# Patient Record
Sex: MALE | Race: WHITE | NOT HISPANIC OR LATINO | Employment: OTHER | URBAN - METROPOLITAN AREA
[De-identification: names, ages, dates, MRNs, and addresses within clinical notes are randomized per-mention and may not be internally consistent; named-entity substitution may affect disease eponyms.]

---

## 2017-02-27 ENCOUNTER — ALLSCRIPTS OFFICE VISIT (OUTPATIENT)
Dept: OTHER | Facility: OTHER | Age: 62
End: 2017-02-27

## 2017-08-08 ENCOUNTER — GENERIC CONVERSION - ENCOUNTER (OUTPATIENT)
Dept: OTHER | Facility: OTHER | Age: 62
End: 2017-08-08

## 2017-10-27 ENCOUNTER — ALLSCRIPTS OFFICE VISIT (OUTPATIENT)
Dept: OTHER | Facility: OTHER | Age: 62
End: 2017-10-27

## 2018-01-08 RX ORDER — SODIUM CHLORIDE, SODIUM LACTATE, POTASSIUM CHLORIDE, CALCIUM CHLORIDE 600; 310; 30; 20 MG/100ML; MG/100ML; MG/100ML; MG/100ML
125 INJECTION, SOLUTION INTRAVENOUS CONTINUOUS
Status: CANCELLED | OUTPATIENT
Start: 2018-01-08

## 2018-01-08 NOTE — H&P
Assessment  1  Basal cell carcinoma of face (173 31) (C44 310)     Discussion/Summary  Discussion Summary:   Abner Lopez is a pleasant 58year old male who presents for consultation regarding a BCC of the right cheek  Please see HPI  I discussed with him excision of the lesion with complex closure versus flap versus full-thickness skin graft  He understood and agreed  We discussed with the patient the options, benefits, and risks of surgery such as anesthesia, bleeding, infection, scarring and the need for additional procedures  Consent was obtained and all questions answered to his satisfaction  We will plan for surgery at his earliest convenience  Chief Complaint  Chief Complaint Free Text Note Form: Right cheek basal cell cancer  History of Present Illness  HPI: Abner Lopez is a pleasant 58year old male who presents for consultation regarding a BCC of the right cheek  He was referred by Dr Willian Wilkerson  He states the lesion had been there for a year  It was scaled and he noticed it with wearing his glasses  He also had an atypical lesion on his abdomen which he states was removed by Dr Willian Wilkerson  Review of Systems  Complete-Male:   Constitutional: no fever,-- not feeling poorly,-- no chills-- and-- not feeling tired  Eyes: eyesight problems-- and-- Wears eye glasses  ENT: no hearing loss  Cardiovascular: no chest pain  Respiratory: no shortness of breath  Gastrointestinal: no abdominal pain,-- no nausea,-- no vomiting,-- no constipation,-- no diarrhea-- and-- no blood in stools  Integumentary: as noted in HPI  Psychiatric: no anxiety-- and-- no depression  Hematologic/Lymphatic: no tendency for easy bleeding-- and-- no tendency for easy bruising  ROS Reviewed:   ROS reviewed  Active Problems  1  HTN (hypertension) (401 9) (I10)   2  Hypercholesterolemia (272 0) (E78 00)   3  Numerous moles (216 9) (D22 9)   4  Rash (782 1) (R21)   5   Seborrheic dermatitis (690 10) (L21 9)     Past Medical History  1  History of Acute bacterial conjunctivitis of both eyes (372 03) (H10 33)   2  History of Contact blepharoconjunctivitis of both eyes (372 22) (H10 533)   3  History of Cough (786 2) (R05)   4  History of Erectile dysfunction of non-organic origin (302 72) (F52 21)   5  History of Hip pain, unspecified laterality   6  History of acute conjunctivitis (V12 49) (Z86 69)   7  History of allergic rhinitis (V12 69) (Z87 09)   8  History of allergy (V15 09) (Z88 9)   9  History of bronchitis (V12 69) (Z87 09)   10  History of conjunctivitis (V12 49) (Z86 69)   11  History of rosacea (V13 3) (Z87 2)   12  History of traveler's diarrhea (V12 79) (Z86 19)   13  History of Left eye pain (379 91) (H57 12)   14  History of Need for prophylactic vaccination and inoculation against influenza (V04 81)    (Z23)   15  History of Pain in joint of left hip (719 45) (M25 552)   16  History of Pain of finger, unspecified laterality (729 5) (M79 646)   17  History of Screening for colon cancer (V76 51) (Z12 11)   18  History of Screening PSA (prostate specific antigen) (V76 44) (Z12 5)   19  History of Trochanteric bursitis, unspecified laterality (726 5) (M70 60)  Active Problems And Past Medical History Reviewed: The active problems and past medical history were reviewed and updated today  Surgical History  1  Denied: History Of Prior Surgery  Surgical History Reviewed: The surgical history was reviewed and updated today  Family History  Mother    1  Family history of skin cancer (V16 8) (Z80 8)  Father    2  No pertinent family history  Family History Reviewed: The family history was reviewed and updated today  Social History   · Being A Social Drinker   · Former smoker (F43 27) (V57 402)   · Never Used Drugs  Social History Reviewed: The social history was reviewed and updated today  Current Meds   1  Atorvastatin Calcium 20 MG Oral Tablet; TAKE 1 TABLET DAILY;    Therapy: 08IJE1802 to (Tamera Hussein)  Requested for: 26Sfj4765; Last   Rx:96Vkh3648 Ordered   2  PriLOSEC 40 MG CPDR; Therapy: (GVDEYDWW:89GVF8041) to Recorded  Medication List Reviewed: The medication list was reviewed and updated today  Allergies  1  Neomycin-Polymyxin-Dexameth SUSP  2  No Known Food Allergies   3  Seasonal     Vitals  Vital Signs     Recorded: 74SZG2156 09:39AM   BP Comments unobtainable   Height 5 ft 11 3 in   Weight 216 lb 6 oz   BMI Calculated 29 93   BSA Calculated 2 19      Physical Exam  General Complete Exam (Brief) Male:   Constitutional   General appearance: No acute distress, well appearing and well nourished  Eyes   Conjunctiva and lids: No swelling, erythema, or discharge  Pupils and irises: Equal, round and reactive to light  Ears, Nose, Mouth, and Throat   External inspection of ears and nose: Normal     Pulmonary   Respiratory effort: No increased work of breathing or signs of respiratory distress  Auscultation of lungs: Clear to auscultation, equal breath sounds bilaterally, no wheezes, no rales, no rhonci  Cardiovascular   Palpation of heart: Normal PMI, no thrills  Auscultation of heart: Normal rate and rhythm, normal S1 and S2, without murmurs  Abdomen   Abdomen: Non-tender, no masses  Lymphatic   Palpation of lymph nodes in neck: No lymphadenopathy  Musculoskeletal   Gait and station: Normal     Skin   Skin and subcutaneous tissue: Abnormal  -- Lesion right cheek between the eye and nasal bridge  It measured approximately 6mm and has irregular border and is scaled in appearance  Photos were taken  Neurologic   Cranial nerves: Cranial nerves 2-12 intact      Psychiatric   Orientation to person, place and time: Normal     Mood and affect: Normal

## 2018-01-09 ENCOUNTER — HOSPITAL ENCOUNTER (OUTPATIENT)
Facility: AMBULARY SURGERY CENTER | Age: 63
Setting detail: OUTPATIENT SURGERY
Discharge: HOME | End: 2018-01-09
Attending: SURGERY | Admitting: SURGERY
Payer: COMMERCIAL

## 2018-01-09 VITALS
WEIGHT: 212 LBS | HEART RATE: 102 BPM | DIASTOLIC BLOOD PRESSURE: 68 MMHG | OXYGEN SATURATION: 96 % | SYSTOLIC BLOOD PRESSURE: 150 MMHG | HEIGHT: 72 IN | BODY MASS INDEX: 28.71 KG/M2 | RESPIRATION RATE: 16 BRPM | TEMPERATURE: 98 F

## 2018-01-09 DIAGNOSIS — C44.310 BASAL CELL CARCINOMA OF SKIN OF FACE: ICD-10-CM

## 2018-01-09 PROCEDURE — 88305 TISSUE EXAM BY PATHOLOGIST: CPT | Performed by: SURGERY

## 2018-01-09 RX ORDER — LIDOCAINE HYDROCHLORIDE AND EPINEPHRINE 10; 10 MG/ML; UG/ML
INJECTION, SOLUTION INFILTRATION; PERINEURAL AS NEEDED
Status: DISCONTINUED | OUTPATIENT
Start: 2018-01-09 | End: 2018-01-09 | Stop reason: HOSPADM

## 2018-01-09 RX ORDER — OMEPRAZOLE 40 MG/1
40 CAPSULE, DELAYED RELEASE ORAL DAILY
COMMUNITY

## 2018-01-09 RX ORDER — ATORVASTATIN CALCIUM 20 MG/1
TABLET, FILM COATED ORAL
COMMUNITY
Start: 2013-11-12 | End: 2018-03-30 | Stop reason: SDUPTHER

## 2018-01-09 NOTE — INTERIM OP NOTE
CHEEK BCC EXCISION, COMPLEX CLOSURE VERSUS FLAP VERSUS FULL THICKNESS SKIN GRAFT  Postoperative Note  PATIENT NAME: Chaitanya Ambrose  : 1955  MRN: [id number]  [facility] OR [facility] 05    Surgery Date: 2018    Preop Diagnosis:  Basal cell carcinoma of skin of face [C44 310]    Post-Op Diagnosis Codes:      * Basal cell carcinoma of skin of face [C44 310]    Procedure(s) (LRB):  CHEEK BCC EXCISION (Right)  COMPLEX CLOSURE VERSUS FLAP VERSUS FULL THICKNESS SKIN GRAFT (Right)    Surgeon(s) and Role:     * Dorian Najera MD - Primary     * Melvin Ferreira PA-C - Assisting    Specimens:  ID Type Source Tests Collected by Time Destination   1 : RIGHT CHEEK/LOWER EYELID BASAL CELL CARCINOMA Tissue Skin, Other TISSUE Kavya Najera MD 2018 1346        Estimated Blood Loss:   Minimal    Anesthesia Type:   Local     Findings:    None  Complications:   None    SIGNATURE: Melvin Ferreira PA-C   DATE: 2018   TIME: 1:55 PM

## 2018-01-09 NOTE — DISCHARGE INSTRUCTIONS
[facility]  Dr Mariza Landin   [address] [facility], [address]  Phone: [phone]     Postoperative Instructions for Outpatient Surgery     These instructions are being provided by your doctor to give you basic guidelines during your post-op recovery  Please let our office know if your contact information has changed       Please call the office today for an appointment in about 5-7 days for suture removal      Dressings: Steri strip, replace if it falls off       Activity Restrictions: Nothing strenuous for 24 hours       Bathing:  May shower tomorrow and pat incision dry       Medications:    Resume pre-op medications     You may take tylenol, aleve, or ibuprofen for pain control

## 2018-01-09 NOTE — OP NOTE
OPERATIVE REPORT  PATIENT NAME: Mely Campbell    :  1955  MRN: [id number]  Pt Location: [facility] OR [facility]    SURGERY DATE: 2018    Surgeon(s) and Role:     * Lorenza Hood MD - Primary     * Heidi Mace PA-C - Assisting    Preop Diagnosis:  Basal cell carcinoma of skin of face [C44 310]    Post-Op Diagnosis Codes: * Basal cell carcinoma of skin of face [C44 310]     Procedure:  1  Excision basal cell carcinoma right cheek/lower eyelid 1 1 cm excised diameter  2  Complex closure right cheek/lower eyelid 1 7 cm  Specimen(s):  ID Type Source Tests Collected by Time Destination   1 : RIGHT CHEEK/LOWER EYELID BASAL CELL CARCINOMA Tissue Skin, Other TISSUE EXAM Lorenza Hood MD 2018 1346        Estimated Blood Loss:   Minimal    Drains:       Anesthesia Type:   Local    Operative Indications:  Basal cell carcinoma of skin of face [C44 310]      Operative Findings:  As above    Complications:   None    Procedure and Technique:  Patient was seen in the holding area and the surgical site was marked with his participation  We reviewed the planned procedure as well as potential risks, complications limitations  He was taken to the operating room in the surgical site was prepped and draped in sterile fashion  A proper time-out was performed  2 5 loupe magnification was used aid visualization  The area was marked for excision as an ellipse, it was marked to include a narrow margin of normal-appearing skin  After infiltrating the area with xylocaine with epinephrine the skin incision was created with a 15 blade, carried down through the dermis, and the lesion was excised at the level of the orbicularis muscle utilizing curved iris scissors  It was marked with suture at in sent to pathology  In order to close the wound without tension, the edges were undermined with a 15 blade  After wide circumferential undermining hemostasis was assured the Bovie cautery    Closure was then accomplished utilizing 5 O Vicryl sutures buried at the level of the deep dermis this was followed by multiple interrupted 6 0 nylon skin sutures  Benzoin and Steri-Strips were applied the patient was discharged in stable condition     I was present for the entire procedure    Patient Disposition:  PACU     SIGNATURE: Nargis Rhodes MD  DATE: January 9, 2018  TIME: 2:05 PM

## 2018-01-12 NOTE — PROGRESS NOTES
Assessment    1  Conjunctivitis (372 30) (H10 9)    Plan  Conjunctivitis    · PredniSONE 20 MG Oral Tablet; TAKE 1 TABLET DAILY WITH FOOD   · Tobramycin-Dexamethasone 0 3-0 1 % Ophthalmic Suspension; INSTILL 1 DROP INTO BOTH  EYES 4 TIMES DAILY   · Follow Up if Not Better Evaluation and Treatment  Follow-up  Status: Complete  Done: 33MEB3593  09:22AM    Discussion/Summary  Discussion Summary:   See med list    Counseling Documentation With Imm: The patient was counseled regarding instructions for management, risk factor reductions  Chief Complaint  Chief Complaint Free Text Note Form: pt is here for a 3 day f/u for conjunctivitis  pt c/o itching and watering in OU  He stated OU are crusted shut in the morning when he wakes up  History of Present Illness  Conjunctivitis (Brief): The patient is being seen for worsening symptoms of conjunctivitis  Symptoms:  eye discharge, lid crusting, eye irritation, eye itching and eye redness, but no blurred vision  No associated symptoms are reported  Review of Systems  Complete-Male:   Constitutional: No fever or chills, feels well, no tiredness, no recent weight gain or weight loss  Eyes: as noted in HPI    ENT: no complaints of earache, no hearing loss, no nosebleeds, no nasal discharge, no sore throat, no hoarseness  Cardiovascular: No complaints of slow heart rate, no fast heart rate, no chest pain, no palpitations, no leg claudication, no lower extremity  Respiratory: No complaints of shortness of breath, no wheezing, no cough, no SOB on exertion, no orthopnea or PND  Gastrointestinal: No complaints of abdominal pain, no constipation, no nausea or vomiting, no diarrhea or bloody stools  Active Problems    1  Conjunctivitis (372 30) (H10 9)   2  Contact blepharoconjunctivitis of both eyes (372 22) (H10 533)   3  Hypercholesterolemia (272 0) (E78 0)   4  Seborrheic dermatitis (690 10) (L21 9)    Past Medical History    1   History of Acute bacterial conjunctivitis of both eyes (372 03) (H10 023)   2  History of Cough (786 2) (R05)   3  History of Erectile dysfunction of non-organic origin (302 72) (F52 21)   4  History of Hip pain, unspecified laterality   5  History of acute conjunctivitis (V12 49) (Z86 69)   6  History of allergic rhinitis (V12 69) (Z87 09)   7  History of allergy (V15 09) (Z88 9)   8  History of rosacea (V13 3) (Z87 2)   9  History of traveler's diarrhea (V12 79) (Z86 19)   10  History of Left eye pain (379 91) (H57 12)   11  History of Pain in joint of left hip (719 45) (M25 552)   12  History of Pain of finger, unspecified laterality (729 5) (M79 646)   13  History of Screening PSA (prostate specific antigen) (V76 44) (Z12 5)   14  History of Trochanteric bursitis, unspecified laterality (726 5) (M70 60)  Active Problems And Past Medical History Reviewed: The active problems and past medical history were reviewed and updated today  Surgical History    1  Denied: History Of Prior Surgery  Surgical History Reviewed: The surgical history was reviewed and updated today  Family History    1  Family history of skin cancer (V16 8) (Z80 8)    2  No pertinent family history  Family History Reviewed: The family history was reviewed and updated today  Social History    · Being A Social Drinker   · Current Some Day Smoker (305 1)   · Never Used Drugs  Social History Reviewed: The social history was reviewed and updated today  Current Meds   1  Atorvastatin Calcium 20 MG Oral Tablet; TAKE 1 TABLET DAILY; Therapy: 76ZSF9245 to ([phone])  Requested for: 78VNF9604; Last Rx:29Sep2015   Ordered   2  Ketoconazole 2 % External Shampoo; APPLY TO SCALP EVERY OTHER DAY FOR 5 MINUTES AND   RINSE; Therapy: 82WUF0581 to (Don Robbins)  Requested for: 24YZT8265; Last Rx:06Jan2016   Ordered   3   Neomycin-Polymyxin-Dexameth 0 1 % Ophthalmic Suspension; INSTILL 2 DROP 4 times daily BOTH   EYES;   Therapy: 88HCX4914 to (Last Rx:15Jan2016)  Requested for: 22JQK1383 Ordered  Medication List Reviewed: The medication list was reviewed and updated today  Allergies    1  No Known Drug Allergies    2  No Known Food Allergies   3  Seasonal    Vitals  Vital Signs [Data Includes: Current Encounter]    Recorded: [id number] 09:15AM Recorded: 61QJC1046 09:00AM   Temperature  98 3 F   Heart Rate  99   Systolic 775 219   Diastolic 90 880   Height  6 ft    Weight  214 lb 4 oz   BMI Calculated  29 06   BSA Calculated  2 19     Physical Exam    Constitutional   General appearance: No acute distress, well appearing and well nourished  Eyes   Conjunctiva and lids: Abnormal   Conjunctiva Findings: bilateral hyperemia, watery discharge bilaterally and purulent discharge bilaterally  Ears, Nose, Mouth, and Throat   External inspection of ears and nose: Normal     Oropharynx: Normal with no erythema, edema, exudate or lesions  Pulmonary   Auscultation of lungs: Clear to auscultation, equal breath sounds bilaterally, no wheezes, no rales, no rhonci  Cardiovascular   Auscultation of heart: Normal rate and rhythm, normal S1 and S2, without murmurs  Carotid pulses: Normal     Abdomen   Abdomen: Non-tender, no masses  Liver and spleen: No hepatomegaly or splenomegaly  Neurologic   Cranial nerves: Cranial nerves 2-12 intact  Future Appointments    Date/Time Provider Specialty Site   01/26/2016 04:45 PM Svetlana Sewell MD Internal Medicine [facility] PRIMARY CARE     Signatures   Electronically signed by :  Tien Decker MD; Jan 18 2016  9:23AM EST                       (Author)

## 2018-01-14 VITALS
OXYGEN SATURATION: 98 % | DIASTOLIC BLOOD PRESSURE: 90 MMHG | RESPIRATION RATE: 18 BRPM | TEMPERATURE: 98.5 F | HEART RATE: 96 BPM | WEIGHT: 211.8 LBS | BODY MASS INDEX: 29.65 KG/M2 | SYSTOLIC BLOOD PRESSURE: 128 MMHG | HEIGHT: 71 IN

## 2018-01-17 ENCOUNTER — ALLSCRIPTS OFFICE VISIT (OUTPATIENT)
Dept: OTHER | Facility: OTHER | Age: 63
End: 2018-01-17

## 2018-01-17 NOTE — CONSULTS
I had the pleasure of evaluating your patient, Andrew Rock  My full evaluation follows:      History of Present Illness  Triston Leblanc is a pleasant 58year old male who presents for consultation regarding a BCC of the right cheek  He was referred by Dr Alex Navarrete  He states the lesion had been there for a year  It was scaled and he noticed it with wearing his glasses  He also had an atypical lesion on his abdomen which he states was removed by Dr Aelx Navarrete  Discussion/Summary    Triston Leblanc is a pleasant 58year old male who presents for consultation regarding a BCC of the right cheek  Please see HPI  I discussed with him excision of the lesion with complex closure versus flap versus full-thickness skin graft  He understood and agreed  We discussed with the patient the options, benefits, and risks of surgery such as anesthesia, bleeding, infection, scarring and the need for additional procedures  Consent was obtained and all questions answered to his satisfaction  We will plan for surgery at his earliest convenience  Thank you very much for allowing me to participate in the care of this patient  If you have any questions, please do not hesitate to contact me        Signatures   Electronically signed by : Irena Ridley, [facility]; Oct 27 2017 10:20AM EST                       (Author)    Electronically signed by : ROB Melendez ; Oct 31 2017  9:21AM EST

## 2018-01-17 NOTE — PROGRESS NOTES
Assessment    1  Contact blepharoconjunctivitis of both eyes (372 22) (H10 663)    Plan  Conjunctivitis    · Tobramycin 0 3 % Ophthalmic Solution  Contact blepharoconjunctivitis of both eyes    · Neomycin-Polymyxin-Dexameth 0 1 % Ophthalmic Suspension; INSTILL 2 DROP 4  times daily BOTH EYES    Discussion/Summary    We will discontinue the tobramycin  Difficult to determine whether this is a reaction to the tobramycin or if it is just a viral blepharoconjunctivitis  The patient states he had a similar episode a year ago and was treated with TobraDex  We will prescribe neomycin polymyxin and dexamethasone ophthalmic suspension and monitor for improvement  The patient was cautioned that should his eyes not improve or if they should he worsen he should discontinue the medication and use only over-the-counter saline drops until he can be reevaluated or possibly seen by ophthalmology  Chief Complaint    1  Eye Discharge   2  Eye Itching   3  Eye Pain  Patient is here c/o burning, itch on both eyes, dry skin around the eyes for two weeks, symptoms are getting worse  History of Present Illness  HPI: Patient presents to the office with hyperemic conjunctiva  He was initially seen and treated for a suspected contact blepharoconjunctivitis with tobramycin ophthalmic suspension  He has been using it for a week and his eyes have actually gotten worse  He denies any morning crusting of his eyes there is no purulent drainage  Excess tearing is noted along with a sensation of burning  Review of Systems    Constitutional: no fever or chills, feels well, no tiredness, no recent weight loss or weight gain  ENT: no complaints of earache, no loss of hearing, no nosebleeds or nasal discharge, no sore throat or hoarseness  Cardiovascular: no complaints of slow or fast heart rate, no chest pain, no palpitations, no leg claudication or lower extremity edema     Respiratory: no complaints of shortness of breath, no wheezing or cough, no dyspnea on exertion, no orthopnea or PND  Active Problems    1  Conjunctivitis (372 30) (H10 9)   2  Contact blepharoconjunctivitis of both eyes (372 22) (H10 533)   3  Hypercholesterolemia (272 0) (E78 0)   4  Seborrheic dermatitis (690 10) (L21 9)    Past Medical History  Active Problems And Past Medical History Reviewed: The active problems and past medical history were reviewed and updated today  Social History    · Being A Social Drinker   · Current Some Day Smoker (305 1)   · Never Used Drugs  The social history was reviewed and is unchanged  Current Meds   1  Atorvastatin Calcium 20 MG Oral Tablet; TAKE 1 TABLET DAILY; Therapy: 71DQF4903 to (797 9386)  Requested for: 81YCM1465; Last   Rx:64Ioy3365 Ordered   2  Ketoconazole 2 % External Shampoo; APPLY TO SCALP EVERY OTHER DAY FOR 5   MINUTES AND RINSE; Therapy: 98LUI2999 to (Edwinna Rash)  Requested for: 11PKJ2216; Last   Rx:06Jan2016 Ordered   3  Tobramycin 0 3 % Ophthalmic Solution; INSTILL 1 DROP INTO AFFECTED EYE(S) 4   TIMES DAILY; Therapy: 28GRO0602 to (Evaluate:26Jan2016)  Requested for: 95JDS7913; Last   Rx:06Jan2016 Ordered    The medication list was reviewed and updated today  Allergies    1  No Known Drug Allergies    2  No Known Food Allergies   3  Seasonal    Vitals   Recorded: 84BDL6018 04:25PM   Temperature 98 5 F, Oral   Heart Rate 93   Systolic 610, LUE, Sitting   Diastolic 88, LUE, Sitting   Height 6 ft    Weight 211 lb 6 oz   BMI Calculated 28 67   BSA Calculated 2 18   O2 Saturation 98     Physical Exam    Constitutional   General appearance: No acute distress, well appearing and well nourished  Eyes   Conjunctiva and lids: Abnormal   Conjunctiva Findings: bilateral hyperemia and watery discharge bilaterally, but no purulent discharge and no subconjunctival hemorrhages   Eye Lids: bilateral upper eyelid blepharitis and bilateral lower eyelid blepharitis, but normal bilaterally, no ptosis on the right, no lid lag on the right, right lacrimal gland not enlarged, no purulent discharge from the right lacrimal gland, no ptosis on the left, no lid lag on the left, left lacrimal gland not enlarged and no purulent discharge from the left lacrimal gland          Future Appointments    Date/Time Provider Specialty Site   01/26/2016 04:45 PM Wes Powell MD Internal Medicine [facility] PRIMARY CARE     Signatures   Electronically signed by : Farzad Maya MD; Lowell 15 2016  4:56PM EST                       (Author)

## 2018-01-17 NOTE — PROGRESS NOTES
Assessment    1  HTN (hypertension) (401 9) (I10)   2  Hypercholesterolemia (272 0) (E78 0)   3  Need for prophylactic vaccination and inoculation against influenza (V04 81) (Z23)   4  Seborrheic dermatitis (690 10) (L21 9)   5  Numerous moles (216 9) (D22 9)    Plan  Hypercholesterolemia    · Atorvastatin Calcium 20 MG Oral Tablet (Lipitor); TAKE 1 TABLET DAILY  Need for prophylactic vaccination and inoculation against influenza    · Stop: Influenza  Numerous moles, Seborrheic dermatitis    · 2 - Maria L GERMAN, Tera Schmitz  (Dermatology) Physician Referral  Consult  Status: Hold For - Scheduling   Requested for: 24ZET4250  Care Summary provided  : Yes    Discussion/Summary  Counseling Documentation With Imm: The patient was counseled regarding prognosis, impressions  Chief Complaint  Chief Complaint Free Text Note Form: pt is here for cholesterol f/u  review labs  pt says the ketoconazole shampoo is not helping      History of Present Illness  Hyperlipidemia (Follow-Up): The patient states his hyperlipidemia has been poorly controlled since the last visit  Comorbid Illnesses: hypertension  He has no significant interval events  Symptoms: denies chest pain, denies intermittent leg claudication, denies muscle pain and denies muscle weakness  Associated symptoms include no focal neurologic deficits and no memory loss  Medications: The patient is not currently on any medications for his hyperlipidemia  The patient is not doing well with his hyperlipidemia goals  Review of Systems  Complete-Male:   Constitutional: No fever or chills, feels well, no tiredness, no recent weight gain or weight loss  Eyes: No complaints of eye pain, no red eyes, no discharge from eyes, no itchy eyes  ENT: nasal discharge and watering of nose, but no complaints of earache, no hearing loss, no nosebleeds, no nasal discharge, no sore throat, no hoarseness     Cardiovascular: No complaints of slow heart rate, no fast heart rate, no chest pain, no palpitations, no leg claudication, no lower extremity  Respiratory: No complaints of shortness of breath, no wheezing, no cough, no SOB on exertion, no orthopnea or PND  Gastrointestinal: No complaints of abdominal pain, no constipation, no nausea or vomiting, no diarrhea or bloody stools  Musculoskeletal: hip and finger pain  Active Problems    1  Conjunctivitis (372 30) (H10 9)   2  Contact blepharoconjunctivitis of both eyes (372 22) (H10 533)   3  Hypercholesterolemia (272 0) (E78 0)   4  Screening for colon cancer (V76 51) (Z12 11)   5  Seborrheic dermatitis (690 10) (L21 9)    Past Medical History    1  History of Acute bacterial conjunctivitis of both eyes (372 03) (H10 023)   2  History of Cough (786 2) (R05)   3  History of Erectile dysfunction of non-organic origin (302 72) (F52 21)   4  History of Hip pain, unspecified laterality   5  History of acute conjunctivitis (V12 49) (Z86 69)   6  History of allergic rhinitis (V12 69) (Z87 09)   7  History of allergy (V15 09) (Z88 9)   8  History of rosacea (V13 3) (Z87 2)   9  History of traveler's diarrhea (V12 79) (Z86 19)   10  History of Left eye pain (379 91) (H57 12)   11  History of Pain in joint of left hip (719 45) (M25 552)   12  History of Pain of finger, unspecified laterality (729 5) (M79 646)   13  History of Screening PSA (prostate specific antigen) (V76 44) (Z12 5)   14  History of Trochanteric bursitis, unspecified laterality (726 5) (M70 60)    Surgical History    1  Denied: History Of Prior Surgery    Family History    1  Family history of skin cancer (V16 8) (Z80 8)    2  No pertinent family history    Social History    · Being A Social Drinker   · Former smoker (C39 45) (M46 508)   · Never Used Drugs    Current Meds   1  Atorvastatin Calcium 20 MG Oral Tablet; TAKE 1 TABLET DAILY; Therapy: 96HBV0899 to ()  Requested for: 82KHO7314; Last Rx:00Gvt8033   Ordered   2   Ketoconazole 2 % External Shampoo; APPLY TO SCALP EVERY OTHER DAY FOR 5 MINUTES AND   RINSE; Therapy: 99ROG7694 to (Shae Ríos)  Requested for: 00VYX5791; Last Rx:06Jan2016   Ordered   3  Tobramycin-Dexamethasone 0 3-0 1 % Ophthalmic Suspension; INSTILL 1 DROP INTO BOTH EYES 4   TIMES DAILY; Therapy: 22JJX7927 to (Last Rx:18Jan2016)  Requested for: 70RVX1439 Ordered    Allergies    1  No Known Drug Allergies    2  No Known Food Allergies   3  Seasonal    Vitals  Vital Signs [Data Includes: Current Encounter]    Recorded: 85TPD2424 08:10AM   Temperature 98 F, Oral   Heart Rate 76   Systolic 640, LUE, Sitting   Diastolic 84, LUE, Sitting   BP Cuff Size Large   Height 6 ft    Weight 206 lb    BMI Calculated 27 94   BSA Calculated 2 16   O2 Saturation 99, RA     Physical Exam    Constitutional   General appearance: No acute distress, well appearing and well nourished  Eyes   Conjunctiva and lids: No swelling, erythema, or discharge  Ears, Nose, Mouth, and Throat   External inspection of ears and nose: Normal     Pulmonary   Auscultation of lungs: Clear to auscultation, equal breath sounds bilaterally, no wheezes, no rales, no rhonci  Cardiovascular   Auscultation of heart: Normal rate and rhythm, normal S1 and S2, without murmurs  Examination of extremities for edema and/or varicosities: Normal     Carotid pulses: Normal     Abdomen   Abdomen: Non-tender, no masses  Liver and spleen: No hepatomegaly or splenomegaly  Neurologic   Cranial nerves: Cranial nerves 2-12 intact  Reflexes: 2+ and symmetric  Sensation: No sensory loss           Signatures   Electronically signed by : Vikas Jackson MD; Feb 5 2016  8:52AM EST                       (Author)

## 2018-01-22 VITALS — HEIGHT: 71 IN | BODY MASS INDEX: 30.29 KG/M2 | WEIGHT: 216.38 LBS

## 2018-01-23 NOTE — PROGRESS NOTES
Active Problems    1  Basal cell carcinoma of face (173 31) (C44 310)   2  HTN (hypertension) (401 9) (I10)   3  Hypercholesterolemia (272 0) (E78 00)   4  Numerous moles (216 9) (D22 9)   5  Rash (782 1) (R21)   6  Seborrheic dermatitis (690 10) (L21 9)    Current Meds   1  Atorvastatin Calcium 20 MG Oral Tablet; TAKE 1 TABLET DAILY; Therapy: 83DOO2427 to (Evaluate:22Feb2018)  Requested for: 27Feb2017; Last   Rx:27Feb2017 Ordered   2  PriLOSEC 40 MG CPDR; Therapy: (Recorded:27Oct2017) to Recorded    Allergies    1  Neomycin-Polymyxin-Dexameth SUSP    2  No Known Food Allergies   3  Seasonal    Procedure  Procedure: suture removal  The wound was located on the right cheek and right lower eyelid  Wound Exam: well healed with no sign of infection  Procedure Note: sutures were removed  Dressing: an antibiotic ointment was applied  Patient Status:  the patient tolerated the procedure well  Complications:  there were no complications  Discussion/Summary      Patient presents for suture removal s/p BCC excision right low eyelid/cheek 1/9/18  Incision well healed  Post op scar care instructions reviewed and given with instructions to begin in 2 weeks  Pathology results reviewed with Dr Mae Carrillo and with patient  Patient verbalized understanding that margins were negative with two areas being close to edges of excision requiring no further tx  Patient will follow up in 4-6 weeks with Dr Mae Carrillo or PA  Final path report and op notes faxed to Dr Indio Singh at [phone]          Signatures   Electronically signed by : Guillermina Frank RN; Jan 17 2018 10:25AM EST                       (Author)    Electronically signed by : ROB Souza ; Jan 17 2018  5:18PM EST

## 2018-02-02 RX ORDER — AZITHROMYCIN 250 MG/1
250 TABLET, FILM COATED ORAL EVERY 24 HOURS
Qty: 6 TABLET | Refills: 0 | Status: CANCELLED | OUTPATIENT
Start: 2018-02-02 | End: 2018-02-07

## 2018-02-12 ENCOUNTER — TELEPHONE (OUTPATIENT)
Dept: FAMILY MEDICINE CLINIC | Facility: CLINIC | Age: 63
End: 2018-02-12

## 2018-02-16 DIAGNOSIS — F52.21 ED (ERECTILE DYSFUNCTION) OF NON-ORGANIC ORIGIN: Primary | ICD-10-CM

## 2018-02-16 RX ORDER — VARDENAFIL HYDROCHLORIDE 20 MG/1
20 TABLET ORAL DAILY PRN
Qty: 10 TABLET | Refills: 5 | Status: SHIPPED | OUTPATIENT
Start: 2018-02-16 | End: 2021-11-11

## 2018-03-09 ENCOUNTER — OFFICE VISIT (OUTPATIENT)
Dept: PLASTIC SURGERY | Facility: CLINIC | Age: 63
End: 2018-03-09
Payer: COMMERCIAL

## 2018-03-09 VITALS — HEIGHT: 72 IN | BODY MASS INDEX: 27.5 KG/M2 | WEIGHT: 203 LBS

## 2018-03-09 DIAGNOSIS — C44.319 BASAL CELL CARCINOMA OF CHEEK: Primary | ICD-10-CM

## 2018-03-09 PROCEDURE — 99212 OFFICE O/P EST SF 10 MIN: CPT | Performed by: PHYSICIAN ASSISTANT

## 2018-03-09 NOTE — LETTER
March 9, 2018     Osmanlisa Tejeda, [address] [facility]    Patient: Nereyda Vela   YOB: 1955   Date of Visit: 3/9/2018       Dear Dr Ezio Borrego:    Thank you for referring Nereyda Vela to me for evaluation  Below are my notes for this consultation  If you have questions, please do not hesitate to call me  I look forward to following your patient along with you           Sincerely,        Joel Arreguin PA-C        CC: Irma Márquez MD

## 2018-03-09 NOTE — PROGRESS NOTES
Assessment/Plan:   Emi Gunn is a pleasant 63-year-old male who is 2 months status post excision of a right cheek basal cell carcinoma with complex closure  Please see HPI  I have instructed him to become more aggressive with the silicone scar gel and massage to the scar  We will see him back in 3 months to re-evaluate the scar  Diagnoses and all orders for this visit:    Basal cell carcinoma of cheek          Subjective:     Patient ID: Gomez Raygoza is a 61 y o  male  HPI   Emi Gunn is a pleasant 63-year-old male who is 2 months status post excision of a right cheek basal cell carcinoma with complex closure  He is very happy with his results  He denies any complaints regarding the scar  Review of Systems   Skin:        As per HPI  Objective:     Physical Exam   Skin:   Right cheek/medial lower eyelid scar feels firm  Otherwise well healed  See photos

## 2018-03-26 RX ORDER — ATORVASTATIN CALCIUM 20 MG/1
TABLET, FILM COATED ORAL
Qty: 90 TABLET | Refills: 3 | OUTPATIENT
Start: 2018-03-26

## 2018-03-30 DIAGNOSIS — E78.2 MIXED HYPERLIPIDEMIA: Primary | ICD-10-CM

## 2018-03-30 RX ORDER — ATORVASTATIN CALCIUM 20 MG/1
20 TABLET, FILM COATED ORAL DAILY
Qty: 90 TABLET | Refills: 1 | Status: SHIPPED | OUTPATIENT
Start: 2018-03-30 | End: 2018-11-14 | Stop reason: SDUPTHER

## 2018-11-14 DIAGNOSIS — E78.2 MIXED HYPERLIPIDEMIA: ICD-10-CM

## 2018-11-14 RX ORDER — ATORVASTATIN CALCIUM 20 MG/1
20 TABLET, FILM COATED ORAL DAILY
Qty: 90 TABLET | Refills: 1 | Status: SHIPPED | OUTPATIENT
Start: 2018-11-14 | End: 2019-06-17 | Stop reason: SDUPTHER

## 2019-01-25 ENCOUNTER — OFFICE VISIT (OUTPATIENT)
Dept: URGENT CARE | Facility: CLINIC | Age: 64
End: 2019-01-25
Payer: COMMERCIAL

## 2019-01-25 VITALS
HEIGHT: 71 IN | DIASTOLIC BLOOD PRESSURE: 100 MMHG | WEIGHT: 209 LBS | BODY MASS INDEX: 29.26 KG/M2 | OXYGEN SATURATION: 100 % | RESPIRATION RATE: 16 BRPM | HEART RATE: 71 BPM | TEMPERATURE: 96.5 F | SYSTOLIC BLOOD PRESSURE: 140 MMHG

## 2019-01-25 DIAGNOSIS — S61.012A LACERATION OF LEFT THUMB WITHOUT FOREIGN BODY WITHOUT DAMAGE TO NAIL, INITIAL ENCOUNTER: Primary | ICD-10-CM

## 2019-01-25 PROBLEM — C44.310 BASAL CELL CARCINOMA OF FACE: Status: ACTIVE | Noted: 2017-10-27

## 2019-01-25 PROBLEM — R21 RASH: Status: ACTIVE | Noted: 2017-02-27

## 2019-01-25 PROCEDURE — 90471 IMMUNIZATION ADMIN: CPT

## 2019-01-25 PROCEDURE — 90715 TDAP VACCINE 7 YRS/> IM: CPT

## 2019-01-25 PROCEDURE — 12001 RPR S/N/AX/GEN/TRNK 2.5CM/<: CPT | Performed by: PHYSICIAN ASSISTANT

## 2019-01-25 PROCEDURE — 99213 OFFICE O/P EST LOW 20 MIN: CPT | Performed by: PHYSICIAN ASSISTANT

## 2019-01-25 NOTE — PATIENT INSTRUCTIONS
Keep your skin clean using soap and water  Pat dry  Avoid completely submerging finger in water for 2-3 days  Monitor for signs of infection including increasing redness, swelling, pus formation or drainage, streaking redness, fevers, chills, and body aches  Seek care immediately if these symptoms occur  Keep the finger bandaged when you will be working with your hands or in a dirty environment  You may remove the bandage and leave open to the air when you will be sitting in a clean environment and not using your hands  Follow up with your family doctor in 3-5 days  Proceed to the ER if symptoms worsen  Acute Wound Care   AMBULATORY CARE:   An acute wound  is an injury that causes a break in the skin  An acute wound can happen suddenly, last a short time, and may heal on its own  Common signs and symptoms of an acute wound:   · A cut, tear, or gash in your skin    · Bleeding, swelling, pain, or trouble moving the affected area    · Dirt or foreign objects inside the wound     · Milky, yellow, green, or brown pus in the wound     · Red, tender, or warm area around the pus    · Fever  Seek care immediately if:   · You have pus or a foul odor coming from the wound  · You have sudden trouble breathing or chest pain  · Blood soaks through your bandage  Contact your healthcare provider if:   · You have muscle, joint, or body aches, sweating, or a fever  · You have more swelling, redness, or bleeding in your wound  · Your skin is itchy, swollen, or you have a rash  · You have questions or concerns about your condition or care  Treatment for an acute wound  may include any of the following:  · Cleansing  is done with soap and water to wash away germs and decrease the risk of infection  Sterile water further cleans the wound  The cleaning is done under high pressure with a catheter tip and large syringe  A solution that kills germs may also be used      · Debridement  is done to clean and remove objects, dirt, or dead tissues from the open wound  Healthcare providers may also drain the wound to clean out pus  · Closure of the wound  is done with stitches, staples, skin adhesive, or other treatments  This may be done if the wound is wide or deep  Stitches may be needed if the wound is in an area that moves a lot, such as the hands, feet, and joints  Stitches may help to keep the wound from getting infected  They may also decrease the amount of scarring you have  Some wounds may heal better without stitches  Wound care:   · If your wound was closed with thin strips of medical tape, keep them clean and dry  The strips of medical tape will fall off on their own  Do not pull them off  · Keep the bandage clean and dry  Do not remove the bandage over your wound unless your healthcare provider says it is okay  · Wash your hands before and after you take care of your wound to prevent infection  · Clean the wound as directed  If you cannot reach the wound, have someone help you  · If you have packing, make sure all the gauze used to pack the wound is taken out and replaced as directed  Keep track of how many gauze dressings are placed inside the wound  Follow up with your healthcare provider as directed:  Write down your questions so you remember to ask them during your visits  © 2016 [employer] is for End User's use only and may not be sold, redistributed or otherwise used for commercial purposes  All illustrations and images included in CareNotes® are the copyrighted property of A D A M , Inc  or Brayden Aviles  The above information is an  only  It is not intended as medical advice for individual conditions or treatments  Talk to your doctor, nurse or pharmacist before following any medical regimen to see if it is safe and effective for you

## 2019-01-25 NOTE — PROGRESS NOTES
[facility]        NAME: Anna Garcia is a 59 y o  male  : 1955    MRN: [id number]  DATE: 2019  TIME: 9:55 AM    Assessment and Plan   Laceration of left thumb without foreign body without damage to nail, initial encounter [S61 012A]  1  Laceration of left thumb without foreign body without damage to nail, initial encounter  TDAP Vaccine greater than or equal to 8yo    Tdap administered in office  Patient Instructions   Keep your skin clean using soap and water  Pat dry  Avoid completely submerging finger in water for 2-3 days  Monitor for signs of infection including increasing redness, swelling, pus formation or drainage, streaking redness, fevers, chills, and body aches  Seek care immediately if these symptoms occur  Keep the finger bandaged when you will be working with your hands or in a dirty environment  You may remove the bandage and leave open to the air when you will be sitting in a clean environment and not using your hands  Follow up with your family doctor in 3-5 days  Proceed to the ER if symptoms worsen  Treatment plan reviewed in length  All questions answered  Precautions given  Patient verbalized understanding and agreement  Chief Complaint     Chief Complaint   Patient presents with    Laceration     recieved injury from metal pretty last evening small laceration on interior portion of      History of Present Illness   28-year-old male presenting with complaint of laceration of the left thumb x 1 day  He notes that at roughly 7:00 p m  yesterday he cut his finger trying to assemble something noting he jammed a metal pretty into his thumb  He notes pain only in the location of the laceration denying any joint or hand pain  He did clean the digit using an antiseptic spray  No other treatments tried  He has kept the digit bandaged until this morning  He notes tingling in the area of the laceration but otherwise denies any numbness or weakness    No changes in skin color  Bleeding was easily stopped  FROM of the digit  Review of Systems   Review of Systems   Constitutional: Negative for chills, diaphoresis, fatigue and fever  Respiratory: Negative for cough, shortness of breath and wheezing  Cardiovascular: Negative for chest pain and palpitations  Gastrointestinal: Negative for abdominal pain, diarrhea, nausea and vomiting  Musculoskeletal: Negative for myalgias  Skin: Negative for rash  Neurological: Negative for weakness and numbness  Current Medications       Current Outpatient Prescriptions:     atorvastatin (LIPITOR) 20 mg tablet, Take 1 tablet (20 mg total) by mouth daily, Disp: 90 tablet, Rfl: 1    omeprazole (PRILOSEC) 40 MG capsule, Take by mouth, Disp: , Rfl:     vardenafil (LEVITRA) 20 MG tablet, Take 1 tablet (20 mg total) by mouth daily as needed for erectile dysfunction for up to 10 days, Disp: 10 tablet, Rfl: 5    Current Allergies     Allergies as of 01/25/2019 - Reviewed 01/25/2019   Allergen Reaction Noted    Neomycin-polymyxin-dexameth  07/08/2016          The following portions of the patient's history were reviewed and updated as appropriate: allergies, current medications, past family history, past medical history, past social history, past surgical history and problem list      Past Medical History:   Diagnosis Date    GERD (gastroesophageal reflux disease)     Hyperlipidemia        Past Surgical History:   Procedure Laterality Date    FACIAL/NECK BIOPSY Right 1/9/2018    Procedure: CHEEK and LOWER EYELID BCC EXCISION AND COMPLEX CLOSURE;  Surgeon: Monica Flores MD;  Location: [facility];  Service: Plastics       No family history on file  Medications have been verified        Objective   /100   Pulse 71   Temp (!) 96 5 °F (35 8 °C)   Resp 16   Ht 5' 11" (1 803 m)   Wt 94 8 kg (209 lb)   SpO2 100%   BMI 29 15 kg/m²      Physical Exam     Physical Exam   Constitutional: He is oriented to person, place, and time  He appears well-developed and well-nourished  He is cooperative  He does not appear ill  No distress  HENT:   Head: Normocephalic and atraumatic  Eyes: Conjunctivae are normal    Neck: Neck supple  Cardiovascular: Normal rate and regular rhythm  Exam reveals no gallop and no friction rub  No murmur heard  Pulmonary/Chest: Effort normal and breath sounds normal  No respiratory distress  He has no decreased breath sounds  He has no wheezes  He has no rhonchi  He has no rales  Neurological: He is alert and oriented to person, place, and time  He has normal reflexes  No cranial nerve deficit  He exhibits normal muscle tone  Coordination normal    Skin: Skin is warm and dry  Laceration (1 cm semicircular laceration of the left distal 1st digit  No visible foreign bodies  Healing has begun  Wound is no longer able to be approximated  Surrounding skin appears clean and dry  No signs of secondary infection ) noted  No rash noted  He is not diaphoretic  No pallor  Psychiatric: He has a normal mood and affect  His behavior is normal    Nursing note and vitals reviewed  Laceration repair  Date/Time: 1/25/2019 12:57 PM  Performed by: Clearance Boy  Authorized by: Clearance Boy   Consent: Verbal consent obtained  Consent given by: patient  Patient identity confirmed: verbally with patient  Body area: upper extremity  Location details: left thumb  Laceration length: 1 cm  Foreign bodies: no foreign bodies  Tendon involvement: none  Nerve involvement: none  Vascular damage: no  Anesthesia method: none  Sedation:  Patient sedated: no      Procedure Details:  Preparation: Wound cleansed using betadine swabstick  Suture technique: Dermabond applied  Approximation: loose  Patient tolerance: Patient tolerated the procedure well with no immediate complications  Comments: Bandage applied prior to d/c

## 2019-06-17 ENCOUNTER — OFFICE VISIT (OUTPATIENT)
Dept: INTERNAL MEDICINE CLINIC | Facility: CLINIC | Age: 64
End: 2019-06-17
Payer: COMMERCIAL

## 2019-06-17 VITALS
OXYGEN SATURATION: 98 % | HEIGHT: 73 IN | DIASTOLIC BLOOD PRESSURE: 100 MMHG | SYSTOLIC BLOOD PRESSURE: 154 MMHG | TEMPERATURE: 98 F | HEART RATE: 96 BPM | WEIGHT: 205.2 LBS | BODY MASS INDEX: 27.2 KG/M2

## 2019-06-17 DIAGNOSIS — E78.2 MIXED HYPERLIPIDEMIA: ICD-10-CM

## 2019-06-17 DIAGNOSIS — Z12.5 SCREENING FOR PROSTATE CANCER: ICD-10-CM

## 2019-06-17 DIAGNOSIS — Z23 NEED FOR PNEUMOCOCCAL VACCINATION: Primary | ICD-10-CM

## 2019-06-17 DIAGNOSIS — M54.2 NECK PAIN: ICD-10-CM

## 2019-06-17 DIAGNOSIS — E78.00 HYPERCHOLESTEROLEMIA: ICD-10-CM

## 2019-06-17 DIAGNOSIS — Z11.59 NEED FOR HEPATITIS C SCREENING TEST: ICD-10-CM

## 2019-06-17 DIAGNOSIS — I10 ESSENTIAL HYPERTENSION: ICD-10-CM

## 2019-06-17 PROBLEM — R21 RASH: Status: RESOLVED | Noted: 2017-02-27 | Resolved: 2019-06-17

## 2019-06-17 PROCEDURE — 1036F TOBACCO NON-USER: CPT | Performed by: INTERNAL MEDICINE

## 2019-06-17 PROCEDURE — 3008F BODY MASS INDEX DOCD: CPT | Performed by: INTERNAL MEDICINE

## 2019-06-17 PROCEDURE — 99214 OFFICE O/P EST MOD 30 MIN: CPT | Performed by: INTERNAL MEDICINE

## 2019-06-17 RX ORDER — ATORVASTATIN CALCIUM 20 MG/1
20 TABLET, FILM COATED ORAL DAILY
Qty: 90 TABLET | Refills: 1 | Status: SHIPPED | OUTPATIENT
Start: 2019-06-17 | End: 2019-12-02 | Stop reason: SDUPTHER

## 2019-06-17 RX ORDER — LOSARTAN POTASSIUM 100 MG/1
100 TABLET ORAL DAILY
Qty: 90 TABLET | Refills: 3 | Status: SHIPPED | OUTPATIENT
Start: 2019-06-17 | End: 2020-06-15

## 2019-09-20 ENCOUNTER — OFFICE VISIT (OUTPATIENT)
Dept: INTERNAL MEDICINE CLINIC | Facility: CLINIC | Age: 64
End: 2019-09-20
Payer: COMMERCIAL

## 2019-09-20 VITALS
HEIGHT: 73 IN | TEMPERATURE: 97.9 F | BODY MASS INDEX: 27.67 KG/M2 | DIASTOLIC BLOOD PRESSURE: 90 MMHG | SYSTOLIC BLOOD PRESSURE: 140 MMHG | WEIGHT: 208.8 LBS | HEART RATE: 100 BPM | OXYGEN SATURATION: 99 %

## 2019-09-20 DIAGNOSIS — I10 ESSENTIAL HYPERTENSION: Primary | ICD-10-CM

## 2019-09-20 DIAGNOSIS — E78.00 HYPERCHOLESTEROLEMIA: ICD-10-CM

## 2019-09-20 PROBLEM — C44.310 BASAL CELL CARCINOMA OF FACE: Status: RESOLVED | Noted: 2017-10-27 | Resolved: 2019-09-20

## 2019-09-20 PROBLEM — M54.2 NECK PAIN: Status: RESOLVED | Noted: 2019-06-17 | Resolved: 2019-09-20

## 2019-09-20 PROCEDURE — 3008F BODY MASS INDEX DOCD: CPT | Performed by: INTERNAL MEDICINE

## 2019-09-20 PROCEDURE — 99214 OFFICE O/P EST MOD 30 MIN: CPT | Performed by: INTERNAL MEDICINE

## 2019-09-20 NOTE — PROGRESS NOTES
Assessment/Plan:  BMI Counseling: Body mass index is 27 93 kg/m²  The BMI is above normal  Nutrition recommendations include reducing portion sizes, decreasing overall calorie intake, 3-5 servings of fruits/vegetables daily, reducing fast food intake, consuming healthier snacks and decreasing soda and/or juice intake  Exercise recommendations include exercising 3-5 times per week  HTN (hypertension)  Patient has a history of hypertension his hypertension is better controlled as compared to before he is on losartan 100 mg once a day I will continue with same dose of the medication because patient is under lot of stress because of the work I will see him back in about 4 months and see how his the numbers are also he did not get his blood workup to this time so he will get his complete blood workup about a week before his the next visit and hopefully we will adjust the dose or add something on his medication he denying any symptoms of hyper or hypotension    Hypercholesterolemia  No problems with the hypercholesteremia medications he is taking his medication regularly no side effects he will be seeing him back in about 4 months with the blood workup this time the blood workup was not done       Diagnoses and all orders for this visit:    Essential hypertension    Hypercholesterolemia        Subjective:   Patient is here for the regular follow-up no new complaints   Patient ID: Louisa Benedict is a 59 y o  male      HPI  This is a very pleasant 59 years young gentleman with no problems came for the regular follow-up history of hypertension and hypercholesterolemia no blood workup this time no complaints he is taking the same medication as before some joint pains but otherwise unremarkable review of system  The following portions of the patient's history were reviewed and updated as appropriate: allergies, current medications, past family history, past medical history, past social history, past surgical history and problem list     Review of Systems   Constitutional: Negative for appetite change, fatigue and fever  HENT: Negative for congestion, ear pain, hearing loss, nosebleeds, sneezing, tinnitus and voice change  Eyes: Negative for pain, discharge and redness  Respiratory: Negative for cough, chest tightness and wheezing  Cardiovascular: Negative for chest pain, palpitations and leg swelling  Gastrointestinal: Negative for abdominal pain, blood in stool, constipation, diarrhea, nausea and vomiting  Genitourinary: Negative for difficulty urinating, dysuria, hematuria and urgency  Musculoskeletal: Positive for back pain  Negative for arthralgias, gait problem and joint swelling  Pain in the hand and sometimes in the back   Skin: Negative for rash and wound  Allergic/Immunologic: Negative for environmental allergies  Neurological: Negative for dizziness, tremors, seizures, weakness, light-headedness and numbness  Hematological: Negative for adenopathy  Does not bruise/bleed easily  Psychiatric/Behavioral: Negative for behavioral problems and confusion  The patient is not nervous/anxious            Past Medical History:   Diagnosis Date    Allergic rhinitis     Resolved 1/14/2016     Allergy     Mild; spring and fall    Erectile dysfunction of nonorganic origin     Resolved 1/14/2016     GERD (gastroesophageal reflux disease)     Hyperlipidemia     Rosacea     Resolved 1/14/2016          Current Outpatient Medications:     atorvastatin (LIPITOR) 20 mg tablet, Take 1 tablet (20 mg total) by mouth daily, Disp: 90 tablet, Rfl: 1    losartan (COZAAR) 100 MG tablet, Take 1 tablet (100 mg total) by mouth daily, Disp: 90 tablet, Rfl: 3    omeprazole (PRILOSEC) 40 MG capsule, Take 40 mg by mouth daily , Disp: , Rfl:     vardenafil (LEVITRA) 20 MG tablet, Take 1 tablet (20 mg total) by mouth daily as needed for erectile dysfunction for up to 10 days (Patient not taking: Reported on 9/20/2019), Disp: 10 tablet, Rfl: 5    Allergies   Allergen Reactions    Neomycin-Polymyxin-Dexameth      [facility] - [id number]: skin peeled around eyes    Pollen Extract        Social History   Past Surgical History:   Procedure Laterality Date    FACIAL/NECK BIOPSY Right 1/9/2018    Procedure: CHEEK and LOWER EYELID BCC EXCISION AND COMPLEX CLOSURE;  Surgeon: Elmer Barillas MD;  [facility];  Service: Plastics     Family History   Problem Relation Age of Onset    Skin cancer Mother     No Known Problems Father        Objective:  /90 (BP Location: Left arm, Patient Position: Sitting, Cuff Size: Standard)   Pulse 100   Temp 97 9 °F (36 6 °C) (Oral)   Ht 6' 0 5" (1 842 m)   Wt 94 7 kg (208 lb 12 8 oz)   SpO2 99%   BMI 27 93 kg/m²        Physical Exam   Constitutional: He is oriented to person, place, and time  He appears well-developed and well-nourished  HENT:   Right Ear: External ear normal    Mouth/Throat: Oropharynx is clear and moist    Eyes: Pupils are equal, round, and reactive to light  Conjunctivae and EOM are normal    Neck: Normal range of motion  No JVD present  No thyromegaly present  Cardiovascular: Normal rate, regular rhythm, normal heart sounds and intact distal pulses  Pulmonary/Chest: Breath sounds normal    Abdominal: Soft  Bowel sounds are normal    Musculoskeletal: Normal range of motion  Lymphadenopathy:     He has no cervical adenopathy  Neurological: He is alert and oriented to person, place, and time  He has normal reflexes  Skin: Skin is dry  Psychiatric: He has a normal mood and affect  His behavior is normal  Judgment and thought content normal          No results found for this or any previous visit (from the past 672 hour(s))

## 2019-09-20 NOTE — ASSESSMENT & PLAN NOTE
Patient has a history of hypertension his hypertension is better controlled as compared to before he is on losartan 100 mg once a day I will continue with same dose of the medication because patient is under lot of stress because of the work I will see him back in about 4 months and see how his the numbers are also he did not get his blood workup to this time so he will get his complete blood workup about a week before his the next visit and hopefully we will adjust the dose or add something on his medication he denying any symptoms of hyper or hypotension

## 2019-09-20 NOTE — ASSESSMENT & PLAN NOTE
No problems with the hypercholesteremia medications he is taking his medication regularly no side effects he will be seeing him back in about 4 months with the blood workup this time the blood workup was not done

## 2019-12-02 DIAGNOSIS — E78.2 MIXED HYPERLIPIDEMIA: ICD-10-CM

## 2019-12-02 RX ORDER — ATORVASTATIN CALCIUM 20 MG/1
20 TABLET, FILM COATED ORAL DAILY
Qty: 90 TABLET | Refills: 1 | Status: SHIPPED | OUTPATIENT
Start: 2019-12-02 | End: 2020-06-10 | Stop reason: SDUPTHER

## 2019-12-02 NOTE — TELEPHONE ENCOUNTER
Needs refill on atorvastatin 20 mg sent to [facility] in [place]  He has about a week left but he is going to run out before his next appointment

## 2020-06-10 DIAGNOSIS — E78.2 MIXED HYPERLIPIDEMIA: ICD-10-CM

## 2020-06-10 RX ORDER — ATORVASTATIN CALCIUM 20 MG/1
20 TABLET, FILM COATED ORAL DAILY
Qty: 30 TABLET | Refills: 0 | Status: SHIPPED | OUTPATIENT
Start: 2020-06-10 | End: 2021-06-21

## 2020-06-11 DIAGNOSIS — I10 ESSENTIAL HYPERTENSION: ICD-10-CM

## 2020-06-15 RX ORDER — LOSARTAN POTASSIUM 100 MG/1
TABLET ORAL
Qty: 90 TABLET | Refills: 3 | Status: SHIPPED | OUTPATIENT
Start: 2020-06-15 | End: 2021-06-08

## 2020-06-19 ENCOUNTER — TELEPHONE (OUTPATIENT)
Dept: INTERNAL MEDICINE CLINIC | Age: 65
End: 2020-06-19

## 2020-08-04 ENCOUNTER — OFFICE VISIT (OUTPATIENT)
Dept: INTERNAL MEDICINE CLINIC | Age: 65
End: 2020-08-04
Payer: MEDICARE

## 2020-08-04 VITALS
OXYGEN SATURATION: 98 % | DIASTOLIC BLOOD PRESSURE: 72 MMHG | WEIGHT: 211.8 LBS | TEMPERATURE: 98.3 F | HEIGHT: 73 IN | HEART RATE: 93 BPM | BODY MASS INDEX: 28.07 KG/M2 | SYSTOLIC BLOOD PRESSURE: 124 MMHG

## 2020-08-04 DIAGNOSIS — K21.9 GASTROESOPHAGEAL REFLUX DISEASE WITHOUT ESOPHAGITIS: ICD-10-CM

## 2020-08-04 DIAGNOSIS — Z11.59 NEED FOR HEPATITIS C SCREENING TEST: Primary | ICD-10-CM

## 2020-08-04 DIAGNOSIS — E78.00 HYPERCHOLESTEROLEMIA: ICD-10-CM

## 2020-08-04 DIAGNOSIS — I10 ESSENTIAL HYPERTENSION: ICD-10-CM

## 2020-08-04 PROCEDURE — 3074F SYST BP LT 130 MM HG: CPT | Performed by: INTERNAL MEDICINE

## 2020-08-04 PROCEDURE — 3078F DIAST BP <80 MM HG: CPT | Performed by: INTERNAL MEDICINE

## 2020-08-04 PROCEDURE — 1036F TOBACCO NON-USER: CPT | Performed by: INTERNAL MEDICINE

## 2020-08-04 PROCEDURE — 3008F BODY MASS INDEX DOCD: CPT | Performed by: INTERNAL MEDICINE

## 2020-08-04 PROCEDURE — 99214 OFFICE O/P EST MOD 30 MIN: CPT | Performed by: INTERNAL MEDICINE

## 2020-08-04 NOTE — PROGRESS NOTES
Assessment/Plan:     Diagnoses and all orders for this visit:    Need for hepatitis C screening test  -     Hepatitis C antibody; Future    Essential hypertension  -     CBC and differential; Future  -     Comprehensive metabolic panel; Future  -     Lipid panel; Future  -     TSH, 3rd generation with Free T4 reflex; Future  -     UA w Reflex to Microscopic w Reflex to Culture  -     Hemoglobin A1C; Future    Hypercholesterolemia  -     CBC and differential; Future  -     Comprehensive metabolic panel; Future  -     Lipid panel; Future  -     TSH, 3rd generation with Free T4 reflex; Future  -     UA w Reflex to Microscopic w Reflex to Culture  -     Hemoglobin A1C; Future    Gastroesophageal reflux disease without esophagitis             Subjective:   Chief Complaint   Patient presents with    Follow-up     HTN   [address], annual physical and HIV screen due        Patient ID: Renata Mccord is a 72 y o  male  HPI  This is 72 years young gentleman with history of hypertension hypercholesterolemia and gastroesophageal reflux disease today's here for the regular follow-up no recent blood workup was done because of the COVID-19 I will order some blood workup    He is doing well no new problems  Hypertension is very well controlled weight is stable will continue with the losartan 100 once a day  Hypercholesterolemia will follow-up with the lipid panel and fasting blood sugar and hemoglobin A1c  Gastroesophageal reflux disease stable on omeprazole when he does not take omeprazole he gets this heartburns so he does not wanted to stop taking the medication I will recommend him to take it every other day if you can  The following portions of the patient's history were reviewed and updated as appropriate: allergies, current medications, past family history, past medical history, past social history, past surgical history and problem list     Review of Systems   Constitutional: Negative for appetite change, fatigue and fever  HENT: Negative for congestion, ear pain, hearing loss, nosebleeds, sneezing, tinnitus and voice change  Eyes: Negative for pain, discharge and redness  Respiratory: Negative for cough, chest tightness and wheezing  Cardiovascular: Negative for chest pain, palpitations and leg swelling  Gastrointestinal: Negative for abdominal pain, blood in stool, constipation, diarrhea, nausea and vomiting  Genitourinary: Negative for difficulty urinating, dysuria, hematuria and urgency  Musculoskeletal: Negative for arthralgias, back pain, gait problem and joint swelling  Skin: Negative for rash and wound  Allergic/Immunologic: Negative for environmental allergies  Neurological: Negative for dizziness, tremors, seizures, weakness, light-headedness and numbness  Hematological: Negative for adenopathy  Does not bruise/bleed easily  Psychiatric/Behavioral: Negative for behavioral problems and confusion  The patient is not nervous/anxious            Past Medical History:   Diagnosis Date    Allergic rhinitis     Resolved 1/14/2016     Allergy     Mild; spring and fall    Erectile dysfunction of nonorganic origin     Resolved 1/14/2016     GERD (gastroesophageal reflux disease)     Hyperlipidemia     Rosacea     Resolved 1/14/2016          Current Outpatient Medications:     atorvastatin (LIPITOR) 20 mg tablet, Take 1 tablet (20 mg total) by mouth daily, Disp: 30 tablet, Rfl: 0    losartan (COZAAR) 100 MG tablet, TAKE ONE TABLET BY MOUTH EVERY DAY, Disp: 90 tablet, Rfl: 3    omeprazole (PRILOSEC) 40 MG capsule, Take 40 mg by mouth daily , Disp: , Rfl:     vardenafil (LEVITRA) 20 MG tablet, Take 1 tablet (20 mg total) by mouth daily as needed for erectile dysfunction for up to 10 days (Patient not taking: Reported on 9/20/2019), Disp: 10 tablet, Rfl: 5    Allergies   Allergen Reactions    Neomycin-Polymyxin-Dexameth      [facility] - [id number]: skin peeled around eyes    Pollen Extract        Social History   Past Surgical History:   Procedure Laterality Date    FACIAL/NECK BIOPSY Right 1/9/2018    Procedure: CHEEK and LOWER EYELID BCC EXCISION AND COMPLEX CLOSURE;  Surgeon: Maximo Galo MD;  Location: [facility];  Service: Plastics     Family History   Problem Relation Age of Onset    Skin cancer Mother     No Known Problems Father        Objective:  /72 (BP Location: Left arm, Patient Position: Standing, Cuff Size: Standard)   Pulse 93   Temp 98 3 °F (36 8 °C) (Temporal)   Ht 6' 0 5"   Wt 96 1 kg (211 lb 12 8 oz)   SpO2 98%   BMI 28 33 kg/m²        Physical Exam   Constitutional: He is oriented to person, place, and time  He appears well-developed  HENT:   Right Ear: External ear normal    Eyes: Pupils are equal, round, and reactive to light  Conjunctivae are normal    Neck: Normal range of motion  No JVD present  No thyromegaly present  Cardiovascular: Normal rate, regular rhythm and normal heart sounds  Pulmonary/Chest: Breath sounds normal    Abdominal: Soft  Bowel sounds are normal    Musculoskeletal: Normal range of motion  Lymphadenopathy:     He has no cervical adenopathy  Neurological: He is alert and oriented to person, place, and time  He has normal reflexes  Skin: Skin is dry  Psychiatric: His behavior is normal  Judgment and thought content normal    Nursing note and vitals reviewed

## 2021-02-08 ENCOUNTER — TELEPHONE (OUTPATIENT)
Dept: OTHER | Facility: OTHER | Age: 66
End: 2021-02-08

## 2021-02-08 NOTE — TELEPHONE ENCOUNTER
Pt needs to reschedule appt for when Dr Melissa Martin is available  Please call him to schedule  Thank you!

## 2021-02-13 DIAGNOSIS — Z23 ENCOUNTER FOR IMMUNIZATION: ICD-10-CM

## 2021-03-05 ENCOUNTER — IMMUNIZATIONS (OUTPATIENT)
Dept: FAMILY MEDICINE CLINIC | Facility: HOSPITAL | Age: 66
End: 2021-03-05

## 2021-03-05 DIAGNOSIS — Z23 ENCOUNTER FOR IMMUNIZATION: Primary | ICD-10-CM

## 2021-03-05 PROCEDURE — 0001A SARS-COV-2 / COVID-19 MRNA VACCINE (PFIZER-BIONTECH) 30 MCG: CPT

## 2021-03-05 PROCEDURE — 91300 SARS-COV-2 / COVID-19 MRNA VACCINE (PFIZER-BIONTECH) 30 MCG: CPT

## 2021-03-26 ENCOUNTER — IMMUNIZATIONS (OUTPATIENT)
Dept: FAMILY MEDICINE CLINIC | Facility: HOSPITAL | Age: 66
End: 2021-03-26

## 2021-03-26 DIAGNOSIS — Z23 ENCOUNTER FOR IMMUNIZATION: Primary | ICD-10-CM

## 2021-03-26 PROCEDURE — 91300 SARS-COV-2 / COVID-19 MRNA VACCINE (PFIZER-BIONTECH) 30 MCG: CPT

## 2021-03-26 PROCEDURE — 0002A SARS-COV-2 / COVID-19 MRNA VACCINE (PFIZER-BIONTECH) 30 MCG: CPT

## 2021-04-29 ENCOUNTER — TELEPHONE (OUTPATIENT)
Dept: INTERNAL MEDICINE CLINIC | Facility: CLINIC | Age: 66
End: 2021-04-29

## 2021-04-29 NOTE — TELEPHONE ENCOUNTER
LMOM for pt to call me at the [facility] office  patient is overdue for a follow up and needs AWV with Dr Mandi Siddiqui  Please schedule

## 2021-04-30 NOTE — TELEPHONE ENCOUNTER
Pt is in tax season and wife, Zia Gonzalez said pt will call to make an appt after tax season is completed      Wife said hi to Dr Kellie Antonio

## 2021-06-08 DIAGNOSIS — I10 ESSENTIAL HYPERTENSION: ICD-10-CM

## 2021-06-08 RX ORDER — LOSARTAN POTASSIUM 100 MG/1
TABLET ORAL
Qty: 90 TABLET | Refills: 3 | Status: SHIPPED | OUTPATIENT
Start: 2021-06-08 | End: 2022-07-18 | Stop reason: SDUPTHER

## 2021-06-18 DIAGNOSIS — E78.2 MIXED HYPERLIPIDEMIA: ICD-10-CM

## 2021-06-21 RX ORDER — ATORVASTATIN CALCIUM 20 MG/1
TABLET, FILM COATED ORAL
Qty: 90 TABLET | Refills: 3 | Status: SHIPPED | OUTPATIENT
Start: 2021-06-21 | End: 2022-08-06 | Stop reason: SDUPTHER

## 2021-07-20 ENCOUNTER — TELEPHONE (OUTPATIENT)
Dept: INTERNAL MEDICINE CLINIC | Facility: CLINIC | Age: 66
End: 2021-07-20

## 2021-07-20 NOTE — TELEPHONE ENCOUNTER
Patient is due for a follow up and AWV with Dr Jaron Wells       [facility] for patient to call back and make follow up appointment

## 2021-10-15 NOTE — TELEPHONE ENCOUNTER
LMOM for pt to call me at [facility] office    Pt due for aWV  Please schedule before the end of the year

## 2021-11-04 ENCOUNTER — OFFICE VISIT (OUTPATIENT)
Dept: INTERNAL MEDICINE CLINIC | Age: 66
End: 2021-11-04
Payer: MEDICARE

## 2021-11-04 ENCOUNTER — APPOINTMENT (OUTPATIENT)
Dept: LAB | Facility: CLINIC | Age: 66
End: 2021-11-04
Payer: MEDICARE

## 2021-11-04 ENCOUNTER — PATIENT MESSAGE (OUTPATIENT)
Dept: INTERNAL MEDICINE CLINIC | Age: 66
End: 2021-11-04

## 2021-11-04 ENCOUNTER — HOSPITAL ENCOUNTER (OUTPATIENT)
Dept: CT IMAGING | Facility: HOSPITAL | Age: 66
Discharge: HOME | End: 2021-11-04
Payer: MEDICARE

## 2021-11-04 VITALS
SYSTOLIC BLOOD PRESSURE: 100 MMHG | DIASTOLIC BLOOD PRESSURE: 68 MMHG | HEIGHT: 72 IN | TEMPERATURE: 97.6 F | BODY MASS INDEX: 28.71 KG/M2 | HEART RATE: 97 BPM | WEIGHT: 212 LBS | OXYGEN SATURATION: 96 %

## 2021-11-04 DIAGNOSIS — R39.11 HESITANCY OF MICTURITION: ICD-10-CM

## 2021-11-04 DIAGNOSIS — R10.32 LEFT LOWER QUADRANT PAIN: ICD-10-CM

## 2021-11-04 DIAGNOSIS — R10.9 ACUTE LEFT FLANK PAIN: Primary | ICD-10-CM

## 2021-11-04 DIAGNOSIS — R10.9 ACUTE LEFT FLANK PAIN: ICD-10-CM

## 2021-11-04 DIAGNOSIS — K57.30 DIVERTICULOSIS OF COLON: ICD-10-CM

## 2021-11-04 DIAGNOSIS — I10 PRIMARY HYPERTENSION: ICD-10-CM

## 2021-11-04 DIAGNOSIS — R10.2 SUPRAPUBIC PAIN, ACUTE: ICD-10-CM

## 2021-11-04 DIAGNOSIS — E86.0 DEHYDRATION: ICD-10-CM

## 2021-11-04 DIAGNOSIS — K57.32 DIVERTICULITIS OF LARGE INTESTINE WITHOUT PERFORATION OR ABSCESS WITHOUT BLEEDING: Primary | ICD-10-CM

## 2021-11-04 DIAGNOSIS — N20.0 NEPHROLITHIASIS: ICD-10-CM

## 2021-11-04 LAB
ALBUMIN SERPL BCP-MCNC: 3.9 G/DL (ref 3.5–5)
ALP SERPL-CCNC: 71 U/L (ref 46–116)
ALT SERPL W P-5'-P-CCNC: 32 U/L (ref 12–78)
ANION GAP SERPL CALCULATED.3IONS-SCNC: 12 MMOL/L (ref 4–13)
AST SERPL W P-5'-P-CCNC: 16 U/L (ref 5–45)
BASOPHILS # BLD AUTO: 0.04 THOUSANDS/ΜL (ref 0–0.1)
BASOPHILS NFR BLD AUTO: 0 % (ref 0–1)
BILIRUB SERPL-MCNC: 1.28 MG/DL (ref 0.2–1)
BILIRUB UR QL STRIP: ABNORMAL
BUN SERPL-MCNC: 16 MG/DL (ref 5–25)
CALCIUM SERPL-MCNC: 9.1 MG/DL (ref 8.3–10.1)
CHLORIDE SERPL-SCNC: 100 MMOL/L (ref 100–108)
CLARITY UR: CLEAR
CO2 SERPL-SCNC: 27 MMOL/L (ref 21–32)
COLOR UR: ABNORMAL
CREAT SERPL-MCNC: 1.54 MG/DL (ref 0.6–1.3)
EOSINOPHIL # BLD AUTO: 0.06 THOUSAND/ΜL (ref 0–0.61)
EOSINOPHIL NFR BLD AUTO: 1 % (ref 0–6)
ERYTHROCYTE [DISTWIDTH] IN BLOOD BY AUTOMATED COUNT: 12.5 % (ref 11.6–15.1)
GFR SERPL CREATININE-BSD FRML MDRD: 46 ML/MIN/1.73SQ M
GLUCOSE SERPL-MCNC: 94 MG/DL (ref 65–140)
GLUCOSE UR STRIP-MCNC: NEGATIVE MG/DL
HCT VFR BLD AUTO: 47.5 % (ref 36.5–49.3)
HGB BLD-MCNC: 15.1 G/DL (ref 12–17)
HGB UR QL STRIP.AUTO: NEGATIVE
IMM GRANULOCYTES # BLD AUTO: 0.03 THOUSAND/UL (ref 0–0.2)
IMM GRANULOCYTES NFR BLD AUTO: 0 % (ref 0–2)
KETONES UR STRIP-MCNC: ABNORMAL MG/DL
LEUKOCYTE ESTERASE UR QL STRIP: NEGATIVE
LYMPHOCYTES # BLD AUTO: 2.03 THOUSANDS/ΜL (ref 0.6–4.47)
LYMPHOCYTES NFR BLD AUTO: 19 % (ref 14–44)
MCH RBC QN AUTO: 29.8 PG (ref 26.8–34.3)
MCHC RBC AUTO-ENTMCNC: 31.8 G/DL (ref 31.4–37.4)
MCV RBC AUTO: 94 FL (ref 82–98)
MONOCYTES # BLD AUTO: 1.18 THOUSAND/ΜL (ref 0.17–1.22)
MONOCYTES NFR BLD AUTO: 11 % (ref 4–12)
NEUTROPHILS # BLD AUTO: 7.27 THOUSANDS/ΜL (ref 1.85–7.62)
NEUTS SEG NFR BLD AUTO: 69 % (ref 43–75)
NITRITE UR QL STRIP: NEGATIVE
NRBC BLD AUTO-RTO: 0 /100 WBCS
PH UR STRIP.AUTO: 5.5 [PH]
PLATELET # BLD AUTO: 230 THOUSANDS/UL (ref 149–390)
PMV BLD AUTO: 10 FL (ref 8.9–12.7)
POTASSIUM SERPL-SCNC: 3.8 MMOL/L (ref 3.5–5.3)
PROT SERPL-MCNC: 7.5 G/DL (ref 6.4–8.2)
PROT UR STRIP-MCNC: NEGATIVE MG/DL
RBC # BLD AUTO: 5.07 MILLION/UL (ref 3.88–5.62)
SL AMB  POCT GLUCOSE, UA: NORMAL
SL AMB LEUKOCYTE ESTERASE,UA: NORMAL
SL AMB POCT BILIRUBIN,UA: NORMAL
SL AMB POCT BLOOD,UA: NORMAL
SL AMB POCT CLARITY,UA: CLEAR
SL AMB POCT COLOR,UA: YELLOW
SL AMB POCT KETONES,UA: 15
SL AMB POCT NITRITE,UA: NORMAL
SL AMB POCT PH,UA: 5
SL AMB POCT SPECIFIC GRAVITY,UA: 1.03
SL AMB POCT URINE PROTEIN: NORMAL
SL AMB POCT UROBILINOGEN: 0.2
SODIUM SERPL-SCNC: 139 MMOL/L (ref 136–145)
SP GR UR STRIP.AUTO: 1.02 (ref 1–1.03)
TSH SERPL DL<=0.05 MIU/L-ACNC: 1.91 UIU/ML (ref 0.36–3.74)
UROBILINOGEN UR QL STRIP.AUTO: 0.2 E.U./DL
WBC # BLD AUTO: 10.61 THOUSAND/UL (ref 4.31–10.16)

## 2021-11-04 PROCEDURE — 84443 ASSAY THYROID STIM HORMONE: CPT

## 2021-11-04 PROCEDURE — 36415 COLL VENOUS BLD VENIPUNCTURE: CPT

## 2021-11-04 PROCEDURE — 81003 URINALYSIS AUTO W/O SCOPE: CPT | Performed by: INTERNAL MEDICINE

## 2021-11-04 PROCEDURE — 74177 CT ABD & PELVIS W/CONTRAST: CPT

## 2021-11-04 PROCEDURE — G1004 CDSM NDSC: HCPCS

## 2021-11-04 PROCEDURE — 80053 COMPREHEN METABOLIC PANEL: CPT

## 2021-11-04 PROCEDURE — 85025 COMPLETE CBC W/AUTO DIFF WBC: CPT

## 2021-11-04 PROCEDURE — 99214 OFFICE O/P EST MOD 30 MIN: CPT | Performed by: INTERNAL MEDICINE

## 2021-11-04 RX ORDER — METRONIDAZOLE 500 MG/1
500 TABLET ORAL EVERY 8 HOURS SCHEDULED
Qty: 21 TABLET | Refills: 0 | Status: SHIPPED | OUTPATIENT
Start: 2021-11-04 | End: 2021-11-11

## 2021-11-04 RX ORDER — CIPROFLOXACIN 500 MG/1
500 TABLET, FILM COATED ORAL EVERY 12 HOURS SCHEDULED
Qty: 14 TABLET | Refills: 0 | Status: SHIPPED | OUTPATIENT
Start: 2021-11-04 | End: 2021-11-11

## 2021-11-04 RX ADMIN — IOHEXOL 100 ML: 350 INJECTION, SOLUTION INTRAVENOUS at 18:14

## 2021-11-04 RX ADMIN — IOHEXOL 50 ML: 240 INJECTION, SOLUTION INTRATHECAL; INTRAVASCULAR; INTRAVENOUS; ORAL at 18:06

## 2021-11-11 ENCOUNTER — OFFICE VISIT (OUTPATIENT)
Dept: INTERNAL MEDICINE CLINIC | Age: 66
End: 2021-11-11
Payer: MEDICARE

## 2021-11-11 VITALS
WEIGHT: 210.1 LBS | HEIGHT: 72 IN | SYSTOLIC BLOOD PRESSURE: 102 MMHG | OXYGEN SATURATION: 99 % | DIASTOLIC BLOOD PRESSURE: 62 MMHG | TEMPERATURE: 97.3 F | BODY MASS INDEX: 28.46 KG/M2 | HEART RATE: 77 BPM

## 2021-11-11 DIAGNOSIS — E86.0 DEHYDRATION: ICD-10-CM

## 2021-11-11 DIAGNOSIS — Z23 NEED FOR INFLUENZA VACCINATION: ICD-10-CM

## 2021-11-11 DIAGNOSIS — Z12.5 SCREENING FOR PROSTATE CANCER: ICD-10-CM

## 2021-11-11 DIAGNOSIS — Z00.00 MEDICARE ANNUAL WELLNESS VISIT, INITIAL: ICD-10-CM

## 2021-11-11 DIAGNOSIS — E78.00 HYPERCHOLESTEROLEMIA: ICD-10-CM

## 2021-11-11 DIAGNOSIS — N20.0 NEPHROLITHIASIS: ICD-10-CM

## 2021-11-11 DIAGNOSIS — K57.32 DIVERTICULITIS OF LARGE INTESTINE WITHOUT PERFORATION OR ABSCESS WITHOUT BLEEDING: Primary | ICD-10-CM

## 2021-11-11 DIAGNOSIS — I10 PRIMARY HYPERTENSION: ICD-10-CM

## 2021-11-11 PROCEDURE — 90662 IIV NO PRSV INCREASED AG IM: CPT

## 2021-11-11 PROCEDURE — G0008 ADMIN INFLUENZA VIRUS VAC: HCPCS

## 2021-11-11 PROCEDURE — 1123F ACP DISCUSS/DSCN MKR DOCD: CPT | Performed by: INTERNAL MEDICINE

## 2021-11-11 PROCEDURE — 93000 ELECTROCARDIOGRAM COMPLETE: CPT | Performed by: INTERNAL MEDICINE

## 2021-11-11 PROCEDURE — 99214 OFFICE O/P EST MOD 30 MIN: CPT | Performed by: INTERNAL MEDICINE

## 2021-11-11 PROCEDURE — G0438 PPPS, INITIAL VISIT: HCPCS | Performed by: INTERNAL MEDICINE

## 2021-12-11 ENCOUNTER — IMMUNIZATIONS (OUTPATIENT)
Dept: FAMILY MEDICINE CLINIC | Facility: HOSPITAL | Age: 66
End: 2021-12-11

## 2021-12-11 DIAGNOSIS — Z23 ENCOUNTER FOR IMMUNIZATION: Primary | ICD-10-CM

## 2021-12-11 PROCEDURE — 91300 COVID-19 PFIZER VACC 0.3 ML: CPT

## 2021-12-11 PROCEDURE — 0001A COVID-19 PFIZER VACC 0.3 ML: CPT

## 2021-12-28 ENCOUNTER — TELEMEDICINE (OUTPATIENT)
Dept: INTERNAL MEDICINE CLINIC | Facility: CLINIC | Age: 66
End: 2021-12-28
Payer: MEDICARE

## 2021-12-28 VITALS
HEART RATE: 94 BPM | OXYGEN SATURATION: 98 % | TEMPERATURE: 97.6 F | SYSTOLIC BLOOD PRESSURE: 136 MMHG | DIASTOLIC BLOOD PRESSURE: 88 MMHG

## 2021-12-28 DIAGNOSIS — M25.552 LEFT HIP PAIN: ICD-10-CM

## 2021-12-28 DIAGNOSIS — U07.1 COVID-19 VIRUS INFECTION: Primary | ICD-10-CM

## 2021-12-28 PROCEDURE — U0005 INFEC AGEN DETEC AMPLI PROBE: HCPCS | Performed by: INTERNAL MEDICINE

## 2021-12-28 PROCEDURE — 99213 OFFICE O/P EST LOW 20 MIN: CPT | Performed by: INTERNAL MEDICINE

## 2021-12-28 PROCEDURE — U0003 INFECTIOUS AGENT DETECTION BY NUCLEIC ACID (DNA OR RNA); SEVERE ACUTE RESPIRATORY SYNDROME CORONAVIRUS 2 (SARS-COV-2) (CORONAVIRUS DISEASE [COVID-19]), AMPLIFIED PROBE TECHNIQUE, MAKING USE OF HIGH THROUGHPUT TECHNOLOGIES AS DESCRIBED BY CMS-2020-01-R: HCPCS | Performed by: INTERNAL MEDICINE

## 2022-01-03 ENCOUNTER — TELEPHONE (OUTPATIENT)
Dept: HEMATOLOGY ONCOLOGY | Facility: CLINIC | Age: 67
End: 2022-01-03

## 2022-01-08 ENCOUNTER — APPOINTMENT (OUTPATIENT)
Dept: RADIOLOGY | Age: 67
End: 2022-01-08
Payer: MEDICARE

## 2022-01-08 DIAGNOSIS — M25.552 LEFT HIP PAIN: ICD-10-CM

## 2022-01-08 PROCEDURE — 73502 X-RAY EXAM HIP UNI 2-3 VIEWS: CPT

## 2022-01-10 ENCOUNTER — DOCUMENTATION (OUTPATIENT)
Dept: HEMATOLOGY ONCOLOGY | Facility: CLINIC | Age: 67
End: 2022-01-10

## 2022-01-14 DIAGNOSIS — M25.552 LEFT HIP PAIN: Primary | ICD-10-CM

## 2022-01-14 NOTE — PROGRESS NOTES
X-ray left hip discussed with patient  Patient would prefer to be seen by orthopedic    Will place consultation request

## 2022-01-17 ENCOUNTER — CONSULT (OUTPATIENT)
Dept: HEMATOLOGY ONCOLOGY | Facility: CLINIC | Age: 67
End: 2022-01-17
Payer: MEDICARE

## 2022-01-17 VITALS
HEIGHT: 72 IN | TEMPERATURE: 98 F | DIASTOLIC BLOOD PRESSURE: 80 MMHG | HEART RATE: 100 BPM | BODY MASS INDEX: 28.44 KG/M2 | SYSTOLIC BLOOD PRESSURE: 142 MMHG | WEIGHT: 210 LBS | RESPIRATION RATE: 18 BRPM | OXYGEN SATURATION: 96 %

## 2022-01-17 DIAGNOSIS — C43.59 MALIGNANT MELANOMA OF TORSO EXCLUDING BREAST (HCC): Primary | ICD-10-CM

## 2022-01-17 PROCEDURE — 99204 OFFICE O/P NEW MOD 45 MIN: CPT | Performed by: INTERNAL MEDICINE

## 2022-01-17 NOTE — LETTER
January 27, 2022     Susie Beavers, [address]    Patient: Dayanara Douglas   YOB: 1955   Date of Visit: 1/17/2022       Dear Dr Kellie Antonio:    Thank you for referring Dayanara Douglas to me for evaluation  Below are my notes for this consultation  If you have questions, please do not hesitate to call me  I look forward to following your patient along with you  Sincerely,        Eze Cuevas MD        CC: RODNEY Guzman MD Luciana Columbus, MD Zoanne Mola, MD  1/27/2022  5:18 PM  Sign when Signing Visit  [address]  [phone]  [phone]     Date of Visit: 1/17/2022  Name: Dayanara Douglas   YOB: 1955       Subjective    VISIT DIAGNOSIS:  Diagnoses and all orders for this visit:    Malignant melanoma of torso excluding breast ([facility] [address] )  -     CBC and differential  -     Comprehensive metabolic panel  -     LD,Blood  -     Ambulatory Referral to Surgical Oncology; Future        Oncology History   Malignant melanoma of upper back ([facility] [address] )   12/20/2021 Biopsy    Left upper back Melamona: shave biopsy  Thickness: 0 8mm  Ulceration: none   Mitoses: 0  Margins: Approaches both lateral margins and extends to the deep margin      1/17/2022 Initial Diagnosis    Malignant melanoma of upper back [facility])        Cancer Staging  Malignant melanoma of [facility] back [facility])  Staging form: Melanoma of the Skin, AJCC 8th Edition  - Clinical stage from 12/20/2021: Stage IB (cT1b, cN0, cM0) - Signed by Eze Cuevas MD on 1/27/2022     [No treatment plan]     HISTORY OF PRESENT ILLNESS: Dayanara Douglas is a 79 y o  male who is seen in consultation at the request of Dr Jovanni Rodgers for a new diagnosis of melanoma of the left upper back  The history is obtained from the patient, review of records, and discussion with our surgeon Dr Radha Calabrese         His wife noticed a new mole on the back of his left shoulder in September  He describes it as being the size of a quarter, slightly raised, scaly and bluish pigmented  Denies itching, bleeding, or pain of the lesion  Nothing made it worse or better  He did have routine dermatology exams prior to now     He underwent a biopsy on 12/20/21 of the upper back lesion which demonstrated malignant melanoma negative for ulceration, 0 8 mm Breslow thickness, no mitoses, and + deep margin  He has not had a consult with surgical oncology yet  He is doing well and keeps the biopsy covered and bandaged  He denies any other new, changing, or concerning lesions at this time  He denies a previous history of melanoma  He had BCC/SCC  He describes having a lot of sun exposure when younger  He describes having an average number of sunburns  He describes having no blistering sunburns  He denies tanning bed use  He had blonde hair when he was younger, has green eyes, and his ancestry is [place], and [place]  He has a family history of [address] in his mother  He denies a family history of melanoma, ovarian, breast, or pancreatic cancer  He is up to date on colonoscopy and PSA checks  Denies smoking, occasional alcohol, and no drug use  Rarely smokes a cigar  REVIEW OF SYSTEMS:  Review of Systems   Constitutional: Negative for appetite change, fatigue, fever and unexpected weight change  HENT:   Negative for lump/mass  Eyes: Negative for icterus  Respiratory: Negative for cough, shortness of breath and wheezing  Cardiovascular: Negative for leg swelling  Gastrointestinal: Negative for abdominal pain, constipation, diarrhea, nausea and vomiting  Genitourinary: Negative for difficulty urinating and hematuria  Musculoskeletal: Negative for arthralgias, gait problem and myalgias  Skin: Negative for itching and rash  No new, changing, or concerning lesions  Neurological: Negative for extremity weakness, gait problem, headaches, light-headedness and numbness  Hematological: Negative for adenopathy          MEDICATIONS:    Current Outpatient Medications:     atorvastatin (LIPITOR) 20 mg tablet, TAKE ONE TABLET BY MOUTH EVERY DAY, Disp: 90 tablet, Rfl: 3    losartan (COZAAR) 100 MG tablet, TAKE ONE TABLET BY MOUTH EVERY DAY, Disp: 90 tablet, Rfl: 3    omeprazole (PRILOSEC) 40 MG capsule, Take 40 mg by mouth daily Every other day, Disp: , Rfl:     traMADol (Ultram) 50 mg tablet, Take 1 tablet (50 mg total) by mouth every 6 (six) hours as needed for moderate pain, Disp: 12 tablet, Rfl: 0     ALLERGIES:  Allergies   Allergen Reactions    Neomycin-Polymyxin-Dexameth      [facility] - [id number]: skin peeled around eyes    Pollen Extract         ACTIVE PROBLEMS:  Patient Active Problem List   Diagnosis    HTN (hypertension)    Hypercholesterolemia    Numerous moles    GERD (gastroesophageal reflux disease)    Acute left flank pain    Left lower quadrant pain    Diverticulosis of colon    Suprapubic pain, acute    Dehydration    BMI 28 0-28 9,adult    Diverticulitis of large intestine without perforation or abscess without bleeding    Nephrolithiasis    COVID-19 virus infection    Left hip pain    Malignant melanoma of upper back ([facility] [address] )          PAST MEDICAL HISTORY:   Past Medical History:   Diagnosis Date    Allergic rhinitis     Resolved 1/14/2016     Allergy     Mild; spring and fall    Erectile dysfunction of nonorganic origin     Resolved 1/14/2016     GERD (gastroesophageal reflux disease)     Hyperlipidemia     Rosacea     Resolved 1/14/2016         PAST SURGICAL HISTORY:  Past Surgical History:   Procedure Laterality Date    FACIAL/NECK BIOPSY Right 1/9/2018    Procedure: CHEEK and LOWER EYELID BCC EXCISION AND COMPLEX CLOSURE;  Surgeon: Tosha Flores MD;  [facility];  Service: Plastics        SOCIAL HISTORY:  Social History     Socioeconomic History    Marital status: /Civil Union     Spouse name: None    Number of children: None    Years of education: None    Highest education level: None   Occupational History    None   Tobacco Use    Smoking status: Light Tobacco Smoker     Types: Cigars    Smokeless tobacco: Never Used    Tobacco comment: cigar 6 times a year   Vaping Use    Vaping Use: Never used   Substance and Sexual Activity    Alcohol use: Yes     Alcohol/week: 1 0 standard drink     Types: 1 Shots of liquor per week     Comment: occasionally    Drug use: No    Sexual activity: None   Other Topics Concern    None   Social History Narrative    Former smoker - As per [employer]    Never used drugs - As per [employer]      Social Determinants of Health     Financial Resource Strain: Not on file   Food Insecurity: Not on file   Transportation Needs: Not on file   Physical Activity: Not on file   Stress: Not on file   Social Connections: Not on file   Intimate Partner Violence: Not on file   Housing Stability: Not on file        FAMILY HISTORY:  Family History   Problem Relation Age of Onset    Skin cancer Mother     No Known Problems Father           Objective    PHYSICAL EXAMINATION:   Blood pressure 142/80, pulse 100, temperature 98 °F (36 7 °C), resp  rate 18, height 6' (1 829 m), weight 95 3 kg (210 lb), SpO2 96 %  ECOG Performance Status      Most Recent Value   ECOG Performance Status 0 - Fully active, able to carry on all pre-disease performance without restriction             Physical Exam  Constitutional:       General: He is not in acute distress  Appearance: Normal appearance  He is not toxic-appearing  HENT:      Mouth/Throat:      Mouth: Mucous membranes are moist       Pharynx: Oropharynx is clear  Eyes:      General: No scleral icterus  Cardiovascular:      Rate and Rhythm: Normal rate and regular rhythm  Pulses: Normal pulses  Heart sounds: No murmur heard    No friction rub  No gallop  Pulmonary:      Effort: Pulmonary effort is normal  No respiratory distress  Breath sounds: Normal breath sounds  No wheezing or rales  Chest:   Breasts:      Right: No axillary adenopathy or supraclavicular adenopathy  Left: No axillary adenopathy or supraclavicular adenopathy  Abdominal:      General: There is no distension  Palpations: There is no mass  Tenderness: There is no abdominal tenderness  There is no rebound  Musculoskeletal:         General: No swelling or tenderness  Right lower leg: No edema  Left lower leg: No edema  Lymphadenopathy:      Head:      Right side of head: No submandibular, preauricular or posterior auricular adenopathy  Left side of head: No submandibular, preauricular or posterior auricular adenopathy  Cervical: No cervical adenopathy  Right cervical: No superficial or posterior cervical adenopathy  Left cervical: No superficial or posterior cervical adenopathy  Upper Body:      Right upper body: No supraclavicular or axillary adenopathy  Left upper body: No supraclavicular or axillary adenopathy  Lower Body: No right inguinal adenopathy  No left inguinal adenopathy  Skin:     Findings: No rash  Comments: Healing biopsy site  No evidence of recurrence at primary site  Neurological:      General: No focal deficit present  Mental Status: He is alert and oriented to person, place, and time  Psychiatric:         Mood and Affect: Mood normal          Behavior: Behavior normal          Thought Content: Thought content normal          Judgment: Judgment normal          I reviewed lab data in the chart    Results for Iam Gonzalez (MRN [id number]) as of 1/27/2022 17:12   Ref   Range 11/4/2021 16:47   Sodium Latest Ref Range: 136 - 145 mmol/L 139   Potassium Latest Ref Range: 3 5 - 5 3 mmol/L 3 8   Chloride Latest Ref Range: 100 - 108 mmol/L 100   CO2 Latest Ref Range: 21 - 32 mmol/L 27   Anion Gap Latest Ref Range: 4 - 13 mmol/L 12   BUN Latest Ref Range: 5 - 25 mg/dL 16   Creatinine Latest Ref Range: 0 60 - 1 30 mg/dL 1 54 (H)   Glucose, Random Latest Ref Range: 65 - 140 mg/dL 94   Calcium Latest Ref Range: 8 3 - 10 1 mg/dL 9 1   AST Latest Ref Range: 5 - 45 U/L 16   ALT Latest Ref Range: 12 - 78 U/L 32   Alkaline Phosphatase Latest Ref Range: 46 - 116 U/L 71   Total Protein Latest Ref Range: 6 4 - 8 2 g/dL 7 5   Albumin Latest Ref Range: 3 5 - 5 0 g/dL 3 9   TOTAL BILIRUBIN Latest Ref Range: 0 20 - 1 00 mg/dL 1 28 (H)   eGFR Latest Units: ml/min/1 73sq m 46   WBC Latest Ref Range: 4 31 - 10 16 Thousand/uL 10 61 (H)   Red Blood Cell Count Latest Ref Range: 3 88 - 5 62 Million/uL 5 07   Hemoglobin Latest Ref Range: 12 0 - 17 0 g/dL 15 1   HCT Latest Ref Range: 36 5 - 49 3 % 47 5   MCV Latest Ref Range: 82 - 98 fL 94   MCH Latest Ref Range: 26 8 - 34 3 pg 29 8   MCHC Latest Ref Range: 31 4 - 37 4 g/dL 31 8   RDW Latest Ref Range: 11 6 - 15 1 % 12 5   Platelet Count Latest Ref Range: 149 - 390 Thousands/uL 230   MPV Latest Ref Range: 8 9 - 12 7 fL 10 0   nRBC Latest Units: /100 WBCs 0   Neutrophils % Latest Ref Range: 43 - 75 % 69   Immat GRANS % Latest Ref Range: 0 - 2 % 0   Lymphocytes Relative Latest Ref Range: 14 - 44 % 19   Monocytes Relative Latest Ref Range: 4 - 12 % 11   Eosinophils Latest Ref Range: 0 - 6 % 1   Basophils Relative Latest Ref Range: 0 - 1 % 0   Immature Grans Absolute Latest Ref Range: 0 00 - 0 20 Thousand/uL 0 03   Absolute Neutrophils Latest Ref Range: 1 85 - 7 62 Thousands/µL 7 27   Lymphocytes Absolute Latest Ref Range: 0 60 - 4 47 Thousands/µL 2 03   Absolute Monocytes Latest Ref Range: 0 17 - 1 22 Thousand/µL 1 18   Absolute Eosinophils Latest Ref Range: 0 00 - 0 61 Thousand/µL 0 06   Basophils Absolute Latest Ref Range: 0 00 - 0 10 Thousands/µL 0 04   TSH 3RD GENERATON Latest Ref Range: 0 358 - 3 740 uIU/mL 1 908       No results found for: TSH  No results found for: F2MOCQZ   No results found for: FREET4      RECENT IMAGING:  Procedure: XR hip/pelv 2-3 vws left if performed    Result Date: 1/12/2022  Narrative: LEFT HIP INDICATION:   M25 [id number]: Pain in left hip  COMPARISON:  None VIEWS:  XR HIP/PELV 2-3 VWS LEFT  W PELVIS IF PERFORMED FINDINGS: There is no acute fracture or dislocation  There is mild bilateral hip osteoarthritis  No lytic or blastic osseous lesion  Soft tissues are unremarkable  Degenerative changes visualized lower lumbar spine  Impression: Mild bilateral hip osteoarthritis and degenerative changes at the lumbosacral junction No acute osseous abnormality Workstation performed: [id number]            Assessment/Plan  Mr Noble Cherry is a 79 yr male with newly diagnosed melanoma, as least Stage IB ([id number]), here for disease management discussion    1  Malignant melanoma of torso excluding breast ([facility] [address] )  I had an extensive discussion with the patient and his spouse regarding his  diagnosis, prognosis, and recommendations for further management  We reviewed his melanoma history, current findings, pathology and imaging tests  We discussed that he is at least a Stage IB based on the pathological features of his melanoma  We discussed that he will need a WLE and SLNbx for definitve treatment for his melanoma as well as staging  We discussed that all recommendations will be based on final pathology and staging  We ordered baseline labs, including LDH, to be performed  Scripts provided  We also placed referral to surgical oncology for the WLE and SLNbx  In general, we discussed that he will require ongoing monitoring and surveillance, both for disease recurrence as well as a new primary melanoma as well as other nonmelanoma skin cancers  This includes physical exam and labs, including a comprehensive metabolic panel, CBC with differential and LDH  We discussed that he should also have close dermatology follow up      We discussed the importance of regular cutaneous self examinations and reviewed the features of lesions that could be concerning for a primary melanoma  I did explain that he  is at risk for developing another primary melanoma as well  We also discussed avoidance of unnecessary sun exposure and use of sun protective clothing and sunscreen  I also recommended routine follow up and skin checks with dermatology  Family Screening: his  first-degree relatives, namely his siblings, parents, and children, have an increased risk of developing a melanoma over the general population given his  diagnosis of melanoma, and should have annual dermatologic screening  He will return two weeks after surgery for follow up  - CBC and differential  - Comprehensive metabolic panel  - LD,Blood  - Ambulatory Referral to Surgical Oncology; Future    Mr Nereyda Vela had all his questions and concerns addressed and he knows to call with any additional issues  Thank you for allowing me to participate in Mr Vallejarrett Wiley ana Gorman MD, PhD

## 2022-01-18 ENCOUNTER — TELEPHONE (OUTPATIENT)
Dept: HEMATOLOGY ONCOLOGY | Facility: CLINIC | Age: 67
End: 2022-01-18

## 2022-01-18 NOTE — TELEPHONE ENCOUNTER
Intra-Department Referral    Patient Details:  Robb Adams  1955  [id number]   Background Information:  [address] starts by opening a telephone encounter and gathering the following information   What department is patient being referred to? Surg    Who is calling to schedule and relationship?    Patient    Diagnosis Malignant melanoma of torso excluding breast    What department was patient seen in last?       Med onc    Miscellaneous:

## 2022-01-21 ENCOUNTER — DOCUMENTATION (OUTPATIENT)
Dept: HEMATOLOGY ONCOLOGY | Facility: CLINIC | Age: 67
End: 2022-01-21

## 2022-01-21 ENCOUNTER — TELEPHONE (OUTPATIENT)
Dept: SURGICAL ONCOLOGY | Facility: CLINIC | Age: 67
End: 2022-01-21

## 2022-01-21 NOTE — PROGRESS NOTES
Cutaneous Oncology Working Group Case Review    DATE: 1/21/22     PRESENTING DOCTOR: Dr Vern Pham    DIAGNOSIS: Stage 1B Melanoma of the back    Tawnya Liu is a 79 y o  male who was presented at the Cutaneous Oncology Working Group today for case review and treatment planning  PHYSICIAN RECOMMENDED PLAN:   Surg Onc consultation for WLE and SLNBx with Dr Elmer Wolf on 1/25    Pathology reviewed:  chad    Referrals:  Surg Onc 1/25    Procedures:  n/a    Team agreed to plan  The final treatment plan will be left to the discretion of the patient and the treating physician  DISCLAIMERS:    TO THE TREATING PHYSICIAN:  This conference is a meeting of clinicians from various specialty areas who evaluate and discuss patients for whom a multidisciplinary treatment approach is being considered  Please note that the above opinion was a consensus of the conference attendees and is intended only to assist in quality care of the discussed patient  The responsibility for follow up on the input given during the conference, along with any final decisions regarding plan of care, is that of the patient and the patient's provider  Accordingly, appointments have only been recommended based on this information and have NOT been scheduled unless otherwise noted  TO THE PATIENT:  This summary is a brief record of major aspects of your cancer treatment  You may choose to share a copy with any of your doctors or nurses  However, this is not a detailed or comprehensive record of your care  NCCN guidelines were available for review during this discussion

## 2022-01-21 NOTE — PROGRESS NOTES
Date pathology slides received and sent to pathology department: 1/21/2022    Slides received from: [address] [place] [address] were sent via: interoffice mail    Slides sent attention to: [address]    Message sent to team: yes

## 2022-01-21 NOTE — TELEPHONE ENCOUNTER
 Hello, can I please speak to (patient name) this is (enter your name here) calling from [facility]) to remind you of your appointment on (date and time) at (location)  I am calling to review our no-show/cancelation policy and complete your COVID screening questions  Do you have a few minutes? We ask that you come at least 15 minutes early for your appointment to complete all paperwork, if you are 20 minutes late for your appointment, we may need to reschedule you  We require at least 24-hour notice for cancelations and if you miss your appointment 3 times, we may unfortunately not be able to reschedule your future visit  Considering the current events related to the COVID-19 virus and in being proactive and making sure we are keeping our patients, their families and staff safe, we are screening prior to all patient appointments      1  Are you currently experiencing any symptoms of fever, cough, shortness of breath, chills, repeated shaking with chills, muscle pain, headache, sore throat, or new loss of taste/smell?       ? No - continue to the next question     2  Have you been tested for COVID-19 within the past 5 days? ?  No - continue with visit

## 2022-01-25 ENCOUNTER — CONSULT (OUTPATIENT)
Dept: SURGICAL ONCOLOGY | Facility: CLINIC | Age: 67
End: 2022-01-25
Payer: MEDICARE

## 2022-01-25 VITALS
RESPIRATION RATE: 16 BRPM | HEART RATE: 68 BPM | SYSTOLIC BLOOD PRESSURE: 118 MMHG | TEMPERATURE: 98 F | HEIGHT: 72 IN | BODY MASS INDEX: 28.44 KG/M2 | WEIGHT: 210 LBS | DIASTOLIC BLOOD PRESSURE: 76 MMHG | OXYGEN SATURATION: 99 %

## 2022-01-25 DIAGNOSIS — C43.59 MALIGNANT MELANOMA OF UPPER BACK (HCC): Primary | ICD-10-CM

## 2022-01-25 DIAGNOSIS — C43.59 MALIGNANT MELANOMA OF TORSO EXCLUDING BREAST (HCC): ICD-10-CM

## 2022-01-25 PROCEDURE — 99204 OFFICE O/P NEW MOD 45 MIN: CPT | Performed by: STUDENT IN AN ORGANIZED HEALTH CARE EDUCATION/TRAINING PROGRAM

## 2022-01-25 RX ORDER — TRAMADOL HYDROCHLORIDE 50 MG/1
50 TABLET ORAL EVERY 6 HOURS PRN
Qty: 12 TABLET | Refills: 0 | Status: SHIPPED | OUTPATIENT
Start: 2022-01-25 | End: 2022-08-10 | Stop reason: ALTCHOICE

## 2022-01-25 NOTE — PROGRESS NOTES
Surgical Oncology Consultation    [address]  [facility] [address] [place] [address]    Patient:  Dayanara Douglas  1955  [id number]    [facility]  [facility]    Referring provider:  Chey Verde, [address],  [address] [facility]    Diagnoses and all orders for this visit:    Malignant melanoma of upper back [facility])    Malignant melanoma of torso excluding breast [facility])  -     Ambulatory Referral to Surgical Oncology        Chief Complaint   Patient presents with    Consult       No follow-ups on file  Oncology History   Malignant melanoma of upper back ([facility] [address] )   12/20/2021 Biopsy    Left upper back Melamona: shave biopsy  Thickness: 0 8mm  Ulceration: none   Mitoses: 0  Margins: Approaches both lateral margins and extends to the deep margin      1/17/2022 Initial Diagnosis    Malignant melanoma of upper back ([facility] [address] )         History of Present Illness  :  Patient w/ hc BCC excised presents for consultation regarding a new diagnosis of at least T1b melanoma of the left upper back  Deep margin positive  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  No hx blistering sunburns that he can recall  Sees derm regularly at least qyr for history of BCC  No other lumps or bumps to report  No recent hx fatigue, weight loss, bone pain, confusion  No cervical or axillary abnormalities  Has seen Dr Meaghan Suazo in consult  Review of Systems  Complete ROS Surg Onc:   Constitutional: The patient denies new or recent history of general fatigue, no recent weight loss, no change in appetite  Eyes: No complaints of visual problems, no scleral icterus  ENT: No complaints of ear pain, no hoarseness, no difficulty swallowing,  no tinnitus and no new masses in head, oral cavity, or neck     Cardiovascular: No complaints of chest pain, no palpitations, no ankle edema  Respiratory: No complaints of shortness of breath, no cough  Gastrointestinal: No complaints of jaundice, no bloody stools, no pale stools  Genitourinary: No complaints of dysuria, no hematuria, no nocturia, no frequent urination, no urethral discharge  Musculoskeletal: No complaints of weakness, paralysis, joint stiffness or arthralgias  Integumentary: No complaints of rash, no new lesions  Neurological: No complaints of convulsions, no seizures, no dizziness  Hematologic/Lymphatic: No complaints of easy bruising  Endocrine:  No hot or cold intolerance  No polydipsia, polyphagia, or polyuria  Allergy/immunology:  No environmental allergies  No food allergies  Not immunocompromised        Patient Active Problem List   Diagnosis    HTN (hypertension)    Hypercholesterolemia    Numerous moles    GERD (gastroesophageal reflux disease)    Acute left flank pain    Left lower quadrant pain    Diverticulosis of colon    Suprapubic pain, acute    Dehydration    BMI 28 0-28 9,adult    Diverticulitis of large intestine without perforation or abscess without bleeding    Nephrolithiasis    COVID-19 virus infection    Left hip pain    Malignant melanoma of upper back ([facility] [address] )     Past Medical History:   Diagnosis Date    Allergic rhinitis     Resolved 1/14/2016     Allergy     Mild; spring and fall    Erectile dysfunction of nonorganic origin     Resolved 1/14/2016     GERD (gastroesophageal reflux disease)     Hyperlipidemia     Rosacea     Resolved 1/14/2016      Past Surgical History:   Procedure Laterality Date    FACIAL/NECK BIOPSY Right 1/9/2018    Procedure: CHEEK and LOWER EYELID BCC EXCISION AND COMPLEX CLOSURE;  Surgeon: Mati Cotter MD;  Location: [facility];  Service: Plastics     Family History   Problem Relation Age of Onset    Skin cancer Mother     No Known Problems Father      Social History     Socioeconomic History    Marital status: /Civil [employer]     Spouse name: Not on file    Number of children: Not on file    Years of education: Not on file    Highest education level: Not on file   Occupational History    Not on file   Tobacco Use    Smoking status: Light Tobacco Smoker     Types: Cigars    Smokeless tobacco: Never Used    Tobacco comment: cigar 6 times a year   Vaping Use    Vaping Use: Never used   Substance and Sexual Activity    Alcohol use: Yes     Alcohol/week: 1 0 standard drink     Types: 1 Shots of liquor per week     Comment: occasionally    Drug use: No    Sexual activity: Not on file   Other Topics Concern    Not on file   Social History Narrative    Former smoker - As per [employer]    Never used drugs - As per [employer]      Social Determinants of Health     Financial Resource Strain: Not on file   Food Insecurity: Not on file   Transportation Needs: Not on file   Physical Activity: Not on file   Stress: Not on file   Social Connections: Not on file   Intimate Partner Violence: Not on file   Housing Stability: Not on file       Current Outpatient Medications:     atorvastatin (LIPITOR) 20 mg tablet, TAKE ONE TABLET BY MOUTH EVERY DAY, Disp: 90 tablet, Rfl: 3    losartan (COZAAR) 100 MG tablet, TAKE ONE TABLET BY MOUTH EVERY DAY, Disp: 90 tablet, Rfl: 3    omeprazole (PRILOSEC) 40 MG capsule, Take 40 mg by mouth daily Every other day, Disp: , Rfl:   Allergies   Allergen Reactions    Neomycin-Polymyxin-Dexameth      Annotation - [id number]: skin peeled around eyes    Pollen Extract        Vitals:    01/25/22 1430   BP: 118/76   Pulse: 68   Resp: 16   Temp: 98 °F (36 7 °C)   SpO2: 99%       Physical Exam   General: Appears well, appears stated age  Skin: Warm, anicteric   Left 1 cm lesion shave biopsy site healing well  HEENT: Normocephalic, atraumatic; sclera aniceteric, mucous membranes moist; cervical nodes without adenopathy  Cardiopulmonary: RRR, Easy WOB, no BLE edema  Abd: Flat and soft, nontender, no masses appreciated, no hepatosplenomegaly  MSK: Symmetric, no cyanosis, no overt weakness  Lymphatic: No cervical, axillary or inguinal lymphadenopathy  Neuro: Affect appropriate, no gross motor abnormalities            Pathology:  Revieed in media    Labs: Reviewed in EPIC    Imaging  XR hip/pelv 2-3 vws left if performed    Result Date: 1/12/2022  Narrative: LEFT HIP INDICATION:   M25 552: Pain in left hip  COMPARISON:  None VIEWS:  XR HIP/PELV 2-3 VWS LEFT  W PELVIS IF PERFORMED FINDINGS: There is no acute fracture or dislocation  There is mild bilateral hip osteoarthritis  No lytic or blastic osseous lesion  Soft tissues are unremarkable  Degenerative changes visualized lower lumbar spine  Impression: Mild bilateral hip osteoarthritis and degenerative changes at the lumbosacral junction No acute osseous abnormality Workstation performed: [id number]       I independently reviewed and interpreted the above laboratory and imaging data, including medical oncology consultation, pathology report  I have discussed this pt with Dr Randi Johnson  Discussion/Summary:   78 yo male with new diagnosis of thin T1b melanoma of back  Discussed diagnosis of melanoma and explained that the patient has an low thickness melanoma for which a wide local excision and sentinel lymph node biopsy is recommended  Discussed that any additional therapy will be guided by the final pathology and results of the LN bx  Discussed that sun protection is best prevention for melanoma and discussed ongoing sun protection  Reinforced need for continued skin surveillance with dermatology, which the patient has been very diligent about  Risks, benefits, alternatives and prognosis discussed in full to include bleeding, infection, wound healing complications, need for re-excision, seroma, and pt expresses understanding and endorsement  Will proceed with WLE + SLN bx

## 2022-01-25 NOTE — H&P (VIEW-ONLY)
Surgical Oncology Consultation    [address]  [facility] [address] [facility] [address]    Patient:  Greer Gonzalez  1955  [id number]    Primary Care provider:  MD [address]  [address]    Referring provider:  Love Hood, [address],  [address]    Diagnoses and all orders for this visit:    Malignant melanoma of upper back [facility])    Malignant melanoma of torso excluding breast [facility])  -     Ambulatory Referral to Surgical Oncology        Chief Complaint   Patient presents with    Consult       No follow-ups on file  Oncology History   Malignant melanoma of upper back ([facility] [address] )   12/20/2021 Biopsy    Left upper back Melamona: shave biopsy  Thickness: 0 8mm  Ulceration: none   Mitoses: 0  Margins: Approaches both lateral margins and extends to the deep margin      1/17/2022 Initial Diagnosis    Malignant melanoma of upper back ([facility] [address] )         History of Present Illness  :  Patient w/ hc BCC excised presents for consultation regarding a new diagnosis of at least T1b melanoma of the left upper back  Deep margin positive  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  No hx blistering sunburns that he can recall  Sees derm regularly at least qyr for history of BCC  No other lumps or bumps to report  No recent hx fatigue, weight loss, bone pain, confusion  No cervical or axillary abnormalities  Has seen Dr Randi Johnson in consult  Review of Systems  Complete ROS Surg Onc:   Constitutional: The patient denies new or recent history of general fatigue, no recent weight loss, no change in appetite  Eyes: No complaints of visual problems, no scleral icterus  ENT: No complaints of ear pain, no hoarseness, no difficulty swallowing,  no tinnitus and no new masses in head, oral cavity, or neck     Cardiovascular: No complaints of chest pain, no palpitations, no ankle edema  Respiratory: No complaints of shortness of breath, no cough  Gastrointestinal: No complaints of jaundice, no bloody stools, no pale stools  Genitourinary: No complaints of dysuria, no hematuria, no nocturia, no frequent urination, no urethral discharge  Musculoskeletal: No complaints of weakness, paralysis, joint stiffness or arthralgias  Integumentary: No complaints of rash, no new lesions  Neurological: No complaints of convulsions, no seizures, no dizziness  Hematologic/Lymphatic: No complaints of easy bruising  Endocrine:  No hot or cold intolerance  No polydipsia, polyphagia, or polyuria  Allergy/immunology:  No environmental allergies  No food allergies  Not immunocompromised        Patient Active Problem List   Diagnosis    HTN (hypertension)    Hypercholesterolemia    Numerous moles    GERD (gastroesophageal reflux disease)    Acute left flank pain    Left lower quadrant pain    Diverticulosis of colon    Suprapubic pain, acute    Dehydration    BMI 28 0-28 9,adult    Diverticulitis of large intestine without perforation or abscess without bleeding    Nephrolithiasis    COVID-19 virus infection    Left hip pain    Malignant melanoma of upper back ([facility] [address] )     Past Medical History:   Diagnosis Date    Allergic rhinitis     Resolved 1/14/2016     Allergy     Mild; spring and fall    Erectile dysfunction of nonorganic origin     Resolved 1/14/2016     GERD (gastroesophageal reflux disease)     Hyperlipidemia     Rosacea     Resolved 1/14/2016      Past Surgical History:   Procedure Laterality Date    FACIAL/NECK BIOPSY Right 1/9/2018    Procedure: CHEEK and LOWER EYELID BCC EXCISION AND COMPLEX CLOSURE;  Surgeon: Lorenza Hood MD;  Location: [facility];  Service: Plastics     Family History   Problem Relation Age of Onset    Skin cancer Mother     No Known Problems Father      Social History     Socioeconomic History    Marital status: /Civil [employer]     Spouse name: Not on file    Number of children: Not on file    Years of education: Not on file    Highest education level: Not on file   Occupational History    Not on file   Tobacco Use    Smoking status: Light Tobacco Smoker     Types: Cigars    Smokeless tobacco: Never Used    Tobacco comment: cigar 6 times a year   Vaping Use    Vaping Use: Never used   Substance and Sexual Activity    Alcohol use: Yes     Alcohol/week: 1 0 standard drink     Types: 1 Shots of liquor per week     Comment: occasionally    Drug use: No    Sexual activity: Not on file   Other Topics Concern    Not on file   Social History Narrative    Former smoker - As per [employer]    Never used drugs - As per [employer]      Social Determinants of Health     Financial Resource Strain: Not on file   Food Insecurity: Not on file   Transportation Needs: Not on file   Physical Activity: Not on file   Stress: Not on file   Social Connections: Not on file   Intimate Partner Violence: Not on file   Housing Stability: Not on file       Current Outpatient Medications:     atorvastatin (LIPITOR) 20 mg tablet, TAKE ONE TABLET BY MOUTH EVERY DAY, Disp: 90 tablet, Rfl: 3    losartan (COZAAR) 100 MG tablet, TAKE ONE TABLET BY MOUTH EVERY DAY, Disp: 90 tablet, Rfl: 3    omeprazole (PRILOSEC) 40 MG capsule, Take 40 mg by mouth daily Every other day, Disp: , Rfl:   Allergies   Allergen Reactions    Neomycin-Polymyxin-Dexameth      Annotation - [id number]: skin peeled around eyes    Pollen Extract        Vitals:    01/25/22 1430   BP: 118/76   Pulse: 68   Resp: 16   Temp: 98 °F (36 7 °C)   SpO2: 99%       Physical Exam   General: Appears well, appears stated age  Skin: Warm, anicteric   Left 1 cm lesion shave biopsy site healing well  HEENT: Normocephalic, atraumatic; sclera aniceteric, mucous membranes moist; cervical nodes without adenopathy  Cardiopulmonary: RRR, Easy WOB, no BLE edema  Abd: Flat and soft, nontender, no masses appreciated, no hepatosplenomegaly  MSK: Symmetric, no cyanosis, no overt weakness  Lymphatic: No cervical, axillary or inguinal lymphadenopathy  Neuro: Affect appropriate, no gross motor abnormalities            Pathology:  Revieed in media    Labs: Reviewed in EPIC    Imaging  XR hip/pelv 2-3 vws left if performed    Result Date: 1/12/2022  Narrative: LEFT HIP INDICATION:   M25 552: Pain in left hip  COMPARISON:  None VIEWS:  XR HIP/PELV 2-3 VWS LEFT  W PELVIS IF PERFORMED FINDINGS: There is no acute fracture or dislocation  There is mild bilateral hip osteoarthritis  No lytic or blastic osseous lesion  Soft tissues are unremarkable  Degenerative changes visualized lower lumbar spine  Impression: Mild bilateral hip osteoarthritis and degenerative changes at the lumbosacral junction No acute osseous abnormality Workstation performed: [id number]       I independently reviewed and interpreted the above laboratory and imaging data, including medical oncology consultation, pathology report  I have discussed this pt with Dr Tom Santa  Discussion/Summary:   80 yo male with new diagnosis of thin T1b melanoma of back  Discussed diagnosis of melanoma and explained that the patient has an low thickness melanoma for which a wide local excision and sentinel lymph node biopsy is recommended  Discussed that any additional therapy will be guided by the final pathology and results of the LN bx  Discussed that sun protection is best prevention for melanoma and discussed ongoing sun protection  Reinforced need for continued skin surveillance with dermatology, which the patient has been very diligent about  Risks, benefits, alternatives and prognosis discussed in full to include bleeding, infection, wound healing complications, need for re-excision, seroma, and pt expresses understanding and endorsement  Will proceed with WLE + SLN bx

## 2022-01-25 NOTE — LETTER
January 25, 2022     Honey Tristan, [address]  [address] [place] [address]    Patient: Meka Bacon   YOB: 1955   Date of Visit: 1/25/2022       Dear Dr Jossue Melendez: Thank you for referring Meka Bacon to me for evaluation  Below are my notes for this consultation  If you have questions, please do not hesitate to call me  I look forward to following your patient along with you  Sincerely,        Latesha García MD        CC: MD Latesha Taylor MD  1/25/2022  3:18 PM  Sign when Signing Visit  Surgical Oncology Consultation    [address] [address]    Patient:  Meka Bacon  1955  [id number]    Primary Care provider:  Lexi Joe MD  [facility]  [address] [facility] [address]    Referring provider:  Honey Tristan, [address] [facility] and [address] [facility] [address],  [address] [facility]    Diagnoses and all orders for this visit:    Malignant melanoma of upper back ([facility] [address] )    Malignant melanoma of torso excluding breast [facility])  -     Ambulatory Referral to Surgical Oncology        Chief Complaint   Patient presents with    Consult       No follow-ups on file  Oncology History   Malignant melanoma of upper back ([address] )   12/20/2021 Biopsy    Left upper back Melamona: shave biopsy  Thickness: 0 8mm  Ulceration: none   Mitoses: 0  Margins: Approaches both lateral margins and extends to the deep margin      1/17/2022 Initial Diagnosis    Malignant melanoma of upper back ([address] )         History of Present Illness  :  Patient w/ hc BCC excised presents for consultation regarding a new diagnosis of at least T1b melanoma of the left upper back  Deep margin positive  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  No hx blistering sunburns that he can recall  Sees derm regularly at least qyr for history of BCC   No other lumps or bumps to report  No recent hx fatigue, weight loss, bone pain, confusion  No cervical or axillary abnormalities  Has seen Dr Teresita Faulkner in consult  Review of Systems  Complete ROS Surg Onc:   Constitutional: The patient denies new or recent history of general fatigue, no recent weight loss, no change in appetite  Eyes: No complaints of visual problems, no scleral icterus  ENT: No complaints of ear pain, no hoarseness, no difficulty swallowing,  no tinnitus and no new masses in head, oral cavity, or neck  Cardiovascular: No complaints of chest pain, no palpitations, no ankle edema  Respiratory: No complaints of shortness of breath, no cough  Gastrointestinal: No complaints of jaundice, no bloody stools, no pale stools  Genitourinary: No complaints of dysuria, no hematuria, no nocturia, no frequent urination, no urethral discharge  Musculoskeletal: No complaints of weakness, paralysis, joint stiffness or arthralgias  Integumentary: No complaints of rash, no new lesions  Neurological: No complaints of convulsions, no seizures, no dizziness  Hematologic/Lymphatic: No complaints of easy bruising  Endocrine:  No hot or cold intolerance  No polydipsia, polyphagia, or polyuria  Allergy/immunology:  No environmental allergies  No food allergies  Not immunocompromised        Patient Active Problem List   Diagnosis    HTN (hypertension)    Hypercholesterolemia    Numerous moles    GERD (gastroesophageal reflux disease)    Acute left flank pain    Left lower quadrant pain    Diverticulosis of colon    Suprapubic pain, acute    Dehydration    BMI 28 0-28 9,adult    Diverticulitis of large intestine without perforation or abscess without bleeding    Nephrolithiasis    COVID-19 virus infection    Left hip pain    Malignant melanoma of upper back [facility])     Past Medical History:   Diagnosis Date    Allergic rhinitis     Resolved 1/14/2016     Allergy     Mild; spring and fall    Erectile dysfunction of nonorganic origin     Resolved 1/14/2016     GERD (gastroesophageal reflux disease)     Hyperlipidemia     Rosacea     Resolved 1/14/2016      Past Surgical History:   Procedure Laterality Date    FACIAL/NECK BIOPSY Right 1/9/2018    Procedure: CHEEK and LOWER EYELID BCC EXCISION AND COMPLEX CLOSURE;  Surgeon: Giana Ramirez MD;  [facility]: [facility];  Service: Plastics     Family History   Problem Relation Age of Onset    Skin cancer Mother     No Known Problems Father      Social History     Socioeconomic History    Marital status: /Civil Union     Spouse name: Not on file    Number of children: Not on file    Years of education: Not on file    Highest education level: Not on file   Occupational History    Not on file   Tobacco Use    Smoking status: Light Tobacco Smoker     Types: Cigars    Smokeless tobacco: Never Used    Tobacco comment: cigar 6 times a year   Vaping Use    Vaping Use: Never used   Substance and Sexual Activity    Alcohol use:  Yes     Alcohol/week: 1 0 standard drink     Types: 1 Shots of liquor per week     Comment: occasionally    Drug use: No    Sexual activity: Not on file   Other Topics Concern    Not on file   Social History Narrative    Former smoker - As per [employer]    Never used drugs - As per [employer]      Social Determinants of Health     Financial Resource Strain: Not on file   Food Insecurity: Not on file   Transportation Needs: Not on file   Physical Activity: Not on file   Stress: Not on file   Social Connections: Not on file   Intimate Partner Violence: Not on file   Housing Stability: Not on file       Current Outpatient Medications:     atorvastatin (LIPITOR) 20 mg tablet, TAKE ONE TABLET BY MOUTH EVERY DAY, Disp: 90 tablet, Rfl: 3    losartan (COZAAR) 100 MG tablet, TAKE ONE TABLET BY MOUTH EVERY DAY, Disp: 90 tablet, Rfl: 3    omeprazole (PRILOSEC) 40 MG capsule, Take 40 mg by mouth daily Every other day, Disp: , Rfl: Allergies   Allergen Reactions    Neomycin-Polymyxin-Dexameth      Annotation - [id number]: skin peeled around eyes    Pollen Extract        Vitals:    01/25/22 1430   BP: 118/76   Pulse: 68   Resp: 16   Temp: 98 °F (36 7 °C)   SpO2: 99%       Physical Exam   General: Appears well, appears stated age  Skin: Warm, anicteric  Left 1 cm lesion shave biopsy site healing well  HEENT: Normocephalic, atraumatic; sclera aniceteric, mucous membranes moist; cervical nodes without adenopathy  Cardiopulmonary: RRR, Easy WOB, no BLE edema  Abd: Flat and soft, nontender, no masses appreciated, no hepatosplenomegaly  MSK: Symmetric, no cyanosis, no overt weakness  Lymphatic: No cervical, axillary or inguinal lymphadenopathy  Neuro: Affect appropriate, no gross motor abnormalities            Pathology:  Revieed in media    Labs: Reviewed in EPIC    Imaging  XR hip/pelv 2-3 vws left if performed    Result Date: 1/12/2022  Narrative: LEFT HIP INDICATION:   M25 552: Pain in left hip  COMPARISON:  None VIEWS:  XR HIP/PELV 2-3 VWS LEFT  W PELVIS IF PERFORMED FINDINGS: There is no acute fracture or dislocation  There is mild bilateral hip osteoarthritis  No lytic or blastic osseous lesion  Soft tissues are unremarkable  Degenerative changes visualized lower lumbar spine  Impression: Mild bilateral hip osteoarthritis and degenerative changes at the lumbosacral junction No acute osseous abnormality Workstation performed: [id number]       I independently reviewed and interpreted the above laboratory and imaging data, including medical oncology consultation, pathology report  I have discussed this pt with Dr Karyle Reasoner  Discussion/Summary:   80 yo male with new diagnosis of thin T1b melanoma of back  Discussed diagnosis of melanoma and explained that the patient has an low thickness melanoma for which a wide local excision and sentinel lymph node biopsy is recommended   Discussed that any additional therapy will be guided by the final pathology and results of the LN bx  Discussed that sun protection is best prevention for melanoma and discussed ongoing sun protection  Reinforced need for continued skin surveillance with dermatology, which the patient has been very diligent about  Risks, benefits, alternatives and prognosis discussed in full to include bleeding, infection, wound healing complications, need for re-excision, seroma, and pt expresses understanding and endorsement  Will proceed with WLE + SLN bx

## 2022-01-27 ENCOUNTER — APPOINTMENT (OUTPATIENT)
Dept: RADIOLOGY | Age: 67
End: 2022-01-27
Payer: MEDICARE

## 2022-01-27 ENCOUNTER — APPOINTMENT (OUTPATIENT)
Dept: LAB | Age: 67
End: 2022-01-27
Payer: MEDICARE

## 2022-01-27 DIAGNOSIS — E86.0 DEHYDRATION: ICD-10-CM

## 2022-01-27 DIAGNOSIS — C43.59 MALIGNANT MELANOMA OF UPPER BACK (HCC): ICD-10-CM

## 2022-01-27 DIAGNOSIS — K57.32 DIVERTICULITIS OF LARGE INTESTINE WITHOUT PERFORATION OR ABSCESS WITHOUT BLEEDING: ICD-10-CM

## 2022-01-27 DIAGNOSIS — E78.00 HYPERCHOLESTEROLEMIA: ICD-10-CM

## 2022-01-27 DIAGNOSIS — Z12.5 SCREENING FOR PROSTATE CANCER: ICD-10-CM

## 2022-01-27 LAB
ALBUMIN SERPL BCP-MCNC: 3.8 G/DL (ref 3.5–5)
ALP SERPL-CCNC: 65 U/L (ref 46–116)
ALT SERPL W P-5'-P-CCNC: 31 U/L (ref 12–78)
ANION GAP SERPL CALCULATED.3IONS-SCNC: 3 MMOL/L (ref 4–13)
AST SERPL W P-5'-P-CCNC: 17 U/L (ref 5–45)
BASOPHILS # BLD AUTO: 0.03 THOUSANDS/ΜL (ref 0–0.1)
BASOPHILS NFR BLD AUTO: 1 % (ref 0–1)
BILIRUB SERPL-MCNC: 0.73 MG/DL (ref 0.2–1)
BUN SERPL-MCNC: 22 MG/DL (ref 5–25)
CALCIUM SERPL-MCNC: 9.9 MG/DL (ref 8.3–10.1)
CHLORIDE SERPL-SCNC: 106 MMOL/L (ref 100–108)
CHOLEST SERPL-MCNC: 184 MG/DL
CO2 SERPL-SCNC: 27 MMOL/L (ref 21–32)
CREAT SERPL-MCNC: 1.28 MG/DL (ref 0.6–1.3)
EOSINOPHIL # BLD AUTO: 0.09 THOUSAND/ΜL (ref 0–0.61)
EOSINOPHIL NFR BLD AUTO: 2 % (ref 0–6)
ERYTHROCYTE [DISTWIDTH] IN BLOOD BY AUTOMATED COUNT: 13.1 % (ref 11.6–15.1)
GFR SERPL CREATININE-BSD FRML MDRD: 57 ML/MIN/1.73SQ M
GLUCOSE P FAST SERPL-MCNC: 111 MG/DL (ref 65–99)
HCT VFR BLD AUTO: 44.7 % (ref 36.5–49.3)
HDLC SERPL-MCNC: 51 MG/DL
HGB BLD-MCNC: 15 G/DL (ref 12–17)
IMM GRANULOCYTES # BLD AUTO: 0.02 THOUSAND/UL (ref 0–0.2)
IMM GRANULOCYTES NFR BLD AUTO: 0 % (ref 0–2)
LDH SERPL-CCNC: 186 U/L (ref 81–234)
LDLC SERPL CALC-MCNC: 102 MG/DL (ref 0–100)
LYMPHOCYTES # BLD AUTO: 1.45 THOUSANDS/ΜL (ref 0.6–4.47)
LYMPHOCYTES NFR BLD AUTO: 27 % (ref 14–44)
MCH RBC QN AUTO: 30.4 PG (ref 26.8–34.3)
MCHC RBC AUTO-ENTMCNC: 33.6 G/DL (ref 31.4–37.4)
MCV RBC AUTO: 91 FL (ref 82–98)
MONOCYTES # BLD AUTO: 0.57 THOUSAND/ΜL (ref 0.17–1.22)
MONOCYTES NFR BLD AUTO: 11 % (ref 4–12)
NEUTROPHILS # BLD AUTO: 3.16 THOUSANDS/ΜL (ref 1.85–7.62)
NEUTS SEG NFR BLD AUTO: 59 % (ref 43–75)
NONHDLC SERPL-MCNC: 133 MG/DL
NRBC BLD AUTO-RTO: 0 /100 WBCS
PLATELET # BLD AUTO: 196 THOUSANDS/UL (ref 149–390)
PMV BLD AUTO: 10.7 FL (ref 8.9–12.7)
POTASSIUM SERPL-SCNC: 4.2 MMOL/L (ref 3.5–5.3)
PROT SERPL-MCNC: 7.1 G/DL (ref 6.4–8.2)
PSA SERPL-MCNC: 0.8 NG/ML (ref 0–4)
RBC # BLD AUTO: 4.93 MILLION/UL (ref 3.88–5.62)
SODIUM SERPL-SCNC: 136 MMOL/L (ref 136–145)
TRIGL SERPL-MCNC: 156 MG/DL
WBC # BLD AUTO: 5.32 THOUSAND/UL (ref 4.31–10.16)

## 2022-01-27 PROCEDURE — 83615 LACTATE (LD) (LDH) ENZYME: CPT | Performed by: INTERNAL MEDICINE

## 2022-01-27 PROCEDURE — 36415 COLL VENOUS BLD VENIPUNCTURE: CPT | Performed by: INTERNAL MEDICINE

## 2022-01-27 PROCEDURE — 80053 COMPREHEN METABOLIC PANEL: CPT | Performed by: INTERNAL MEDICINE

## 2022-01-27 PROCEDURE — 80061 LIPID PANEL: CPT

## 2022-01-27 PROCEDURE — G0103 PSA SCREENING: HCPCS

## 2022-01-27 PROCEDURE — 85025 COMPLETE CBC W/AUTO DIFF WBC: CPT | Performed by: INTERNAL MEDICINE

## 2022-01-27 PROCEDURE — 71046 X-RAY EXAM CHEST 2 VIEWS: CPT

## 2022-01-27 NOTE — PROGRESS NOTES
[facility]  [address] [facility] [id number] [phone]     Date of Visit: 1/17/2022  Name: Pawan Mendenhall   YOB: 1955       Subjective    VISIT DIAGNOSIS:  Diagnoses and all orders for this visit:    Malignant melanoma of torso excluding breast ([facility] [address] )  -     CBC and differential  -     Comprehensive metabolic panel  -     LD,Blood  -     Ambulatory Referral to Surgical Oncology; Future        Oncology History   Malignant melanoma of upper back ([facility] [address] )   12/20/2021 Biopsy    Left upper back Melamona: shave biopsy  Thickness: 0 8mm  Ulceration: none   Mitoses: 0  Margins: Approaches both lateral margins and extends to the deep margin      1/17/2022 Initial Diagnosis    Malignant melanoma of upper back [facility])        Cancer Staging  Malignant melanoma of [facility] back [facility])  Staging form: Melanoma of the Skin, AJCC 8th Edition  - Clinical stage from 12/20/2021: Stage IB (cT1b, cN0, cM0) - Signed by Bee Fish MD on 1/27/2022     [No treatment plan]     HISTORY OF PRESENT ILLNESS: Pawan Mendenhall is a 79 y o  male who is seen in consultation at the request of Dr Malachi Grimaldo for a new diagnosis of melanoma of the left upper back  The history is obtained from the patient, review of records, and discussion with our surgeon Dr Keshia Tran   His wife noticed a new mole on the back of his left shoulder in September  He describes it as being the size of a quarter, slightly raised, scaly and bluish pigmented  Denies itching, bleeding, or pain of the lesion  Nothing made it worse or better  He did have routine dermatology exams prior to now     He underwent a biopsy on 12/20/21 of the upper back lesion which demonstrated malignant melanoma negative for ulceration, 0 8 mm Breslow thickness, no mitoses, and + deep margin  He has not had a consult with surgical oncology yet  He is doing well and keeps the biopsy covered and bandaged   He denies any other new, changing, or concerning lesions at this time  He denies a previous history of melanoma  He had BCC/SCC  He describes having a lot of sun exposure when younger  He describes having an average number of sunburns  He describes having no blistering sunburns  He denies tanning bed use  He had blonde hair when he was younger, has green eyes, and his ancestry is [place], and [place]  He has a family history of [address] in his mother  He denies a family history of melanoma, ovarian, breast, or pancreatic cancer  He is up to date on colonoscopy and PSA checks  Denies smoking, occasional alcohol, and no drug use  Rarely smokes a cigar  REVIEW OF SYSTEMS:  Review of Systems   Constitutional: Negative for appetite change, fatigue, fever and unexpected weight change  HENT:   Negative for lump/mass  Eyes: Negative for icterus  Respiratory: Negative for cough, shortness of breath and wheezing  Cardiovascular: Negative for leg swelling  Gastrointestinal: Negative for abdominal pain, constipation, diarrhea, nausea and vomiting  Genitourinary: Negative for difficulty urinating and hematuria  Musculoskeletal: Negative for arthralgias, gait problem and myalgias  Skin: Negative for itching and rash  No new, changing, or concerning lesions  Neurological: Negative for extremity weakness, gait problem, headaches, light-headedness and numbness  Hematological: Negative for adenopathy          MEDICATIONS:    Current Outpatient Medications:     atorvastatin (LIPITOR) 20 mg tablet, TAKE ONE TABLET BY MOUTH EVERY DAY, Disp: 90 tablet, Rfl: 3    losartan (COZAAR) 100 MG tablet, TAKE ONE TABLET BY MOUTH EVERY DAY, Disp: 90 tablet, Rfl: 3    omeprazole (PRILOSEC) 40 MG capsule, Take 40 mg by mouth daily Every other day, Disp: , Rfl:     traMADol (Ultram) 50 mg tablet, Take 1 tablet (50 mg total) by mouth every 6 (six) hours as needed for moderate pain, Disp: 12 tablet, Rfl: 0     ALLERGIES:  Allergies   Allergen Reactions    Neomycin-Polymyxin-Dexameth      Annotation - [id number]: skin peeled around eyes    Pollen Extract         ACTIVE PROBLEMS:  Patient Active Problem List   Diagnosis    HTN (hypertension)    Hypercholesterolemia    Numerous moles    GERD (gastroesophageal reflux disease)    Acute left flank pain    Left lower quadrant pain    Diverticulosis of colon    Suprapubic pain, acute    Dehydration    BMI 28 0-28 9,adult    Diverticulitis of large intestine without perforation or abscess without bleeding    Nephrolithiasis    COVID-19 virus infection    Left hip pain    Malignant melanoma of upper back ([facility] [address] )          PAST MEDICAL HISTORY:   Past Medical History:   Diagnosis Date    Allergic rhinitis     Resolved 1/14/2016     Allergy     Mild; spring and fall    Erectile dysfunction of nonorganic origin     Resolved 1/14/2016     GERD (gastroesophageal reflux disease)     Hyperlipidemia     Rosacea     Resolved 1/14/2016         PAST SURGICAL HISTORY:  Past Surgical History:   Procedure Laterality Date    FACIAL/NECK BIOPSY Right 1/9/2018    Procedure: CHEEK and LOWER EYELID BCC EXCISION AND COMPLEX CLOSURE;  Surgeon: Dorian Najera MD;  Location[facility];  Service: Plastics        SOCIAL HISTORY:  Social History     Socioeconomic History    Marital status: /Civil Union     Spouse name: None    Number of children: None    Years of education: None    Highest education level: None   Occupational History    None   Tobacco Use    Smoking status: Light Tobacco Smoker     Types: Cigars    Smokeless tobacco: Never Used    Tobacco comment: cigar 6 times a year   Vaping Use    Vaping Use: Never used   Substance and Sexual Activity    Alcohol use:  Yes     Alcohol/week: 1 0 standard drink     Types: 1 Shots of liquor per week     Comment: occasionally    Drug use: No    Sexual activity: None   Other Topics Concern    None   Social History Narrative    Former smoker - As per [employer]    Never used drugs - As per [employer]      Social Determinants of Health     Financial Resource Strain: Not on file   Food Insecurity: Not on file   Transportation Needs: Not on file   Physical Activity: Not on file   Stress: Not on file   Social Connections: Not on file   Intimate Partner Violence: Not on file   Housing Stability: Not on file        FAMILY HISTORY:  Family History   Problem Relation Age of Onset    Skin cancer Mother     No Known Problems Father           Objective    PHYSICAL EXAMINATION:   Blood pressure 142/80, pulse 100, temperature 98 °F (36 7 °C), resp  rate 18, height 6' (1 829 m), weight 95 3 kg (210 lb), SpO2 96 %  ECOG Performance Status      Most Recent Value   ECOG Performance Status 0 - Fully active, able to carry on all pre-disease performance without restriction             Physical Exam  Constitutional:       General: He is not in acute distress  Appearance: Normal appearance  He is not toxic-appearing  HENT:      Mouth/Throat:      Mouth: Mucous membranes are moist       Pharynx: Oropharynx is clear  Eyes:      General: No scleral icterus  Cardiovascular:      Rate and Rhythm: Normal rate and regular rhythm  Pulses: Normal pulses  Heart sounds: No murmur heard  No friction rub  No gallop  Pulmonary:      Effort: Pulmonary effort is normal  No respiratory distress  Breath sounds: Normal breath sounds  No wheezing or rales  Chest:   Breasts:      Right: No axillary adenopathy or supraclavicular adenopathy  Left: No axillary adenopathy or supraclavicular adenopathy  Abdominal:      General: There is no distension  Palpations: There is no mass  Tenderness: There is no abdominal tenderness  There is no rebound  Musculoskeletal:         General: No swelling or tenderness  Right lower leg: No edema  Left lower leg: No edema  Lymphadenopathy:      Head:      Right side of head: No submandibular, preauricular or posterior auricular adenopathy  Left side of head: No submandibular, preauricular or posterior auricular adenopathy  Cervical: No cervical adenopathy  Right cervical: No superficial or posterior cervical adenopathy  Left cervical: No superficial or posterior cervical adenopathy  Upper Body:      Right upper body: No supraclavicular or axillary adenopathy  Left upper body: No supraclavicular or axillary adenopathy  Lower Body: No right inguinal adenopathy  No left inguinal adenopathy  Skin:     Findings: No rash  Comments: Healing biopsy site  No evidence of recurrence at primary site  Neurological:      General: No focal deficit present  Mental Status: He is alert and oriented to person, place, and time  Psychiatric:         Mood and Affect: Mood normal          Behavior: Behavior normal          Thought Content: Thought content normal          Judgment: Judgment normal          I reviewed lab data in the chart    Results for Joellen Lopez (MRN [id number]) as of 1/27/2022 17:12   Ref   Range 11/4/2021 16:47   Sodium Latest Ref Range: 136 - 145 mmol/L 139   Potassium Latest Ref Range: 3 5 - 5 3 mmol/L 3 8   Chloride Latest Ref Range: 100 - 108 mmol/L 100   CO2 Latest Ref Range: 21 - 32 mmol/L 27   Anion Gap Latest Ref Range: 4 - 13 mmol/L 12   BUN Latest Ref Range: 5 - 25 mg/dL 16   Creatinine Latest Ref Range: 0 60 - 1 30 mg/dL 1 54 (H)   Glucose, Random Latest Ref Range: 65 - 140 mg/dL 94   Calcium Latest Ref Range: 8 3 - 10 1 mg/dL 9 1   AST Latest Ref Range: 5 - 45 U/L 16   ALT Latest Ref Range: 12 - 78 U/L 32   Alkaline Phosphatase Latest Ref Range: 46 - 116 U/L 71   Total Protein Latest Ref Range: 6 4 - 8 2 g/dL 7 5   Albumin Latest Ref Range: 3 5 - 5 0 g/dL 3 9   TOTAL BILIRUBIN Latest Ref Range: 0 20 - 1 00 mg/dL 1 28 (H)   eGFR Latest Units: ml/min/1 73sq m 46   WBC Latest Ref Range: 4 31 - 10 16 Thousand/uL 10 61 (H)   Red Blood Cell Count Latest Ref Range: 3 88 - 5 62 Million/uL 5 07   Hemoglobin Latest Ref Range: 12 0 - 17 0 g/dL 15 1   HCT Latest Ref Range: 36 5 - 49 3 % 47 5   MCV Latest Ref Range: 82 - 98 fL 94   MCH Latest Ref Range: 26 8 - 34 3 pg 29 8   MCHC Latest Ref Range: 31 4 - 37 4 g/dL 31 8   RDW Latest Ref Range: 11 6 - 15 1 % 12 5   Platelet Count Latest Ref Range: 149 - 390 Thousands/uL 230   MPV Latest Ref Range: 8 9 - 12 7 fL 10 0   nRBC Latest Units: /100 WBCs 0   Neutrophils % Latest Ref Range: 43 - 75 % 69   Immat GRANS % Latest Ref Range: 0 - 2 % 0   Lymphocytes Relative Latest Ref Range: 14 - 44 % 19   Monocytes Relative Latest Ref Range: 4 - 12 % 11   Eosinophils Latest Ref Range: 0 - 6 % 1   Basophils Relative Latest Ref Range: 0 - 1 % 0   Immature Grans Absolute Latest Ref Range: 0 00 - 0 20 Thousand/uL 0 03   Absolute Neutrophils Latest Ref Range: 1 85 - 7 62 Thousands/µL 7 27   Lymphocytes Absolute Latest Ref Range: 0 60 - 4 47 Thousands/µL 2 03   Absolute Monocytes Latest Ref Range: 0 17 - 1 22 Thousand/µL 1 18   Absolute Eosinophils Latest Ref Range: 0 00 - 0 61 Thousand/µL 0 06   Basophils Absolute Latest Ref Range: 0 00 - 0 10 Thousands/µL 0 04   TSH 3RD GENERATON Latest Ref Range: 0 358 - 3 740 uIU/mL 1 908       No results found for: TSH  No results found for: Y6BLYIR   No results found for: FREET4      RECENT IMAGING:  Procedure: XR hip/pelv 2-3 vws left if performed    Result Date: 1/12/2022  Narrative: LEFT HIP INDICATION:   M25 552: Pain in left hip  COMPARISON:  None VIEWS:  XR HIP/PELV 2-3 VWS LEFT  W PELVIS IF PERFORMED FINDINGS: There is no acute fracture or dislocation  There is mild bilateral hip osteoarthritis  No lytic or blastic osseous lesion  Soft tissues are unremarkable  Degenerative changes visualized lower lumbar spine       Impression: Mild bilateral hip osteoarthritis and degenerative changes at the lumbosacral junction No acute osseous abnormality Workstation performed: [id number]            Assessment/Plan  Mr Niecy Reyes is a 79 yr male with newly diagnosed melanoma, as least Stage IB ([id number]), here for disease management discussion    1  Malignant melanoma of torso excluding breast ([address] )  I had an extensive discussion with the patient and his spouse regarding his  diagnosis, prognosis, and recommendations for further management  We reviewed his melanoma history, current findings, pathology and imaging tests  We discussed that he is at least a Stage IB based on the pathological features of his melanoma  We discussed that he will need a WLE and SLNbx for definitve treatment for his melanoma as well as staging  We discussed that all recommendations will be based on final pathology and staging  We ordered baseline labs, including LDH, to be performed  Scripts provided  We also placed referral to surgical oncology for the WLE and SLNbx  In general, we discussed that he will require ongoing monitoring and surveillance, both for disease recurrence as well as a new primary melanoma as well as other nonmelanoma skin cancers  This includes physical exam and labs, including a comprehensive metabolic panel, CBC with differential and LDH  We discussed that he should also have close dermatology follow up  We discussed the importance of regular cutaneous self examinations and reviewed the features of lesions that could be concerning for a primary melanoma  I did explain that he  is at risk for developing another primary melanoma as well  We also discussed avoidance of unnecessary sun exposure and use of sun protective clothing and sunscreen  I also recommended routine follow up and skin checks with dermatology      Family Screening: his  first-degree relatives, namely his siblings, parents, and children, have an increased risk of developing a melanoma over the general population given his  diagnosis of melanoma, and should have annual dermatologic screening  He will return two weeks after surgery for follow up  - CBC and differential  - Comprehensive metabolic panel  - LD,Blood  - Ambulatory Referral to Surgical Oncology; Future    Mr  Viri Martin had all his questions and concerns addressed and he knows to call with any additional issues  Thank you for allowing me to participate in Mr Quentin Rowe ana Noel MD, PhD

## 2022-01-28 ENCOUNTER — OFFICE VISIT (OUTPATIENT)
Dept: OBGYN CLINIC | Facility: MEDICAL CENTER | Age: 67
End: 2022-01-28
Payer: MEDICARE

## 2022-01-28 VITALS
DIASTOLIC BLOOD PRESSURE: 81 MMHG | BODY MASS INDEX: 28.85 KG/M2 | WEIGHT: 213 LBS | HEIGHT: 72 IN | HEART RATE: 90 BPM | RESPIRATION RATE: 18 BRPM | SYSTOLIC BLOOD PRESSURE: 126 MMHG

## 2022-01-28 DIAGNOSIS — M25.552 LEFT HIP PAIN: ICD-10-CM

## 2022-01-28 DIAGNOSIS — M16.12 PRIMARY OSTEOARTHRITIS OF ONE HIP, LEFT: Primary | ICD-10-CM

## 2022-01-28 PROCEDURE — 99203 OFFICE O/P NEW LOW 30 MIN: CPT | Performed by: ORTHOPAEDIC SURGERY

## 2022-01-28 NOTE — PROGRESS NOTES
Orthopaedics Office Visit - New Patient Visit    ASSESSMENT/PLAN:    Assessment:   78 yo male chronic left hip pain mild for years started to worsen acutely 3 months ago, plain films consistent with mild to moderate left hip OA  He has not tried any treatment for this problem yet  Plan:   Left hip steroid injection for acute left hip OA exacerbation  Nsaids  Physical therapy  Follow up in 3 months, sooner if left hip pain doesn't improve or worsens    To Do Next Visit:  Recheck    _____________________________________________________  CHIEF COMPLAINT:  Chief Complaint   Patient presents with    Left Hip - Pain         SUBJECTIVE:  Mel Burrows is a 79 y o  male who presents with chronic years long left hip pain  He said he notes that he has had a mild left hip pain for years but since November 2021, his hip pain has acutely worsened, He had no acute injury to the left hip no inciting event  The pain is located in left groin worse with prolonged seating, going up stairs, weight bearing  Better with rest, nsaids, and aspercream  He reports that intensity varies between 5 and 8 / 10, feels like an aching pain and is intermittent not constant  There is no radiation  Not associate with numbness tingling weakness fevers chills nausea vomiting  He has a history of melanoma and is having an excision of a lesion on his back in near future  No prior surgical history takes no blood thinners  SOCIAL HISTORY:  Social History     Tobacco Use    Smoking status: Light Tobacco Smoker     Types: Cigars    Smokeless tobacco: Never Used    Tobacco comment: cigar 6 times a year   Vaping Use    Vaping Use: Never used   Substance Use Topics    Alcohol use:  Yes     Alcohol/week: 1 0 standard drink     Types: 1 Shots of liquor per week     Comment: occasionally    Drug use: No       MEDICATIONS:    Current Outpatient Medications:     atorvastatin (LIPITOR) 20 mg tablet, TAKE ONE TABLET BY MOUTH EVERY DAY, Disp: 90 tablet, Rfl: 3    losartan (COZAAR) 100 MG tablet, TAKE ONE TABLET BY MOUTH EVERY DAY, Disp: 90 tablet, Rfl: 3    omeprazole (PRILOSEC) 40 MG capsule, Take 40 mg by mouth daily Every other day, Disp: , Rfl:     traMADol (Ultram) 50 mg tablet, Take 1 tablet (50 mg total) by mouth every 6 (six) hours as needed for moderate pain, Disp: 12 tablet, Rfl: 0    REVIEW OF SYSTEMS:  MSK: n/a  Neuro: n/a  Pertinent items are otherwise noted in HPI  A comprehensive review of systems was otherwise negative     _____________________________________________________  PHYSICAL EXAMINATION:  Vital signs: Resp 18   Ht 6' (1 829 m)   Wt 96 6 kg (213 lb)   BMI 28 89 kg/m²   General: No acute distress, awake and alert  Psychiatric: Mood and affect appear appropriate  HEENT: Trachea Midline, No torticollis, no apparent facial trauma  Cardiovascular: No audible murmurs; Extremities appear perfused  Pulmonary: No audible wheezing or stridor  Skin: No open lesions; see further details (if any) below    MUSCULOSKELETAL EXAMINATION:  Extremities:  Left lower  No ttp left hip region  Mild pain with passive flexion of the left hip to 90 degrees and internal external rotation  There is mild restriction to left hip joint ROM both active and passive especially with internal rotation  Sensation intact dp/sp/tib/larry/saph  Motor intact hip flex ex, knee flex ex, ankle pf/df, ehl/fhl  Foot warm, 2+ DP pulse      _____________________________________________________  STUDIES REVIEWED:  I personally reviewed the images and interpretation is as follows:    Xray AP and lateral left hip shows mild bilateral hip arthritis as evidenced by joint space narrowing and mild subchondral sclerosis  PROCEDURES PERFORMED:  None    Loree Dickson MD  I erroneously took off the ship this note  Until this point was prepared by the orthopedic resident  My attestation is as follows  Patient was seen and examined today  Case was reviewed with the resident physician today    I agree with the history, exam, assessment, plan as documented by the resident physician  51-year-old male describes atraumatic onset of pain in left groin that is worse ascending and descending stairs, and getting in and out of his car  He has no problems putting on shoe wear, he describes no back pain, he describes no paresthesia down the left lower extremity  Examination confirms limitation of spinal flexibility  The left hip has intact soft tissue envelope  There is no tenderness the patient the trochanteric flare  There is some external rotation associated with forward flexion of the hip to 90°  There is reduction of forced internal rotation as this recreates pain in the left groin  Left thighs devoid of atrophy  Left knee is not effused  The foot and toes strongly dorsiflex  I have personally reviewed x-rays of both hips my interpretation is as follows:  Milder modest arthritic changes seen within the left hip for greater than right side  Assessment:  Adult male with osteoarthritis left hip with some pain and dysfunction as symptoms  All diagnoses were discussed with the patient  Treatment options including risks, benefits, alternatives discussed in detail  Injection of corticosteroid is ordered with image assistance, and a program of physical therapy but return of strength the left hip was indicated    This being completed, office follow-up on an as-needed basis is my recommendation

## 2022-01-31 NOTE — NURSING NOTE
Attempted to contact patient to discuss upcoming fluoro guided left hip corticosteroid injection at [address] [place] [address] Radiology  Message left

## 2022-01-31 NOTE — NURSING NOTE
Received a call back from patient to discuss upcoming fluoro guided left hip corticosteroid injection at [facility]  Allergies reviewed and verified patient does not currently take any anticoagulant medications  Pre procedure instructions including diet and taking own medications discussed with patient  Patient instructed that he may eat normally and take medications as usual before the procedure  Procedure and post procedure expectations and instructions reviewed with the patient  Patient verbalizes understanding and denies any questions at this time  Reminded of the location, date and time of the expected procedure  Encouraged to call back with any further questions

## 2022-02-07 ENCOUNTER — HOSPITAL ENCOUNTER (OUTPATIENT)
Dept: RADIOLOGY | Facility: HOSPITAL | Age: 67
Discharge: HOME | End: 2022-02-07
Admitting: RADIOLOGY
Payer: MEDICARE

## 2022-02-07 DIAGNOSIS — M25.552 LEFT HIP PAIN: ICD-10-CM

## 2022-02-07 PROCEDURE — 77002 NEEDLE LOCALIZATION BY XRAY: CPT

## 2022-02-07 PROCEDURE — 20610 DRAIN/INJ JOINT/BURSA W/O US: CPT

## 2022-02-07 RX ORDER — LIDOCAINE HYDROCHLORIDE 10 MG/ML
7 INJECTION, SOLUTION EPIDURAL; INFILTRATION; INTRACAUDAL; PERINEURAL ONCE
Status: COMPLETED | OUTPATIENT
Start: 2022-02-07 | End: 2022-02-07

## 2022-02-07 RX ORDER — METHYLPREDNISOLONE ACETATE 80 MG/ML
80 INJECTION, SUSPENSION INTRA-ARTICULAR; INTRALESIONAL; INTRAMUSCULAR; SOFT TISSUE ONCE
Status: COMPLETED | OUTPATIENT
Start: 2022-02-07 | End: 2022-02-07

## 2022-02-07 RX ORDER — BUPIVACAINE HYDROCHLORIDE 2.5 MG/ML
10 INJECTION, SOLUTION EPIDURAL; INFILTRATION; INTRACAUDAL ONCE
Status: COMPLETED | OUTPATIENT
Start: 2022-02-07 | End: 2022-02-07

## 2022-02-07 RX ADMIN — METHYLPREDNISOLONE ACETATE 80 MG: 80 INJECTION, SUSPENSION INTRA-ARTICULAR; INTRALESIONAL; INTRAMUSCULAR; SOFT TISSUE at 11:15

## 2022-02-07 RX ADMIN — BUPIVACAINE HYDROCHLORIDE 2 ML: 2.5 INJECTION, SOLUTION EPIDURAL; INFILTRATION; INTRACAUDAL; PERINEURAL at 11:15

## 2022-02-07 RX ADMIN — IOHEXOL 1 ML: 300 INJECTION, SOLUTION INTRAVENOUS at 11:10

## 2022-02-07 RX ADMIN — LIDOCAINE HYDROCHLORIDE 7 ML: 10 INJECTION, SOLUTION EPIDURAL; INFILTRATION; INTRACAUDAL at 11:15

## 2022-02-07 NOTE — PRE-PROCEDURE INSTRUCTIONS
Pre-Surgery Instructions:   Medication Instructions    atorvastatin (LIPITOR) 20 mg tablet Instructed to take per normal schedule including DOS with sips water    losartan (COZAAR) 100 MG tablet Instructed to take per normal schedule except DOS    omeprazole (PRILOSEC) 40 MG capsule Instructed to take per normal schedule including DOS with sips water    Pre op,medications and showering instructions reviewed-Patient has hibiclens  Instructed to avoid all Vit/Supp 1 week prior to surgery and to avoid NSAIDs 3 days prior to surgery  Pt  Verbalized understanding of current visitor restrictions  Pt  Verbalized an understanding of all instructions reviewed and offers no concerns at this time

## 2022-02-15 ENCOUNTER — HOSPITAL ENCOUNTER (OUTPATIENT)
Dept: NUCLEAR MEDICINE | Facility: HOSPITAL | Age: 67
Discharge: HOME | End: 2022-02-15
Attending: STUDENT IN AN ORGANIZED HEALTH CARE EDUCATION/TRAINING PROGRAM
Payer: MEDICARE

## 2022-02-15 ENCOUNTER — HOSPITAL ENCOUNTER (OUTPATIENT)
Facility: HOSPITAL | Age: 67
Setting detail: OUTPATIENT SURGERY
Discharge: HOME | End: 2022-02-15
Attending: STUDENT IN AN ORGANIZED HEALTH CARE EDUCATION/TRAINING PROGRAM | Admitting: STUDENT IN AN ORGANIZED HEALTH CARE EDUCATION/TRAINING PROGRAM
Payer: MEDICARE

## 2022-02-15 ENCOUNTER — ANESTHESIA EVENT (OUTPATIENT)
Dept: PERIOP | Facility: HOSPITAL | Age: 67
End: 2022-02-15
Payer: MEDICARE

## 2022-02-15 ENCOUNTER — ANESTHESIA (OUTPATIENT)
Dept: PERIOP | Facility: HOSPITAL | Age: 67
End: 2022-02-15
Payer: MEDICARE

## 2022-02-15 VITALS
SYSTOLIC BLOOD PRESSURE: 130 MMHG | OXYGEN SATURATION: 95 % | HEIGHT: 72 IN | TEMPERATURE: 97.1 F | RESPIRATION RATE: 18 BRPM | WEIGHT: 210.54 LBS | BODY MASS INDEX: 28.52 KG/M2 | DIASTOLIC BLOOD PRESSURE: 70 MMHG | HEART RATE: 80 BPM

## 2022-02-15 DIAGNOSIS — C43.59 MALIGNANT MELANOMA OF UPPER BACK (HCC): ICD-10-CM

## 2022-02-15 PROCEDURE — 11606 EXC TR-EXT MAL+MARG >4 CM: CPT | Performed by: STUDENT IN AN ORGANIZED HEALTH CARE EDUCATION/TRAINING PROGRAM

## 2022-02-15 PROCEDURE — 88344 IMHCHEM/IMCYTCHM EA MLT ANTB: CPT | Performed by: PATHOLOGY

## 2022-02-15 PROCEDURE — 88342 IMHCHEM/IMCYTCHM 1ST ANTB: CPT | Performed by: PATHOLOGY

## 2022-02-15 PROCEDURE — 88307 TISSUE EXAM BY PATHOLOGIST: CPT | Performed by: PATHOLOGY

## 2022-02-15 PROCEDURE — G1004 CDSM NDSC: HCPCS

## 2022-02-15 PROCEDURE — 88305 TISSUE EXAM BY PATHOLOGIST: CPT | Performed by: PATHOLOGY

## 2022-02-15 PROCEDURE — 88341 IMHCHEM/IMCYTCHM EA ADD ANTB: CPT | Performed by: PATHOLOGY

## 2022-02-15 PROCEDURE — A9541 TC99M SULFUR COLLOID: HCPCS

## 2022-02-15 PROCEDURE — 38510 BIOPSY/REMOVAL LYMPH NODES: CPT | Performed by: STUDENT IN AN ORGANIZED HEALTH CARE EDUCATION/TRAINING PROGRAM

## 2022-02-15 PROCEDURE — 78195 LYMPH SYSTEM IMAGING: CPT

## 2022-02-15 PROCEDURE — 12032 INTMD RPR S/A/T/EXT 2.6-7.5: CPT | Performed by: STUDENT IN AN ORGANIZED HEALTH CARE EDUCATION/TRAINING PROGRAM

## 2022-02-15 RX ORDER — MAGNESIUM HYDROXIDE 1200 MG/15ML
LIQUID ORAL AS NEEDED
Status: DISCONTINUED | OUTPATIENT
Start: 2022-02-15 | End: 2022-02-15 | Stop reason: HOSPADM

## 2022-02-15 RX ORDER — CEFAZOLIN SODIUM 2 G/50ML
SOLUTION INTRAVENOUS AS NEEDED
Status: DISCONTINUED | OUTPATIENT
Start: 2022-02-15 | End: 2022-02-15

## 2022-02-15 RX ORDER — SODIUM CHLORIDE, SODIUM LACTATE, POTASSIUM CHLORIDE, CALCIUM CHLORIDE 600; 310; 30; 20 MG/100ML; MG/100ML; MG/100ML; MG/100ML
INJECTION, SOLUTION INTRAVENOUS CONTINUOUS PRN
Status: DISCONTINUED | OUTPATIENT
Start: 2022-02-15 | End: 2022-02-15

## 2022-02-15 RX ORDER — ONDANSETRON 2 MG/ML
4 INJECTION INTRAMUSCULAR; INTRAVENOUS ONCE AS NEEDED
Status: DISCONTINUED | OUTPATIENT
Start: 2022-02-15 | End: 2022-02-15 | Stop reason: HOSPADM

## 2022-02-15 RX ORDER — FENTANYL CITRATE/PF 50 MCG/ML
50 SYRINGE (ML) INJECTION
Status: DISCONTINUED | OUTPATIENT
Start: 2022-02-15 | End: 2022-02-15 | Stop reason: HOSPADM

## 2022-02-15 RX ORDER — CEFAZOLIN SODIUM 2 G/50ML
2000 SOLUTION INTRAVENOUS ONCE
Status: DISCONTINUED | OUTPATIENT
Start: 2022-02-15 | End: 2022-02-15 | Stop reason: HOSPADM

## 2022-02-15 RX ORDER — SUCCINYLCHOLINE/SOD CL,ISO/PF 100 MG/5ML
SYRINGE (ML) INTRAVENOUS AS NEEDED
Status: DISCONTINUED | OUTPATIENT
Start: 2022-02-15 | End: 2022-02-15

## 2022-02-15 RX ORDER — FENTANYL CITRATE 50 UG/ML
INJECTION, SOLUTION INTRAMUSCULAR; INTRAVENOUS AS NEEDED
Status: DISCONTINUED | OUTPATIENT
Start: 2022-02-15 | End: 2022-02-15

## 2022-02-15 RX ORDER — HYDROMORPHONE HCL/PF 1 MG/ML
0.5 SYRINGE (ML) INJECTION
Status: DISCONTINUED | OUTPATIENT
Start: 2022-02-15 | End: 2022-02-15 | Stop reason: HOSPADM

## 2022-02-15 RX ORDER — PROPOFOL 10 MG/ML
INJECTION, EMULSION INTRAVENOUS AS NEEDED
Status: DISCONTINUED | OUTPATIENT
Start: 2022-02-15 | End: 2022-02-15

## 2022-02-15 RX ORDER — MIDAZOLAM HYDROCHLORIDE 2 MG/2ML
INJECTION, SOLUTION INTRAMUSCULAR; INTRAVENOUS AS NEEDED
Status: DISCONTINUED | OUTPATIENT
Start: 2022-02-15 | End: 2022-02-15

## 2022-02-15 RX ORDER — ONDANSETRON 2 MG/ML
INJECTION INTRAMUSCULAR; INTRAVENOUS AS NEEDED
Status: DISCONTINUED | OUTPATIENT
Start: 2022-02-15 | End: 2022-02-15

## 2022-02-15 RX ORDER — DEXAMETHASONE SODIUM PHOSPHATE 10 MG/ML
INJECTION, SOLUTION INTRAMUSCULAR; INTRAVENOUS AS NEEDED
Status: DISCONTINUED | OUTPATIENT
Start: 2022-02-15 | End: 2022-02-15

## 2022-02-15 RX ORDER — HYDROMORPHONE HCL/PF 1 MG/ML
SYRINGE (ML) INJECTION AS NEEDED
Status: DISCONTINUED | OUTPATIENT
Start: 2022-02-15 | End: 2022-02-15

## 2022-02-15 RX ORDER — SODIUM CHLORIDE, SODIUM LACTATE, POTASSIUM CHLORIDE, CALCIUM CHLORIDE 600; 310; 30; 20 MG/100ML; MG/100ML; MG/100ML; MG/100ML
75 INJECTION, SOLUTION INTRAVENOUS CONTINUOUS
Status: DISCONTINUED | OUTPATIENT
Start: 2022-02-15 | End: 2022-02-15 | Stop reason: HOSPADM

## 2022-02-15 RX ADMIN — DEXAMETHASONE SODIUM PHOSPHATE 10 MG: 10 INJECTION, SOLUTION INTRAMUSCULAR; INTRAVENOUS at 15:05

## 2022-02-15 RX ADMIN — ONDANSETRON 4 MG: 2 INJECTION INTRAMUSCULAR; INTRAVENOUS at 16:41

## 2022-02-15 RX ADMIN — Medication 100 MG: at 14:59

## 2022-02-15 RX ADMIN — PROPOFOL 200 MG: 10 INJECTION, EMULSION INTRAVENOUS at 14:59

## 2022-02-15 RX ADMIN — HYDROMORPHONE HYDROCHLORIDE 0.5 MG: 1 INJECTION, SOLUTION INTRAMUSCULAR; INTRAVENOUS; SUBCUTANEOUS at 16:45

## 2022-02-15 RX ADMIN — HYDROMORPHONE HYDROCHLORIDE 0.5 MG: 1 INJECTION, SOLUTION INTRAMUSCULAR; INTRAVENOUS; SUBCUTANEOUS at 15:12

## 2022-02-15 RX ADMIN — MIDAZOLAM HYDROCHLORIDE 2 MG: 1 INJECTION, SOLUTION INTRAMUSCULAR; INTRAVENOUS at 14:54

## 2022-02-15 RX ADMIN — CEFAZOLIN SODIUM 2000 MG: 2 SOLUTION INTRAVENOUS at 15:01

## 2022-02-15 RX ADMIN — PROPOFOL 200 MG: 10 INJECTION, EMULSION INTRAVENOUS at 17:03

## 2022-02-15 RX ADMIN — FENTANYL CITRATE 50 MCG: 50 INJECTION, SOLUTION INTRAMUSCULAR; INTRAVENOUS at 15:04

## 2022-02-15 RX ADMIN — FENTANYL CITRATE 50 MCG: 50 INJECTION, SOLUTION INTRAMUSCULAR; INTRAVENOUS at 14:59

## 2022-02-15 RX ADMIN — SODIUM CHLORIDE, SODIUM LACTATE, POTASSIUM CHLORIDE, AND CALCIUM CHLORIDE: .6; .31; .03; .02 INJECTION, SOLUTION INTRAVENOUS at 14:54

## 2022-02-15 RX ADMIN — HYDROMORPHONE HYDROCHLORIDE 1 MG: 1 INJECTION, SOLUTION INTRAMUSCULAR; INTRAVENOUS; SUBCUTANEOUS at 17:04

## 2022-02-15 NOTE — DISCHARGE INSTRUCTIONS
Please follow-up as scheduled      Activity:  - No lifting greater than 20 pounds or strenuous physical activity or exercise for 2 weeks  - Walking and normal light activities are encouraged  - Normal daily activities including climbing steps are okay  - No driving until no longer using pain medications      Diet:    - You may resume your normal diet      Wound Care:  - May shower daily  No tub baths or swimming until cleared by your surgeon   - Wash incision gently with soap and water and pat dry  - Do not apply any creams or ointments unless instructed to do so by your surgeon   - Kianna Barrientos may apply ice as needed (no longer than 20 minutes at a time) for the first 48 hours  - Bruising is not unusual and will go away with a little time  You may apply a warm, moist compress that may help the bruising resolve quicker  - You may remove the dressings the day after surgery (unless otherwise instructed)  Leave any skin tapes (steri-strips) on the incision(s) in place until they fall off on their own  Any new dressings are optional      Medications:    - You may resume all of your regular medications, including blood thinners and aspirin, after going home unless otherwise instructed  Please refer to your discharge medication list for further details  - Please take the pain medications as directed  - You are encouraged to use non-narcotic pain medications first and whenever possible  Reserve the use of narcotic pain medication for moderate to severe pain not controlled by non-narcotic medications   - No driving while taking narcotic pain medications  - You may become constipated, especially if taking pain medications  You may take any over the counter stool softeners or laxatives as needed  Examples: Milk of Magnesia, Colace, Senna      Additional Instructions:  - If you have any questions or concerns after discharge please call the office   - Call office or return to ER if fever greater than 101, chills, persistent nausea/vomiting, worsening/uncontrollable pain, and/or increasing redness or purulent/foul smelling drainage from incision(s)

## 2022-02-15 NOTE — INTERVAL H&P NOTE
H&P reviewed  After examining the patient I find no changes in the patients condition since the H&P had been written      Vitals:    02/15/22 1213   BP: (!) 142/101   Pulse: 103   Resp: 18   Temp: (!) 97 °F (36 1 °C)   SpO2: 98%

## 2022-02-15 NOTE — ANESTHESIA PREPROCEDURE EVALUATION
Procedure:  UPPER BACK LESION WIDE EXCISION (Left Back)  BIOPSY LYMPH NODE SENTINEL (NUC MED INJECTION AT 1300) (Left Axilla)    Relevant Problems   CARDIO   (+) HTN (hypertension)   (+) Hypercholesterolemia      GI/HEPATIC   (+) GERD (gastroesophageal reflux disease)      /RENAL   (+) Nephrolithiasis   melanoma     Physical Exam    Airway    Mallampati score: II  TM Distance: >3 FB  Neck ROM: full     Dental   No notable dental hx     Cardiovascular      Pulmonary      Other Findings  Due to overall universal COVID-19 precautions and pandemic, I did not auscultate heart, lungs and abdomen due to the low yield in an asymptomatic patient  I preferred not to introduce a stethoscope during my examination due to the potential of spread of COVID-19 from patient to patient  Results for Eloina Mae (MRN [id number]) as of 2/15/2022 13:18   Ref  Range 1/27/2022 07:32   Sodium Latest Ref Range: 136 - 145 mmol/L 136   Potassium Latest Ref Range: 3 5 - 5 3 mmol/L 4 2   Chloride Latest Ref Range: 100 - 108 mmol/L 106   CO2 Latest Ref Range: 21 - 32 mmol/L 27   Anion Gap Latest Ref Range: 4 - 13 mmol/L 3 (L)   BUN Latest Ref Range: 5 - 25 mg/dL 22   Creatinine Latest Ref Range: 0 60 - 1 30 mg/dL 1 28   GLUCOSE FASTING Latest Ref Range: 65 - 99 mg/dL 111 (H)   Calcium Latest Ref Range: 8 3 - 10 1 mg/dL 9 9   AST Latest Ref Range: 5 - 45 U/L 17   ALT Latest Ref Range: 12 - 78 U/L 31   Alkaline Phosphatase Latest Ref Range: 46 - 116 U/L 65   Total Protein Latest Ref Range: 6 4 - 8 2 g/dL 7 1   Albumin Latest Ref Range: 3 5 - 5 0 g/dL 3 8   TOTAL BILIRUBIN Latest Ref Range: 0 20 - 1 00 mg/dL 0 73   eGFR Latest Units: ml/min/1 73sq m 57       Results for Eloina Mae (MRN [id number]) as of 2/15/2022 13:18   Ref   Range 1/27/2022 07:32   WBC Latest Ref Range: 4 31 - 10 16 Thousand/uL 5 32   Red Blood Cell Count Latest Ref Range: 3 88 - 5 62 Million/uL 4 93   Hemoglobin Latest Ref Range: 12 0 - 17 0 g/dL 15 0   HCT Latest Ref Range: 36 5 - 49 3 % 44 7   MCV Latest Ref Range: 82 - 98 fL 91   MCH Latest Ref Range: 26 8 - 34 3 pg 30 4   MCHC Latest Ref Range: 31 4 - 37 4 g/dL 33 6   RDW Latest Ref Range: 11 6 - 15 1 % 13 1   Platelet Count Latest Ref Range: 149 - 390 Thousands/uL 196   MPV Latest Ref Range: 8 9 - 12 7 fL 10 7       COVID-19 (12/28/2021): POSITIVE    Anesthesia Plan  ASA Score- 3     Anesthesia Type- general with ASA Monitors  Additional Monitors:   Airway Plan: ETT  Comment: Npo after MN  Plan Factors-Exercise tolerance (METS): >4 METS  Chart reviewed  Existing labs reviewed  Patient summary reviewed  Patient is not a current smoker  Induction- intravenous  Postoperative Plan- Plan for postoperative opioid use  Planned trial extubation    Informed Consent- Anesthetic plan and risks discussed with patient and spouse  I personally reviewed this patient with the CRNA  Discussed and agreed on the Anesthesia Plan with the CRNA  Cate Hernandes

## 2022-02-16 ENCOUNTER — TELEPHONE (OUTPATIENT)
Dept: SURGICAL ONCOLOGY | Facility: CLINIC | Age: 67
End: 2022-02-16

## 2022-02-16 NOTE — TELEPHONE ENCOUNTER
Received tc from pt with concerns for numbness in the left side of his neck and face  Pt had surgery yesterday for his upper back melanoma and had lymphodes removed from the left side of his neck  Pt admits that the numbness is improved from yesterday  Reassured pt that it is normal to have numbness, tingling and nerve pain following surgery  Pt denies any pain, difficulty with speech, facial droop, or weakness  Just numbness that is improving at the surgical site  Pt appreciative of call  Instructed to call back with any additional questions or concerns

## 2022-02-16 NOTE — OP NOTE
PERATIVE REPORT  PATIENT NAME: Antonieta Jackson    :  1955  MRN: [id number]  Pt Location: AN OR ROOM 03    SURGERY DATE: 2/15/2022    Surgeon(s) and Role:     * Issa Su MD - Primary     * Sammy Leon MD - Assisting    Preop Diagnosis:  Malignant melanoma of upper back ([address] ) [C43 59]    Post-Op Diagnosis Codes:     * Malignant melanoma of upper back ([address] ) [C43 59]    Procedure(s) (LRB):  UPPER BACK LESION WIDE EXCISION (Left)  BIOPSY LYMPH NODE SENTINEL (NUC MED INJECTION AT 1300) (Left)    Specimen(s):  ID Type Source Tests Collected by Time Destination   1 : LEFT NECK SENTINEL LYMPH NODE #1 Tissue Lymph Node, [place] TISSUE EXAM Issa Su MD 2/15/2022 1526    2 : LEFT NECK SENTINEL LYMPH NODE #2 Tissue Lymph Node, [place] TISSUE EXAM Issa Su MD 2/15/2022 1600    3 : UPPER LEFT BACK LESION WIDE EXCISION, STITCH RACHEL AT 12 O'CLOCK Tissue Soft Tissue, Other TISSUE EXAM Issa Su MD 2/15/2022 1633        Estimated Blood Loss:   Minimal    Drains:  * No LDAs found *    Anesthesia Type:   General    Operative Indications:  Malignant melanoma of upper back ([address] ) [C43 59]  T1b melanoma with positive deep margin    Operative Findings:  Poor sentinel lymph node mapping with both lymphoscintigraphy and methylene blue; uneventful WLE of back melanoma    Complications:   None    Procedure and Technique:  The patient was met in preop and the site of mapping was noted all along the left neck at the posterior border of the SCM to the supraclavicular guerda basin  The patient was then transported to preop and finally to the operating room  The patient was identified in and general endotracheal intubation was achieved  The patient's back melanoma was injected with 1 mL methylene blue  A shoulder roll was placed  The patient's left neck was then prepped and draped in the usual sterile fashion    A 5 cm incision was made at the posterior border of the SCM between 2 of the 3 lymph node site markings  The deeper tissues were divided with electrocautery through the platysmas which was divided with electrocautery  Subplatysmal flaps were raised both medially and laterally  The posterior triangle was then explored after the fascia at the posterior border of the SCM was divided  The guerda tissue in at this level was isolated and explored for any signs of elevated Johnny counts  There was 1 region with mildly elevated Ferry count that was excised as sentinel lymph node 1  The count here was only 40  We then continued to explore the posterior neck, and rolled the SCM anteriorly to expose levels 2 3 and 4 as well  These regions were thoroughly explored, with 1 more mildly elevated Ferry count node, which was excised and sent as sentinel lymph node 2  Again, the counts at this node were only about 120  The background counts were also in the 30-40 range  Again, levels 234 and 6 as well as the supraclavicular nodes were thoroughly investigated with no evidence of elevated Ferry counts, blue dye, or abnormal appearance  We thus elected to terminate the sentinel lymph node procedure at that time  The platysma as well as the deep dermal tissue were closed with 3-0 Vicryl  The skin was run with 4-0 Monocryl followed by skin glue  Steri-Strips were placed over the incision after the glue was dry  We then turned our attention to the primary melanoma of the back after repositioning the patient  The margins of the melanoma were circumferentially marked at 1 x 1 2 cm  A circumferential margin of 1 2 cm was marked  The skin was incised sharply to the muscular fascia  The melanoma was then dissected off of the fascia with electrocautery  The specimen was marked with a stitch at 12 o clock  The skin was loosely reapproximated and dog ears were removed sharply  The final dimensions were 3 2 x 6 6 cm  The defect was copiously irrigated and found to be hemostatic    Skin flaps were then raised both superiorly and inferiorly to facilitate a tension-free closure  The deep dermal tissues were closed with interrupted 0 Vicryl suture  Several 2-0 vicryl  sutures were placed in order to relieve any remaining tension  Finally, the skin was run with 3-0 Monocryl  Skin glue and Steri-Strips were placed       I was present for the entire procedure    Patient Disposition:  PACU       SIGNATURE: Tk Mukherjee MD  DATE: February 16, 2022  TIME: 9:44 AM

## 2022-03-01 ENCOUNTER — OFFICE VISIT (OUTPATIENT)
Dept: SURGICAL ONCOLOGY | Facility: CLINIC | Age: 67
End: 2022-03-01

## 2022-03-01 VITALS
RESPIRATION RATE: 14 BRPM | TEMPERATURE: 98.1 F | HEIGHT: 72 IN | OXYGEN SATURATION: 96 % | HEART RATE: 75 BPM | SYSTOLIC BLOOD PRESSURE: 122 MMHG | BODY MASS INDEX: 28.99 KG/M2 | WEIGHT: 214 LBS | DIASTOLIC BLOOD PRESSURE: 78 MMHG

## 2022-03-01 DIAGNOSIS — C43.59 MALIGNANT MELANOMA OF UPPER BACK (HCC): Primary | ICD-10-CM

## 2022-03-01 PROCEDURE — 99024 POSTOP FOLLOW-UP VISIT: CPT | Performed by: STUDENT IN AN ORGANIZED HEALTH CARE EDUCATION/TRAINING PROGRAM

## 2022-03-01 NOTE — LETTER
March 1, 2022     Heather Nava MD  [address] [facility] [address]    Patient: Viri Martin   YOB: 1955   Date of Visit: 3/1/2022       Dear Dr Ernie Robins: Thank you for referring Viri Martin to me for evaluation  Below are my notes for this consultation  If you have questions, please do not hesitate to call me  I look forward to following your patient along with you  Sincerely,        Adiel Acosta MD        CC: No Recipients  Adiel Acosta MD  3/1/2022 10:10 AM  Incomplete  Surgical Oncology Follow Up    [address] [facility] [address] [address] [phone]    Viri Martin  1955  [id number]    Diagnoses and all orders for this visit:    Malignant melanoma of upper back ([address] )  -     US head neck soft tissue; Future        Chief Complaint   Patient presents with    Post-op       No follow-ups on file  Oncology History   Malignant melanoma of upper back ([address] )   12/20/2021 Biopsy    Left upper back Melamona: shave biopsy  Thickness: 0 8mm  Ulceration: none   Mitoses: 0  Margins: Approaches both lateral margins and extends to the deep margin      12/20/2021 -  Cancer Staged    Staging form: Melanoma of the Skin, AJCC 8th Edition  - Clinical stage from 12/20/2021: Stage IB (cT1b, cN0, cM0) - Signed by Heather Nava MD on 1/27/2022 1/17/2022 Initial Diagnosis    Malignant melanoma of upper back ([address] )     2/15/2022 Surgery    A  Lymph node, sentinel, left neck #1:  Two lymph nodes, negative for metastatic melanoma (0/2)  B  Lymph node, sentinel, left neck #2:  Two lymph nodes, negative for metastatic melanoma (0/2)  C  Skin and soft tissue, upper left back, wide excision: Scar  Staging: T1bNx    Treatment history: s/p WLE and insufficient SLN bx 2/15/22  Current treatment: obs  Disease status:     History of Present Illness:   This is a 71-year-old gentleman who presents after wide local excision and sentinel lymph node biopsy for a final path T1b melanoma of the back  The sentinel lymph node mapping in the operating room was insufficient, with no dominant site of elevated Octavia counts or blue dye  Four nodes were ultimately removed, all of which were negative for metastatic melanoma  Patient is doing very well today  No concerns about the incisions  He does have some residual superficial numbness in the area of the neck incision  Review of Systems  Complete ROS Surg Onc:   Constitutional: The patient denies new or recent history of general fatigue, no recent weight loss, no change in appetite  Eyes: No complaints of visual problems, no scleral icterus  ENT: no complaints of ear pain, no hoarseness, no difficulty swallowing,  no tinnitus and no new masses in head, oral cavity, or neck  Cardiovascular: No complaints of chest pain, no palpitations, no ankle edema  Respiratory: No complaints of shortness of breath, no cough  Gastrointestinal: No complaints of jaundice, no bloody stools, no pale stools  Genitourinary: No complaints of dysuria, no hematuria, no nocturia, no frequent urination, no urethral discharge  Musculoskeletal: No complaints of weakness, paralysis, joint stiffness or arthralgias  Integumentary: No complaints of rash, no new lesions  Neurological: No complaints of convulsions, no seizures, no dizziness  Hematologic/Lymphatic: No complaints of easy bruising  Endocrine:  No hot or cold intolerance  No polydipsia, polyphagia, or polyuria  Allergy/immunology:  No environmental allergies  No food allergies  Not immunocompromised  Skin:  No pallor or rash  No wound          Patient Active Problem List   Diagnosis    HTN (hypertension)    Hypercholesterolemia    Numerous moles    GERD (gastroesophageal reflux disease)    Acute left flank pain    Left lower quadrant pain    Diverticulosis of colon    Suprapubic pain, acute    Dehydration    BMI 28 0-28 9,adult    Diverticulitis of large intestine without perforation or abscess without bleeding    Nephrolithiasis    COVID-19 virus infection    Left hip pain    Malignant melanoma of upper back (HCC)     Past Medical History:   Diagnosis Date    Allergic rhinitis     Resolved 1/14/2016     Allergy     Mild; spring and fall    Erectile dysfunction of nonorganic origin     Resolved 1/14/2016     GERD (gastroesophageal reflux disease)     Hyperlipidemia     Rosacea     Resolved 1/14/2016      Past Surgical History:   Procedure Laterality Date    COLONOSCOPY      FACIAL/NECK BIOPSY Right 1/9/2018    Procedure: CHEEK and LOWER EYELID BCC EXCISION AND COMPLEX CLOSURE;  Surgeon: Latasha Krishna MD;  [facility];  Service: Plastics    FL INJECTION LEFT HIP (NON ARTHROGRAM)  2/7/2022    LYMPH NODE BIOPSY Left 2/15/2022    Procedure: BIOPSY LYMPH NODE SENTINEL (NUC MED INJECTION AT 1300); Surgeon: Jose Maria Duran MD;  [facility];  Service: Surgical Oncology    SKIN LESION EXCISION Left 2/15/2022    Procedure: UPPER BACK LESION WIDE EXCISION;  Surgeon: Jose Maria Duran MD;  [facility];  Service: Surgical Oncology    WISDOM TOOTH EXTRACTION       Family History   Problem Relation Age of Onset    Skin cancer Mother     No Known Problems Father      Social History     Socioeconomic History    Marital status: /Civil Union     Spouse name: Not on file    Number of children: Not on file    Years of education: Not on file    Highest education level: Not on file   Occupational History    Not on file   Tobacco Use    Smoking status: Light Tobacco Smoker     Types: Cigars    Smokeless tobacco: Never Used    Tobacco comment: cigar 6 times a year   Vaping Use    Vaping Use: Never used   Substance and Sexual Activity    Alcohol use:  Yes     Alcohol/week: 1 0 standard drink     Types: 1 Shots of liquor per week     Comment: occasionally    Drug use: No    Sexual activity: Not on file   Other Topics Concern    Not on file   Social History Narrative    Former smoker - As per [employer]    Never used drugs - As per [employer]      Social Determinants of Health     Financial Resource Strain: Not on file   Food Insecurity: Not on file   Transportation Needs: Not on file   Physical Activity: Not on file   Stress: Not on file   Social Connections: Not on file   Intimate Partner Violence: Not on file   Housing Stability: Not on file       Current Outpatient Medications:     atorvastatin (LIPITOR) 20 mg tablet, TAKE ONE TABLET BY MOUTH EVERY DAY, Disp: 90 tablet, Rfl: 3    losartan (COZAAR) 100 MG tablet, TAKE ONE TABLET BY MOUTH EVERY DAY, Disp: 90 tablet, Rfl: 3    omeprazole (PRILOSEC) 40 MG capsule, Take 40 mg by mouth daily Every other day, Disp: , Rfl:     traMADol (Ultram) 50 mg tablet, Take 1 tablet (50 mg total) by mouth every 6 (six) hours as needed for moderate pain, Disp: 12 tablet, Rfl: 0  Allergies   Allergen Reactions    Neomycin-Polymyxin-Dexameth      Annotation - [id number]: skin peeled around eyes    Pollen Extract      Vitals:    03/01/22 0850   BP: 122/78   Pulse: 75   Resp: 14   Temp: 98 1 °F (36 7 °C)   SpO2: 96%       Physical Exam  Constitutional: Patient is well-developed and well-nourished who appears the stated age in no acute distress  Patient is pleasant and talkative  HEENT:  Normocephalic  Sclerae are anicteric  Mucous membranes are moist  Neck is supple without adenopathy  No JVD  Chest: Equal chest rise, easy WOB  Cardiac: Heart is regular rate  Abdomen: Abdomen is non-tender, non-distended and without masses  Extremities: There is no clubbing or cyanosis  There is no edema  Symmetric  Neuro: Grossly nonfocal  Gait is normal      Lymphatic: No evidence of cervical adenopathy bilaterally  No evidence of axillary adenopathy bilaterally  No evidence of inguinal adenopathy bilaterally  Skin: Warm, anicteric  Incisions healing well  Psych:  Patient is pleasant and talkative  Pathology:  As above    Labs:  Reviewed in [employer] personally    Imaging  NM lymphatic melanoma    Addendum Date: 2/15/2022 Addendum:   ADDENDUM: Please note the dictation error documenting the site at left upper back  0 48 mCi Tc-99m sulfur colloid (0 6 cc volume) was administered intradermally in divided doses in the region of the patients left upper back melanoma  Result Date: 2/15/2022  Narrative: SENTINEL NODE LYMPHOSCINTIGRAPHY INDICATION:   Left upper back melanoma  FINDINGS: 0 48 mCi Tc-99m sulfur colloid (0 6 cc volume) was administered intradermally in divided doses in the region of the patients left upper pelvic melanoma  Scintigraphic images were obtained over the chest and neck  Initially 2 foci of intense tracer uptake identified in the left neck base followed by a  slightly inferior tracer uptake  Using scintigraphic guidance, the corresponding skin sites were marked with an indelible marker  The patient was transferred to the operating room in satisfactory condition  Impression: 1  [place] lymph node localized to left neck base to at least 3 foci of tracer uptake    Workstation performed: [id number]     FL injection left hip (non arthrogram)    Result Date: 2/7/2022  Narrative: LEFT HIP INJECTION INDICATION:  Patient presents with prescription for left hip steroid injection  COMPARISON:  Plain films hips and pelvis left 1/8/2022  IMAGES:  3 FLUOROSCOPY TIME:  0 1 MFT  FINDINGS: After the risks and benefits of the procedure were thoroughly explained, informed consent was obtained  The patient was prepped and draped in the usual sterile fashion  1% lidocaine solution was utilized for local anesthesia  Intermittent fluoroscopy was utilized for placement a 20 gauge  3 5 inch spinal needle within the left hip joint    After positioning was confirmed with 3 mL of Omnipaque 300, a solution comprised of 1 mL 80 mg/mL Depomedrol, 2 mL of 0 25% Sensorcaine and 2 mL 1% Xylocaine  was injected into the left hip joint  The patient tolerated the procedure well  There were no complications  Impression: Post injection patient does admit to mid improvement of his typical left hip pain  Technically successful left hip steroid injection  Procedure was performed by ELZA Mantilla PA-C under the direct supervision of Dr Jana Bowie  Workstation performed: [id number]       I reviewed the above laboratory and imaging data  Discussion/Summary:   I discussed with the patient today that he has a T1 B melanoma of the back, with an insufficient sentinel lymph node performed  Again, his lymph node mapping was insufficient in the operating room, with no dominant lesions detected with elevated Johnny counts or blue dye  We discussed that with his depth of melanoma, the chance of lymph node metastases remains very low, however, I think it is prudent to pursue an [facility]-II  protocol surveillance of the lymph node basin  I will therefore obtain a neck ultrasound in 3 months time when he comes back to see me for routine surveillance  Dicussed surveillance with q3 mo visits with derm for skin checks as well as q6 mo visits with me for repeat PE  Discussed vigilance about new skin lesions in this region or new lumps/bumps in LN basins  Discussed need for continued sun protection with sunscreen, hats, minimizing sun exposure  Patient and family expressed understanding and endorsement

## 2022-03-01 NOTE — PROGRESS NOTES
Surgical Oncology Follow Up    [address] [facility] [address]  [place] [address]  [phone]    Roni Chaudhry  1955  [id number]    Diagnoses and all orders for this visit:    Malignant melanoma of upper back ([address] )  -     US head neck soft tissue; Future        Chief Complaint   Patient presents with    Post-op       No follow-ups on file  Oncology History   Malignant melanoma of upper back ([address] )   12/20/2021 Biopsy    Left upper back Melamona: shave biopsy  Thickness: 0 8mm  Ulceration: none   Mitoses: 0  Margins: Approaches both lateral margins and extends to the deep margin      12/20/2021 -  Cancer Staged    Staging form: Melanoma of the Skin, AJCC 8th Edition  - Clinical stage from 12/20/2021: Stage IB (cT1b, cN0, cM0) - Signed by Stephani Beck MD on 1/27/2022 1/17/2022 Initial Diagnosis    Malignant melanoma of upper back ([address] )     2/15/2022 Surgery    A  Lymph node, sentinel, left neck #1:  Two lymph nodes, negative for metastatic melanoma (0/2)  B  Lymph node, sentinel, left neck #2:  Two lymph nodes, negative for metastatic melanoma (0/2)  C  Skin and soft tissue, upper left back, wide excision: Scar  Staging: T1bNx    Treatment history: s/p WLE and insufficient SLN bx 2/15/22  Current treatment: obs  Disease status:     History of Present Illness: This is a 54-year-old gentleman who presents after wide local excision and sentinel lymph node biopsy for a final path T1b melanoma of the back  The sentinel lymph node mapping in the operating room was insufficient, with no dominant site of elevated Johnny counts or blue dye  Four nodes were ultimately removed, all of which were negative for metastatic melanoma  Patient is doing very well today  No concerns about the incisions  He does have some residual superficial numbness in the area of the neck incision      Review of Systems  Complete ROS Surg Onc:   Constitutional: The patient denies new or recent history of general fatigue, no recent weight loss, no change in appetite  Eyes: No complaints of visual problems, no scleral icterus  ENT: no complaints of ear pain, no hoarseness, no difficulty swallowing,  no tinnitus and no new masses in head, oral cavity, or neck  Cardiovascular: No complaints of chest pain, no palpitations, no ankle edema  Respiratory: No complaints of shortness of breath, no cough  Gastrointestinal: No complaints of jaundice, no bloody stools, no pale stools  Genitourinary: No complaints of dysuria, no hematuria, no nocturia, no frequent urination, no urethral discharge  Musculoskeletal: No complaints of weakness, paralysis, joint stiffness or arthralgias  Integumentary: No complaints of rash, no new lesions  Neurological: No complaints of convulsions, no seizures, no dizziness  Hematologic/Lymphatic: No complaints of easy bruising  Endocrine:  No hot or cold intolerance  No polydipsia, polyphagia, or polyuria  Allergy/immunology:  No environmental allergies  No food allergies  Not immunocompromised  Skin:  No pallor or rash  No wound          Patient Active Problem List   Diagnosis    HTN (hypertension)    Hypercholesterolemia    Numerous moles    GERD (gastroesophageal reflux disease)    Acute left flank pain    Left lower quadrant pain    Diverticulosis of colon    Suprapubic pain, acute    Dehydration    BMI 28 0-28 9,adult    Diverticulitis of large intestine without perforation or abscess without bleeding    Nephrolithiasis    COVID-19 virus infection    Left hip pain    Malignant melanoma of upper back [facility])     Past Medical History:   Diagnosis Date    Allergic rhinitis     Resolved 1/14/2016     Allergy     Mild; spring and fall    Erectile dysfunction of nonorganic origin     Resolved 1/14/2016     GERD (gastroesophageal reflux disease)     Hyperlipidemia     Rosacea     Resolved 1/14/2016      Past Surgical History:   Procedure Laterality Date    COLONOSCOPY      FACIAL/NECK BIOPSY Right 1/9/2018    Procedure: CHEEK and LOWER EYELID BCC EXCISION AND COMPLEX CLOSURE;  Surgeon: Juvencio Foss MD;  [facility];  Service: Plastics    FL INJECTION LEFT HIP (NON ARTHROGRAM)  2/7/2022    LYMPH NODE BIOPSY Left 2/15/2022    Procedure: BIOPSY LYMPH NODE SENTINEL (NUC MED INJECTION AT 1300); Surgeon: Sandie Zepeda MD;  [facility];  Service: Surgical Oncology    SKIN LESION EXCISION Left 2/15/2022    Procedure: UPPER BACK LESION WIDE EXCISION;  Surgeon: Sandie Zepeda MD;  [facility];  Service: Surgical Oncology    WISDOM TOOTH EXTRACTION       Family History   Problem Relation Age of Onset    Skin cancer Mother     No Known Problems Father      Social History     Socioeconomic History    Marital status: /Civil Union     Spouse name: Not on file    Number of children: Not on file    Years of education: Not on file    Highest education level: Not on file   Occupational History    Not on file   Tobacco Use    Smoking status: Light Tobacco Smoker     Types: Cigars    Smokeless tobacco: Never Used    Tobacco comment: cigar 6 times a year   Vaping Use    Vaping Use: Never used   Substance and Sexual Activity    Alcohol use:  Yes     Alcohol/week: 1 0 standard drink     Types: 1 Shots of liquor per week     Comment: occasionally    Drug use: No    Sexual activity: Not on file   Other Topics Concern    Not on file   Social History Narrative    Former smoker - As per [employer]    Never used drugs - As per [employer]      Social Determinants of Health     Financial Resource Strain: Not on file   Food Insecurity: Not on file   Transportation Needs: Not on file   Physical Activity: Not on file   Stress: Not on file   Social Connections: Not on file   Intimate Partner Violence: Not on file   Housing Stability: Not on file       Current Outpatient Medications:     atorvastatin (LIPITOR) 20 mg tablet, TAKE ONE TABLET BY MOUTH EVERY DAY, Disp: 90 tablet, Rfl: 3    losartan (COZAAR) 100 MG tablet, TAKE ONE TABLET BY MOUTH EVERY DAY, Disp: 90 tablet, Rfl: 3    omeprazole (PRILOSEC) 40 MG capsule, Take 40 mg by mouth daily Every other day, Disp: , Rfl:     traMADol (Ultram) 50 mg tablet, Take 1 tablet (50 mg total) by mouth every 6 (six) hours as needed for moderate pain, Disp: 12 tablet, Rfl: 0  Allergies   Allergen Reactions    Neomycin-Polymyxin-Dexameth      Annotation - [id number]: skin peeled around eyes    Pollen Extract      Vitals:    03/01/22 0850   BP: 122/78   Pulse: 75   Resp: 14   Temp: 98 1 °F (36 7 °C)   SpO2: 96%       Physical Exam  Constitutional: Patient is well-developed and well-nourished who appears the stated age in no acute distress  Patient is pleasant and talkative  HEENT:  Normocephalic  Sclerae are anicteric  Mucous membranes are moist  Neck is supple without adenopathy  No JVD  Chest: Equal chest rise, easy WOB  Cardiac: Heart is regular rate  Abdomen: Abdomen is non-tender, non-distended and without masses  Extremities: There is no clubbing or cyanosis  There is no edema  Symmetric  Neuro: Grossly nonfocal  Gait is normal      Lymphatic: No evidence of cervical adenopathy bilaterally  No evidence of axillary adenopathy bilaterally  No evidence of inguinal adenopathy bilaterally  Skin: Warm, anicteric  Incisions healing well  Psych:  Patient is pleasant and talkative  Pathology:  As above    Labs:  Reviewed in [employer] personally    Imaging  NM lymphatic melanoma    Addendum Date: 2/15/2022 Addendum:   ADDENDUM: Please note the dictation error documenting the site at left upper back  0 48 mCi Tc-99m sulfur colloid (0 6 cc volume) was administered intradermally in divided doses in the region of the patients left upper back melanoma        Result Date: 2/15/2022  Narrative: SENTINEL NODE LYMPHOSCINTIGRAPHY INDICATION:   Left upper back melanoma  FINDINGS: 0 48 mCi Tc-99m sulfur colloid (0 6 cc volume) was administered intradermally in divided doses in the region of the patients left upper pelvic melanoma  Scintigraphic images were obtained over the chest and neck  Initially 2 foci of intense tracer uptake identified in the left neck base followed by a  slightly inferior tracer uptake  Using scintigraphic guidance, the corresponding skin sites were marked with an indelible marker  The patient was transferred to the operating room in satisfactory condition  Impression: 1  [place] lymph node localized to left neck base to at least 3 foci of tracer uptake    Workstation performed: [id number]     FL injection left hip (non arthrogram)    Result Date: 2/7/2022  Narrative: LEFT HIP INJECTION INDICATION:  Patient presents with prescription for left hip steroid injection  COMPARISON:  Plain films hips and pelvis left 1/8/2022  IMAGES:  3 FLUOROSCOPY TIME:  0 1 MFT  FINDINGS: After the risks and benefits of the procedure were thoroughly explained, informed consent was obtained  The patient was prepped and draped in the usual sterile fashion  1% lidocaine solution was utilized for local anesthesia  Intermittent fluoroscopy was utilized for placement a 20 gauge  3 5 inch spinal needle within the left hip joint  After positioning was confirmed with 3 mL of Omnipaque 300, a solution comprised of 1 mL 80 mg/mL Depomedrol, 2 mL of 0 25% Sensorcaine and 2 mL 1% Xylocaine  was injected into the left hip joint  The patient tolerated the procedure well  There were no complications  Impression: Post injection patient does admit to mid improvement of his typical left hip pain  Technically successful left hip steroid injection  Procedure was performed by ELZA Mercado PA-C under the direct supervision of Dr Denis Hernández   Workstation performed: [id number]       I reviewed the above laboratory and imaging data  Discussion/Summary:   I discussed with the patient today that he has a T1 B melanoma of the back, with an insufficient sentinel lymph node performed  Again, his lymph node mapping was insufficient in the operating room, with no dominant lesions detected with elevated Johnny counts or blue dye  We discussed that with his depth of melanoma, the chance of lymph node metastases remains very low, however, I think it is prudent to pursue an [facility]  protocol surveillance of the lymph node basin  I will therefore obtain a neck ultrasound in 3 months time when he comes back to see me for routine surveillance  Dicussed surveillance with q3 mo visits with derm for skin checks as well as q6 mo visits with me for repeat PE  Discussed vigilance about new skin lesions in this region or new lumps/bumps in LN basins  Discussed need for continued sun protection with sunscreen, hats, minimizing sun exposure  Patient and family expressed understanding and endorsement

## 2022-03-02 ENCOUNTER — OFFICE VISIT (OUTPATIENT)
Dept: HEMATOLOGY ONCOLOGY | Facility: CLINIC | Age: 67
End: 2022-03-02
Payer: MEDICARE

## 2022-03-02 VITALS
RESPIRATION RATE: 16 BRPM | BODY MASS INDEX: 29.12 KG/M2 | WEIGHT: 215 LBS | HEART RATE: 76 BPM | OXYGEN SATURATION: 98 % | TEMPERATURE: 98.3 F | DIASTOLIC BLOOD PRESSURE: 84 MMHG | SYSTOLIC BLOOD PRESSURE: 124 MMHG | HEIGHT: 72 IN

## 2022-03-02 DIAGNOSIS — C43.59 MALIGNANT MELANOMA OF TORSO EXCLUDING BREAST (HCC): ICD-10-CM

## 2022-03-02 DIAGNOSIS — C43.59 MALIGNANT MELANOMA OF UPPER BACK (HCC): Primary | ICD-10-CM

## 2022-03-02 PROCEDURE — 99213 OFFICE O/P EST LOW 20 MIN: CPT | Performed by: INTERNAL MEDICINE

## 2022-03-02 NOTE — LETTER
March 2, 2022     Aida Agosto, [address]    Patient: Roni Chaudhry   YOB: 1955   Date of Visit: 3/2/2022       Dear Dr Martín Yanes:    Thank you for referring Roni Chaudhry to me for evaluation  Below are my notes for this consultation  If you have questions, please do not hesitate to call me  I look forward to following your patient along with you  Sincerely,        Stephani Beck MD        CC: Minette Pro, PA-C Michaele Gowers , MD Rock Polio, MD Shannon Lias, MD  3/2/2022 11:39 PM  Sign when Signing Visit  [address] [place] [address] A[address]  [phone]  [phone]     Date of Visit: 3/2/2022  Name: Roni Chaudhry   YOB: 1955        Subjective    VISIT DIAGNOSIS:  Diagnoses and all orders for this visit:    Malignant melanoma of upper back ([address] )    Malignant melanoma of torso excluding breast ([address] )  -     CBC and differential; Future  -     Comprehensive metabolic panel; Future  -     LD,Blood; Future        Oncology History   Malignant melanoma of upper back ([address] )   12/20/2021 Biopsy    Left upper back Melamona: shave biopsy  Thickness: 0 8mm  Ulceration: none   Mitoses: 0  Margins: Approaches both lateral margins and extends to the deep margin      12/20/2021 -  Cancer Staged    Staging form: Melanoma of the Skin, AJCC 8th Edition  - Clinical stage from 12/20/2021: Stage IB (cT1b, cN0, cM0) - Signed by Stephani Beck MD on 1/27/2022 1/17/2022 Initial Diagnosis    Malignant melanoma of upper back ([address] )     2/15/2022 Surgery    A  Lymph node, sentinel, left neck #1:  Two lymph nodes, negative for metastatic melanoma (0/2)  B  Lymph node, sentinel, left neck #2:  Two lymph nodes, negative for metastatic melanoma (0/2)  C  Skin and soft tissue, upper left back, wide excision: Scar          Cancer Staging  Malignant melanoma of upper back [facility])  Staging form: Melanoma of the Skin, AJCC 8th Edition  - Clinical stage from 12/20/2021: Stage IB (cT1b, cN0, cM0) - Signed by Hien Gutierrez MD on 1/27/2022      HISTORY OF PRESENT ILLNESS: Marialuisa Christianson is a 79 y o  male  who was recenlty diagnosed with melanoma and underwent WLE and SLNbx with Dr Matthew Braun on 2/15/2022  He is doing well and feels good after surgery  He does have some numbness in his left neck area around the surgical scar as well as surrounding skin areas  Recovering well  Normal range of motion  We discussed the results of his wide local excision and sentinel node biopsy which demonstrate no residual melanoma and scar in the excision sample  He does have 2 adjacent incidental nevi that were identified with 1 moderate atypical compound melanocytic nevus abutting the 12-3 o'clock margin  We discussed that his lymph nodes were negative for melanoma  He had 4 lymph nodes removed and sample  Dr Castro Sessions did not identify 1 specific sentinel lymph node biopsy during the mapping procedure so these were chosen for sampling  She will continue to monitor these along the MSLT-II guidelines with ultrasounds  He otherwise feels well  Denies any new, changing, or concerning skin lesions  Denies any new lymphadenopathy  REVIEW OF SYSTEMS:  Review of Systems   Constitutional: Negative for appetite change, fatigue, fever and unexpected weight change  HENT:   Negative for lump/mass  Eyes: Negative for icterus  Respiratory: Negative for cough, shortness of breath and wheezing  Cardiovascular: Negative for leg swelling  Gastrointestinal: Negative for abdominal pain, constipation, diarrhea, nausea and vomiting  Genitourinary: Negative for difficulty urinating and hematuria  Musculoskeletal: Negative for arthralgias, gait problem and myalgias  Skin: Negative for itching and rash  No new, changing, or concerning lesions     Neurological: Negative for extremity weakness, gait problem, headaches, light-headedness and numbness  Hematological: Negative for adenopathy          MEDICATIONS:    Current Outpatient Medications:     atorvastatin (LIPITOR) 20 mg tablet, TAKE ONE TABLET BY MOUTH EVERY DAY, Disp: 90 tablet, Rfl: 3    losartan (COZAAR) 100 MG tablet, TAKE ONE TABLET BY MOUTH EVERY DAY, Disp: 90 tablet, Rfl: 3    omeprazole (PRILOSEC) 40 MG capsule, Take 40 mg by mouth daily Every other day, Disp: , Rfl:     traMADol (Ultram) 50 mg tablet, Take 1 tablet (50 mg total) by mouth every 6 (six) hours as needed for moderate pain (Patient not taking: Reported on 3/2/2022 ), Disp: 12 tablet, Rfl: 0     ALLERGIES:  Allergies   Allergen Reactions    Neomycin-Polymyxin-Dexameth      Annotation - [id number]: skin peeled around eyes    Pollen Extract         ACTIVE PROBLEMS:  Patient Active Problem List   Diagnosis    HTN (hypertension)    Hypercholesterolemia    Numerous moles    GERD (gastroesophageal reflux disease)    Acute left flank pain    Left lower quadrant pain    Diverticulosis of colon    Suprapubic pain, acute    Dehydration    BMI 28 0-28 9,adult    Diverticulitis of large intestine without perforation or abscess without bleeding    Nephrolithiasis    COVID-19 virus infection    Left hip pain    Malignant melanoma of upper back [facility] )          PAST MEDICAL HISTORY:   Past Medical History:   Diagnosis Date    Allergic rhinitis     Resolved 1/14/2016     Allergy     Mild; spring and fall    Erectile dysfunction of nonorganic origin     Resolved 1/14/2016     GERD (gastroesophageal reflux disease)     Hyperlipidemia     Rosacea     Resolved 1/14/2016         PAST SURGICAL HISTORY:  Past Surgical History:   Procedure Laterality Date    COLONOSCOPY      FACIAL/NECK BIOPSY Right 1/9/2018    Procedure: CHEEK and LOWER EYELID BCC EXCISION AND COMPLEX CLOSURE;  Surgeon: Billy Meléndez MD;  [facility];  Service: Plastics    FL INJECTION LEFT HIP (NON ARTHROGRAM)  2/7/2022    LYMPH NODE BIOPSY Left 2/15/2022    Procedure: BIOPSY LYMPH NODE SENTINEL (NUC MED INJECTION AT 1300); Surgeon: Waldo Fuentes MD;  [facility];  Service: Surgical Oncology    SKIN LESION EXCISION Left 2/15/2022    Procedure: UPPER BACK LESION WIDE EXCISION;  Surgeon: Waldo Fuentes MD;  [facility];  Service: Surgical Oncology    WISDOM TOOTH EXTRACTION          SOCIAL HISTORY:  Social History     Socioeconomic History    Marital status: /Civil Union     Spouse name: None    Number of children: None    Years of education: None    Highest education level: None   Occupational History    None   Tobacco Use    Smoking status: Light Tobacco Smoker     Types: Cigars    Smokeless tobacco: Never Used    Tobacco comment: cigar 6 times a year   Vaping Use    Vaping Use: Never used   Substance and Sexual Activity    Alcohol use: Yes     Alcohol/week: 1 0 standard drink     Types: 1 Shots of liquor per week     Comment: occasionally    Drug use: No    Sexual activity: None   Other Topics Concern    None   Social History Narrative    Former smoker - As per [employer]    Never used drugs - As per [employer]      Social Determinants of Health     Financial Resource Strain: Not on file   Food Insecurity: Not on file   Transportation Needs: Not on file   Physical Activity: Not on file   Stress: Not on file   Social Connections: Not on file   Intimate Partner Violence: Not on file   Housing Stability: Not on file        FAMILY HISTORY:  Family History   Problem Relation Age of Onset    Skin cancer Mother     No Known Problems Father            Objective    PHYSICAL EXAMINATION:   Blood pressure 124/84, pulse 76, temperature 98 3 °F (36 8 °C), temperature source Temporal, resp  rate 16, height 6' (1 829 m), weight 97 5 kg (215 lb), SpO2 98 %       Pain Score: 0-No pain      ECOG Performance Status      Most Recent Value   ECOG Performance Status 0 - Fully active, able to carry on all pre-disease performance without restriction            Physical Exam  Constitutional:       General: He is not in acute distress  Appearance: Normal appearance  He is not toxic-appearing  HENT:      Mouth/Throat:      Mouth: Mucous membranes are moist       Pharynx: Oropharynx is clear  Eyes:      General: No scleral icterus  Cardiovascular:      Rate and Rhythm: Normal rate and regular rhythm  Pulses: Normal pulses  Heart sounds: No murmur heard  No friction rub  No gallop  Pulmonary:      Effort: Pulmonary effort is normal  No respiratory distress  Breath sounds: Normal breath sounds  No wheezing or rales  Chest:   Breasts:      Right: No axillary adenopathy or supraclavicular adenopathy  Left: No axillary adenopathy or supraclavicular adenopathy  Abdominal:      General: There is no distension  Palpations: There is no mass  Tenderness: There is no abdominal tenderness  There is no rebound  Musculoskeletal:         General: No swelling or tenderness  Right lower leg: No edema  Left lower leg: No edema  Lymphadenopathy:      Head:      Right side of head: No submandibular, preauricular or posterior auricular adenopathy  Left side of head: No submandibular, preauricular or posterior auricular adenopathy  Cervical: No cervical adenopathy  Right cervical: No superficial or posterior cervical adenopathy  Left cervical: No superficial (healing surgical scar  Some scar tissue but no discrete nodularity or masses) or posterior cervical adenopathy  Upper Body:      Right upper body: No supraclavicular or axillary adenopathy  Left upper body: No supraclavicular or axillary adenopathy  Skin:     Findings: No rash  Comments: Well healed surgical scar  No evidence of recurrence at primary site  Neurological:      General: No focal deficit present        Mental Status: He is alert and oriented to person, place, and time  Psychiatric:         Mood and Affect: Mood normal          Behavior: Behavior normal          Thought Content: Thought content normal          Judgment: Judgment normal          I reviewed lab data in the chart  WBC   Date Value Ref Range Status   01/27/2022 5 32 4 31 - 10 16 Thousand/uL Final   11/04/2021 10 61 (H) 4 31 - 10 16 Thousand/uL Final     Hemoglobin   Date Value Ref Range Status   01/27/2022 15 0 12 0 - 17 0 g/dL Final   11/04/2021 15 1 12 0 - 17 0 g/dL Final     Platelets   Date Value Ref Range Status   01/27/2022 196 149 - 390 Thousands/uL Final   11/04/2021 230 149 - 390 Thousands/uL Final     MCV   Date Value Ref Range Status   01/27/2022 91 82 - 98 fL Final   11/04/2021 94 82 - 98 fL Final      Potassium   Date Value Ref Range Status   01/27/2022 4 2 3 5 - 5 3 mmol/L Final   11/04/2021 3 8 3 5 - 5 3 mmol/L Final     Chloride   Date Value Ref Range Status   01/27/2022 106 100 - 108 mmol/L Final   11/04/2021 100 100 - 108 mmol/L Final     CO2   Date Value Ref Range Status   01/27/2022 27 21 - 32 mmol/L Final   11/04/2021 27 21 - 32 mmol/L Final     BUN   Date Value Ref Range Status   01/27/2022 22 5 - 25 mg/dL Final   11/04/2021 16 5 - 25 mg/dL Final     Creatinine   Date Value Ref Range Status   01/27/2022 1 28 0 60 - 1 30 mg/dL Final     Comment:     Standardized to IDMS reference method   11/04/2021 1 54 (H) 0 60 - 1 30 mg/dL Final     Comment:     Standardized to IDMS reference method     Glucose   Date Value Ref Range Status   11/04/2021 94 65 - 140 mg/dL Final     Comment:     If the patient is fasting, the ADA then defines impaired fasting glucose as > 100 mg/dL and diabetes as > or equal to 123 mg/dL  Specimen collection should occur prior to Sulfasalazine administration due to the potential for falsely depressed results  Specimen collection should occur prior to Sulfapyridine administration due to the potential for falsely elevated results  Calcium   Date Value Ref Range Status   01/27/2022 9 9 8 3 - 10 1 mg/dL Final   11/04/2021 9 1 8 3 - 10 1 mg/dL Final     Albumin   Date Value Ref Range Status   01/27/2022 3 8 3 5 - 5 0 g/dL Final   11/04/2021 3 9 3 5 - 5 0 g/dL Final     Total Bilirubin   Date Value Ref Range Status   01/27/2022 0 73 0 20 - 1 00 mg/dL Final     Comment:     Use of this assay is not recommended for patients undergoing treatment with eltrombopag due to the potential for falsely elevated results  11/04/2021 1 28 (H) 0 20 - 1 00 mg/dL Final     Comment:     Use of this assay is not recommended for patients undergoing treatment with eltrombopag due to the potential for falsely elevated results  Alkaline Phosphatase   Date Value Ref Range Status   01/27/2022 65 46 - 116 U/L Final   11/04/2021 71 46 - 116 U/L Final     AST   Date Value Ref Range Status   01/27/2022 17 5 - 45 U/L Final     Comment:     Specimen collection should occur prior to Sulfasalazine administration due to the potential for falsely depressed results  11/04/2021 16 5 - 45 U/L Final     Comment:     Specimen collection should occur prior to Sulfasalazine administration due to the potential for falsely depressed results  ALT   Date Value Ref Range Status   01/27/2022 31 12 - 78 U/L Final     Comment:     Specimen collection should occur prior to Sulfasalazine and/or Sulfapyridine administration due to the potential for falsely depressed results  11/04/2021 32 12 - 78 U/L Final     Comment:     Specimen collection should occur prior to Sulfasalazine administration due to the potential for falsely depressed results         LD   Date Value Ref Range Status   01/27/2022 186 81 - 234 U/L Final     No results found for: TSH  No results found for: [id number]   No results found for: FREET4      RECENT IMAGING:  Procedure: NM lymphatic melanoma    Addendum Date: 2/15/2022 Addendum:   ADDENDUM: Please note the dictation error documenting the site at left upper back  0 48 mCi Tc-99m sulfur colloid (0 6 cc volume) was administered intradermally in divided doses in the region of the patients left upper back melanoma  Result Date: 2/15/2022  Narrative: SENTINEL NODE LYMPHOSCINTIGRAPHY INDICATION:   Left upper back melanoma  FINDINGS: 0 48 mCi Tc-99m sulfur colloid (0 6 cc volume) was administered intradermally in divided doses in the region of the patients left upper pelvic melanoma  Scintigraphic images were obtained over the chest and neck  Initially 2 foci of intense tracer uptake identified in the left neck base followed by a  slightly inferior tracer uptake  Using scintigraphic guidance, the corresponding skin sites were marked with an indelible marker  The patient was transferred to the operating room in satisfactory condition  Impression: 1  [place] lymph node localized to left neck base to at least 3 foci of tracer uptake    Workstation performed: [id number]     Procedure: FL injection left hip (non arthrogram)    Result Date: 2/7/2022  Narrative: LEFT HIP INJECTION INDICATION:  Patient presents with prescription for left hip steroid injection  COMPARISON:  Plain films hips and pelvis left 1/8/2022  IMAGES:  3 FLUOROSCOPY TIME:  0 1 MFT  FINDINGS: After the risks and benefits of the procedure were thoroughly explained, informed consent was obtained  The patient was prepped and draped in the usual sterile fashion  1% lidocaine solution was utilized for local anesthesia  Intermittent fluoroscopy was utilized for placement a 20 gauge  3 5 inch spinal needle within the left hip joint  After positioning was confirmed with 3 mL of Omnipaque 300, a solution comprised of 1 mL 80 mg/mL Depomedrol, 2 mL of 0 25% Sensorcaine and 2 mL 1% Xylocaine  was injected into the left hip joint  The patient tolerated the procedure well  There were no complications  Impression: Post injection patient does admit to mid improvement of his typical left hip pain  Technically successful left hip steroid injection  Procedure was performed by ELZA Schmitz PA-C under the direct supervision of Dr Zabrina Mendoza  Workstation performed: [id number]             Assessment/Plan  Mr Ana Rosa Smith is a 79 yr male with Stage IB melanoma here for follow up  1  Malignant melanoma of upper back ([address] )  2  Malignant melanoma of torso excluding breast ([address] )  He is status post definitive surgical treatment with wide local excision and sentinel lymph node biopsies  There was indeterminate sentinel lymph node identification, but sample lymph nodes were negative for melanoma  He will be monitored with ultrasounds of his left neck by Dr Yoel Kimball  Orders have been placed for his upcoming ultrasound  He discussed that he has stage I B melanoma  We discussed that he is a low risk of recurrence that is more at risk for development of new primary melanomas  We discussed that we will continue with surveillance for melanoma recurrence as well as development of new primary melanoma  We recommend that he continues close follow-up with Dermatology  We will see him every 6 months for physical exams and blood work  We will monitor along with Dr Yoel Kimball ultrasounds of his left neck for lymph node involvement  If clinically indicated we can obtain scans as needed  Orders placed for blood work for follow-up in 6 months  Scripts provided  He and his wife know to call with any issues or concerns prior to his next visit  - CBC and differential; Future  - Comprehensive metabolic panel; Future  - LD,Blood;  Future      Daniel Lechuga MD, PhD

## 2022-03-03 NOTE — PROGRESS NOTES
[facility]ATOLOG[facility]  [address] [facility] [address]  [phone]     Date of Visit: 3/2/2022  Name: Arcadio Cristobal   YOB: 1955        Subjective    VISIT DIAGNOSIS:  Diagnoses and all orders for this visit:    Malignant melanoma of upper back ([address] )    Malignant melanoma of torso excluding breast ([address] )  -     CBC and differential; Future  -     Comprehensive metabolic panel; Future  -     LD,Blood; Future        Oncology History   Malignant melanoma of upper back ([address] )   12/20/2021 Biopsy    Left upper back Melamona: shave biopsy  Thickness: 0 8mm  Ulceration: none   Mitoses: 0  Margins: Approaches both lateral margins and extends to the deep margin      12/20/2021 -  Cancer Staged    Staging form: Melanoma of the Skin, AJCC 8th Edition  - Clinical stage from 12/20/2021: Stage IB (cT1b, cN0, cM0) - Signed by Karrie Lang MD on 1/27/2022 1/17/2022 Initial Diagnosis    Malignant melanoma of upper back ([facility] [address] )     2/15/2022 Surgery    A  Lymph node, sentinel, left neck #1:  Two lymph nodes, negative for metastatic melanoma (0/2)  B  Lymph node, sentinel, left neck #2:  Two lymph nodes, negative for metastatic melanoma (0/2)  C  Skin and soft tissue, upper left back, wide excision: Scar  Cancer Staging  Malignant melanoma of upper back [facility])  Staging form: Melanoma of the Skin, AJCC 8th Edition  - Clinical stage from 12/20/2021: Stage IB (cT1b, cN0, cM0) - Signed by Karrie Lang MD on 1/27/2022      HISTORY OF PRESENT ILLNESS: Arcadio Cristobal is a 79 y o  male  who was recenlty diagnosed with melanoma and underwent WLE and SLNbx with Dr Megha Ceballos on 2/15/2022  He is doing well and feels good after surgery  He does have some numbness in his left neck area around the surgical scar as well as surrounding skin areas  Recovering well  Normal range of motion      We discussed the results of his wide local excision and sentinel node biopsy which demonstrate no residual melanoma and scar in the excision sample  He does have 2 adjacent incidental nevi that were identified with 1 moderate atypical compound melanocytic nevus abutting the 12-3 o'clock margin  We discussed that his lymph nodes were negative for melanoma  He had 4 lymph nodes removed and sample  Dr Yoel Churchill did not identify 1 specific sentinel lymph node biopsy during the mapping procedure so these were chosen for sampling  She will continue to monitor these along the MSLT-II guidelines with ultrasounds  He otherwise feels well  Denies any new, changing, or concerning skin lesions  Denies any new lymphadenopathy  REVIEW OF SYSTEMS:  Review of Systems   Constitutional: Negative for appetite change, fatigue, fever and unexpected weight change  HENT:   Negative for lump/mass  Eyes: Negative for icterus  Respiratory: Negative for cough, shortness of breath and wheezing  Cardiovascular: Negative for leg swelling  Gastrointestinal: Negative for abdominal pain, constipation, diarrhea, nausea and vomiting  Genitourinary: Negative for difficulty urinating and hematuria  Musculoskeletal: Negative for arthralgias, gait problem and myalgias  Skin: Negative for itching and rash  No new, changing, or concerning lesions  Neurological: Negative for extremity weakness, gait problem, headaches, light-headedness and numbness  Hematological: Negative for adenopathy          MEDICATIONS:    Current Outpatient Medications:     atorvastatin (LIPITOR) 20 mg tablet, TAKE ONE TABLET BY MOUTH EVERY DAY, Disp: 90 tablet, Rfl: 3    losartan (COZAAR) 100 MG tablet, TAKE ONE TABLET BY MOUTH EVERY DAY, Disp: 90 tablet, Rfl: 3    omeprazole (PRILOSEC) 40 MG capsule, Take 40 mg by mouth daily Every other day, Disp: , Rfl:     traMADol (Ultram) 50 mg tablet, Take 1 tablet (50 mg total) by mouth every 6 (six) hours as needed for moderate pain (Patient not taking: Reported on 3/2/2022 ), Disp: 12 tablet, Rfl: 0     ALLERGIES:  Allergies   Allergen Reactions    Neomycin-Polymyxin-Dexameth      Annotation - [id number]: skin peeled around eyes    Pollen Extract         ACTIVE PROBLEMS:  Patient Active Problem List   Diagnosis    HTN (hypertension)    Hypercholesterolemia    Numerous moles    GERD (gastroesophageal reflux disease)    Acute left flank pain    Left lower quadrant pain    Diverticulosis of colon    Suprapubic pain, acute    Dehydration    BMI 28 0-28 9,adult    Diverticulitis of large intestine without perforation or abscess without bleeding    Nephrolithiasis    COVID-19 virus infection    Left hip pain    Malignant melanoma of upper back ([facility] [address] )          PAST MEDICAL HISTORY:   Past Medical History:   Diagnosis Date    Allergic rhinitis     Resolved 1/14/2016     Allergy     Mild; spring and fall    Erectile dysfunction of nonorganic origin     Resolved 1/14/2016     GERD (gastroesophageal reflux disease)     Hyperlipidemia     Rosacea     Resolved 1/14/2016         PAST SURGICAL HISTORY:  Past Surgical History:   Procedure Laterality Date    COLONOSCOPY      FACIAL/NECK BIOPSY Right 1/9/2018    Procedure: CHEEK and LOWER EYELID BCC EXCISION AND COMPLEX CLOSURE;  Surgeon: Lefty Ordoñez MD;  [facility];  Service: Plastics    FL INJECTION LEFT HIP (NON ARTHROGRAM)  2/7/2022    LYMPH NODE BIOPSY Left 2/15/2022    Procedure: BIOPSY LYMPH NODE SENTINEL (NUC MED INJECTION AT 1300);   Surgeon: Mckenzie Tai MD;  [facility];  Service: Surgical Oncology    SKIN LESION EXCISION Left 2/15/2022    Procedure: UPPER BACK LESION WIDE EXCISION;  Surgeon: Mckenzie Tai MD;  [facility];  Service: Surgical Oncology    WISDOM TOOTH EXTRACTION          SOCIAL HISTORY:  Social History     Socioeconomic History    Marital status: /Civil Union     Spouse name: None    Number of children: None    Years of education: None  Highest education level: None   Occupational History    None   Tobacco Use    Smoking status: Light Tobacco Smoker     Types: Cigars    Smokeless tobacco: Never Used    Tobacco comment: cigar 6 times a year   Vaping Use    Vaping Use: Never used   Substance and Sexual Activity    Alcohol use: Yes     Alcohol/week: 1 0 standard drink     Types: 1 Shots of liquor per week     Comment: occasionally    Drug use: No    Sexual activity: None   Other Topics Concern    None   Social History Narrative    Former smoker - As per [employer]    Never used drugs - As per [employer]      Social Determinants of Health     Financial Resource Strain: Not on file   Food Insecurity: Not on file   Transportation Needs: Not on file   Physical Activity: Not on file   Stress: Not on file   Social Connections: Not on file   Intimate Partner Violence: Not on file   Housing Stability: Not on file        FAMILY HISTORY:  Family History   Problem Relation Age of Onset    Skin cancer Mother     No Known Problems Father            Objective    PHYSICAL EXAMINATION:   Blood pressure 124/84, pulse 76, temperature 98 3 °F (36 8 °C), temperature source Temporal, resp  rate 16, height 6' (1 829 m), weight 97 5 kg (215 lb), SpO2 98 %  Pain Score: 0-No pain      ECOG Performance Status      Most Recent Value   ECOG Performance Status 0 - Fully active, able to carry on all pre-disease performance without restriction            Physical Exam  Constitutional:       General: He is not in acute distress  Appearance: Normal appearance  He is not toxic-appearing  HENT:      Mouth/Throat:      Mouth: Mucous membranes are moist       Pharynx: Oropharynx is clear  Eyes:      General: No scleral icterus  Cardiovascular:      Rate and Rhythm: Normal rate and regular rhythm  Pulses: Normal pulses  Heart sounds: No murmur heard  No friction rub  No gallop      Pulmonary:      Effort: Pulmonary effort is normal  No respiratory distress  Breath sounds: Normal breath sounds  No wheezing or rales  Chest:   Breasts:      Right: No axillary adenopathy or supraclavicular adenopathy  Left: No axillary adenopathy or supraclavicular adenopathy  Abdominal:      General: There is no distension  Palpations: There is no mass  Tenderness: There is no abdominal tenderness  There is no rebound  Musculoskeletal:         General: No swelling or tenderness  Right lower leg: No edema  Left lower leg: No edema  Lymphadenopathy:      Head:      Right side of head: No submandibular, preauricular or posterior auricular adenopathy  Left side of head: No submandibular, preauricular or posterior auricular adenopathy  Cervical: No cervical adenopathy  Right cervical: No superficial or posterior cervical adenopathy  Left cervical: No superficial (healing surgical scar  Some scar tissue but no discrete nodularity or masses) or posterior cervical adenopathy  Upper Body:      Right upper body: No supraclavicular or axillary adenopathy  Left upper body: No supraclavicular or axillary adenopathy  Skin:     Findings: No rash  Comments: Well healed surgical scar  No evidence of recurrence at primary site  Neurological:      General: No focal deficit present  Mental Status: He is alert and oriented to person, place, and time  Psychiatric:         Mood and Affect: Mood normal          Behavior: Behavior normal          Thought Content: Thought content normal          Judgment: Judgment normal          I reviewed lab data in the chart      WBC   Date Value Ref Range Status   01/27/2022 5 32 4 31 - 10 16 Thousand/uL Final   11/04/2021 10 61 (H) 4 31 - 10 16 Thousand/uL Final     Hemoglobin   Date Value Ref Range Status   01/27/2022 15 0 12 0 - 17 0 g/dL Final   11/04/2021 15 1 12 0 - 17 0 g/dL Final     Platelets   Date Value Ref Range Status   01/27/2022 196 149 - 390 Thousands/uL Final 11/04/2021 230 149 - 390 Thousands/uL Final     MCV   Date Value Ref Range Status   01/27/2022 91 82 - 98 fL Final   11/04/2021 94 82 - 98 fL Final      Potassium   Date Value Ref Range Status   01/27/2022 4 2 3 5 - 5 3 mmol/L Final   11/04/2021 3 8 3 5 - 5 3 mmol/L Final     Chloride   Date Value Ref Range Status   01/27/2022 106 100 - 108 mmol/L Final   11/04/2021 100 100 - 108 mmol/L Final     CO2   Date Value Ref Range Status   01/27/2022 27 21 - 32 mmol/L Final   11/04/2021 27 21 - 32 mmol/L Final     BUN   Date Value Ref Range Status   01/27/2022 22 5 - 25 mg/dL Final   11/04/2021 16 5 - 25 mg/dL Final     Creatinine   Date Value Ref Range Status   01/27/2022 1 28 0 60 - 1 30 mg/dL Final     Comment:     Standardized to IDMS reference method   11/04/2021 1 54 (H) 0 60 - 1 30 mg/dL Final     Comment:     Standardized to IDMS reference method     Glucose   Date Value Ref Range Status   11/04/2021 94 65 - 140 mg/dL Final     Comment:     If the patient is fasting, the ADA then defines impaired fasting glucose as > 100 mg/dL and diabetes as > or equal to 123 mg/dL  Specimen collection should occur prior to Sulfasalazine administration due to the potential for falsely depressed results  Specimen collection should occur prior to Sulfapyridine administration due to the potential for falsely elevated results  Calcium   Date Value Ref Range Status   01/27/2022 9 9 8 3 - 10 1 mg/dL Final   11/04/2021 9 1 8 3 - 10 1 mg/dL Final     Albumin   Date Value Ref Range Status   01/27/2022 3 8 3 5 - 5 0 g/dL Final   11/04/2021 3 9 3 5 - 5 0 g/dL Final     Total Bilirubin   Date Value Ref Range Status   01/27/2022 0 73 0 20 - 1 00 mg/dL Final     Comment:     Use of this assay is not recommended for patients undergoing treatment with eltrombopag due to the potential for falsely elevated results     11/04/2021 1 28 (H) 0 20 - 1 00 mg/dL Final     Comment:     Use of this assay is not recommended for patients undergoing treatment with eltrombopag due to the potential for falsely elevated results  Alkaline Phosphatase   Date Value Ref Range Status   01/27/2022 65 46 - 116 U/L Final   11/04/2021 71 46 - 116 U/L Final     AST   Date Value Ref Range Status   01/27/2022 17 5 - 45 U/L Final     Comment:     Specimen collection should occur prior to Sulfasalazine administration due to the potential for falsely depressed results  11/04/2021 16 5 - 45 U/L Final     Comment:     Specimen collection should occur prior to Sulfasalazine administration due to the potential for falsely depressed results  ALT   Date Value Ref Range Status   01/27/2022 31 12 - 78 U/L Final     Comment:     Specimen collection should occur prior to Sulfasalazine and/or Sulfapyridine administration due to the potential for falsely depressed results  11/04/2021 32 12 - 78 U/L Final     Comment:     Specimen collection should occur prior to Sulfasalazine administration due to the potential for falsely depressed results  LD   Date Value Ref Range Status   01/27/2022 186 81 - 234 U/L Final     No results found for: TSH  No results found for: P0XQIFB   No results found for: FREET4      RECENT IMAGING:  Procedure: NM lymphatic melanoma    Addendum Date: 2/15/2022 Addendum:   ADDENDUM: Please note the dictation error documenting the site at left upper back  0 48 mCi Tc-99m sulfur colloid (0 6 cc volume) was administered intradermally in divided doses in the region of the patients left upper back melanoma  Result Date: 2/15/2022  Narrative: SENTINEL NODE LYMPHOSCINTIGRAPHY INDICATION:   Left upper back melanoma  FINDINGS: 0 48 mCi Tc-99m sulfur colloid (0 6 cc volume) was administered intradermally in divided doses in the region of the patients left upper pelvic melanoma  Scintigraphic images were obtained over the chest and neck   Initially 2 foci of intense tracer uptake identified in the left neck base followed by a  slightly inferior tracer uptake  Using scintigraphic guidance, the corresponding skin sites were marked with an indelible marker  The patient was transferred to the operating room in satisfactory condition  Impression: 1  [place] lymph node localized to left neck base to at least 3 foci of tracer uptake    Workstation performed: [id number]     Procedure: FL injection left hip (non arthrogram)    Result Date: 2/7/2022  Narrative: LEFT HIP INJECTION INDICATION:  Patient presents with prescription for left hip steroid injection  COMPARISON:  Plain films hips and pelvis left 1/8/2022  IMAGES:  3 FLUOROSCOPY TIME:  0 1 MFT  FINDINGS: After the risks and benefits of the procedure were thoroughly explained, informed consent was obtained  The patient was prepped and draped in the usual sterile fashion  1% lidocaine solution was utilized for local anesthesia  Intermittent fluoroscopy was utilized for placement a 20 gauge  3 5 inch spinal needle within the left hip joint  After positioning was confirmed with 3 mL of Omnipaque 300, a solution comprised of 1 mL 80 mg/mL Depomedrol, 2 mL of 0 25% Sensorcaine and 2 mL 1% Xylocaine  was injected into the left hip joint  The patient tolerated the procedure well  There were no complications  Impression: Post injection patient does admit to mid improvement of his typical left hip pain  Technically successful left hip steroid injection  Procedure was performed by ELZA Finch PA-C under the direct supervision of Dr Kaela Winkler  Workstation performed: [id number]             Assessment/Plan  Mr Vin Torrez is a 79 yr male with Stage IB melanoma here for follow up  1  Malignant melanoma of upper back ([address] )  2  Malignant melanoma of torso excluding breast ([address] )  He is status post definitive surgical treatment with wide local excision and sentinel lymph node biopsies  There was indeterminate sentinel lymph node identification, but sample lymph nodes were negative for melanoma    He will be monitored with ultrasounds of his left neck by Dr Natan Esparza  Orders have been placed for his upcoming ultrasound  He discussed that he has stage I B melanoma  We discussed that he is a low risk of recurrence that is more at risk for development of new primary melanomas  We discussed that we will continue with surveillance for melanoma recurrence as well as development of new primary melanoma  We recommend that he continues close follow-up with Dermatology  We will see him every 6 months for physical exams and blood work  We will monitor along with Dr Natan Esparza ultrasounds of his left neck for lymph node involvement  If clinically indicated we can obtain scans as needed  Orders placed for blood work for follow-up in 6 months  Scripts provided  He and his wife know to call with any issues or concerns prior to his next visit  - CBC and differential; Future  - Comprehensive metabolic panel; Future  - LD,Blood;  Future      Wyatt Ovalle MD, PhD

## 2022-05-27 ENCOUNTER — HOSPITAL ENCOUNTER (OUTPATIENT)
Dept: ULTRASOUND IMAGING | Facility: HOSPITAL | Age: 67
Discharge: HOME | End: 2022-05-27
Attending: STUDENT IN AN ORGANIZED HEALTH CARE EDUCATION/TRAINING PROGRAM
Payer: MEDICARE

## 2022-05-27 DIAGNOSIS — C43.59 MALIGNANT MELANOMA OF UPPER BACK (HCC): ICD-10-CM

## 2022-05-27 PROCEDURE — 76536 US EXAM OF HEAD AND NECK: CPT

## 2022-06-01 ENCOUNTER — OFFICE VISIT (OUTPATIENT)
Dept: SURGICAL ONCOLOGY | Facility: CLINIC | Age: 67
End: 2022-06-01
Payer: MEDICARE

## 2022-06-01 VITALS
RESPIRATION RATE: 17 BRPM | SYSTOLIC BLOOD PRESSURE: 130 MMHG | HEART RATE: 83 BPM | TEMPERATURE: 97.6 F | HEIGHT: 72 IN | WEIGHT: 213 LBS | OXYGEN SATURATION: 99 % | BODY MASS INDEX: 28.85 KG/M2 | DIASTOLIC BLOOD PRESSURE: 90 MMHG

## 2022-06-01 DIAGNOSIS — C43.59 MALIGNANT MELANOMA OF UPPER BACK (HCC): Primary | ICD-10-CM

## 2022-06-01 PROCEDURE — 99213 OFFICE O/P EST LOW 20 MIN: CPT | Performed by: STUDENT IN AN ORGANIZED HEALTH CARE EDUCATION/TRAINING PROGRAM

## 2022-06-01 NOTE — PROGRESS NOTES
Surgical Oncology Follow Up    [id number] [facility] Susie Navarro [place]  [facility] [id number]  [phone]    Evelyne Osgood  1955  [id number]    Diagnoses and all orders for this visit:    Malignant melanoma of upper back [facility])        Chief Complaint   Patient presents with    Follow-up       No follow-ups on file  Oncology History   Malignant melanoma of upper back ([facility] [address] )   12/20/2021 Biopsy    Left upper back Melamona: shave biopsy  Thickness: 0 8mm  Ulceration: none   Mitoses: 0  Margins: Approaches both lateral margins and extends to the deep margin      12/20/2021 -  Cancer Staged    Staging form: Melanoma of the Skin, AJCC 8th Edition  - Clinical stage from 12/20/2021: Stage IB (cT1b, cN0, cM0) - Signed by Vishnu Fraire MD on 1/27/2022 1/17/2022 Initial Diagnosis    Malignant melanoma of upper back ([facility] [address] )     2/15/2022 Surgery    A  Lymph node, sentinel, left neck #1:  Two lymph nodes, negative for metastatic melanoma (0/2)  B  Lymph node, sentinel, left neck #2:  Two lymph nodes, negative for metastatic melanoma (0/2)  C  Skin and soft tissue, upper left back, wide excision: Scar  Staging: T1bNx    Treatment history: s/p WLE and insufficient SLN bx 2/15/22  Current treatment: obs  Disease status:     History of Present Illness: This is a 80-year-old gentleman who presents in f/u after wide local excision and sentinel lymph node biopsy for a final path T1b melanoma of the back  The sentinel lymph node mapping in the operating room was insufficient, with no dominant site of elevated Johnny counts or blue dye  Now 3 mo postop  Recent US with no abnormal nodes  C/o numbness at left neck and clavicle  No pain or weakness  No new lesions to report  Review of Systems  Complete ROS Surg Onc:   Constitutional: The patient denies new or recent history of general fatigue, no recent weight loss, no change in appetite  Eyes: No complaints of visual problems, no scleral icterus  ENT: no complaints of ear pain, no hoarseness, no difficulty swallowing,  no tinnitus and no new masses in head, oral cavity, or neck  Cardiovascular: No complaints of chest pain, no palpitations, no ankle edema  Respiratory: No complaints of shortness of breath, no cough  Gastrointestinal: No complaints of jaundice, no bloody stools, no pale stools  Genitourinary: No complaints of dysuria, no hematuria, no nocturia, no frequent urination, no urethral discharge  Musculoskeletal: No complaints of weakness, paralysis, joint stiffness or arthralgias  Integumentary: No complaints of rash, no new lesions  Neurological: No complaints of convulsions, no seizures, no dizziness  Hematologic/Lymphatic: No complaints of easy bruising  Endocrine:  No hot or cold intolerance  No polydipsia, polyphagia, or polyuria  Allergy/immunology:  No environmental allergies  No food allergies  Not immunocompromised  Skin:  No pallor or rash  No wound          Patient Active Problem List   Diagnosis    HTN (hypertension)    Hypercholesterolemia    Numerous moles    GERD (gastroesophageal reflux disease)    Acute left flank pain    Left lower quadrant pain    Diverticulosis of colon    Suprapubic pain, acute    Dehydration    BMI 28 0-28 9,adult    Diverticulitis of large intestine without perforation or abscess without bleeding    Nephrolithiasis    COVID-19 virus infection    Left hip pain    Malignant melanoma of upper back [facility])     Past Medical History:   Diagnosis Date    Allergic rhinitis     Resolved 1/14/2016     Allergy     Mild; spring and fall    Erectile dysfunction of nonorganic origin     Resolved 1/14/2016     GERD (gastroesophageal reflux disease)     Hyperlipidemia     Rosacea     Resolved 1/14/2016      Past Surgical History:   Procedure Laterality Date    COLONOSCOPY      FACIAL/NECK BIOPSY Right 1/9/2018 Procedure: CHEEK and LOWER EYELID BCC EXCISION AND COMPLEX CLOSURE;  Surgeon: Kate Michaels MD;  [facility];  Service: Plastics    FL INJECTION LEFT HIP (NON ARTHROGRAM)  2/7/2022    LYMPH NODE BIOPSY Left 2/15/2022    Procedure: BIOPSY LYMPH NODE SENTINEL (NUC MED INJECTION AT 1300); Surgeon: Linda Fields MD;  [facility];  Service: Surgical Oncology    SKIN LESION EXCISION Left 2/15/2022    Procedure: UPPER BACK LESION WIDE EXCISION;  Surgeon: Linda Fields MD;  [facility];  Service: Surgical Oncology    WISDOM TOOTH EXTRACTION       Family History   Problem Relation Age of Onset    Skin cancer Mother     No Known Problems Father      Social History     Socioeconomic History    Marital status: /Civil Union     Spouse name: Not on file    Number of children: Not on file    Years of education: Not on file    Highest education level: Not on file   Occupational History    Not on file   Tobacco Use    Smoking status: Light Tobacco Smoker     Types: Cigars    Smokeless tobacco: Never Used    Tobacco comment: cigar 6 times a year   Vaping Use    Vaping Use: Never used   Substance and Sexual Activity    Alcohol use:  Yes     Alcohol/week: 1 0 standard drink     Types: 1 Shots of liquor per week     Comment: occasionally    Drug use: No    Sexual activity: Not on file   Other Topics Concern    Not on file   Social History Narrative    Former smoker - As per [employer]    Never used drugs - As per [employer]      Social Determinants of Health     Financial Resource Strain: Not on file   Food Insecurity: Not on file   Transportation Needs: Not on file   Physical Activity: Not on file   Stress: Not on file   Social Connections: Not on file   Intimate Partner Violence: Not on file   Housing Stability: Not on file       Current Outpatient Medications:     atorvastatin (LIPITOR) 20 mg tablet, TAKE ONE TABLET BY MOUTH EVERY DAY, Disp: 90 tablet, Rfl: 3   losartan (COZAAR) 100 MG tablet, TAKE ONE TABLET BY MOUTH EVERY DAY, Disp: 90 tablet, Rfl: 3    omeprazole (PriLOSEC) 40 MG capsule, Take 40 mg by mouth daily Every other day, Disp: , Rfl:     traMADol (Ultram) 50 mg tablet, Take 1 tablet (50 mg total) by mouth every 6 (six) hours as needed for moderate pain (Patient not taking: Reported on 3/2/2022 ), Disp: 12 tablet, Rfl: 0  Allergies   Allergen Reactions    Neomycin-Polymyxin-Dexameth      Annotation - [id number]: skin peeled around eyes    Pollen Extract      Vitals:    06/01/22 0928   BP: 130/90   Pulse: 83   Resp: 17   Temp: 97 6 °F (36 4 °C)   SpO2: 99%       Physical Exam  Constitutional: Patient is well-developed and well-nourished who appears the stated age in no acute distress  Patient is pleasant and talkative  HEENT:  Normocephalic  Sclerae are anicteric  Mucous membranes are moist  Neck is supple without adenopathy  Incision well-healed, No JVD  Chest: Equal chest rise, easy WOB  Cardiac: Heart is regular rate  Abdomen: Abdomen is non-tender, non-distended and without masses  Extremities: There is no clubbing or cyanosis  There is no edema  Symmetric  Neuro: Grossly nonfocal  Gait is normal      Lymphatic: No evidence of cervical adenopathy bilaterally  No evidence of axillary adenopathy bilaterally  No evidence of inguinal adenopathy bilaterally  Skin: Warm, anicteric  Incision flat with no abnormality  Psych:  Patient is pleasant and talkative  Pathology:  As above    Labs:  Reviewed in [employer] personally    Imaging  NM lymphatic melanoma    Addendum Date: 2/15/2022 Addendum:   ADDENDUM: Please note the dictation error documenting the site at left upper back  0 48 mCi Tc-99m sulfur colloid (0 6 cc volume) was administered intradermally in divided doses in the region of the patients left upper back melanoma  Result Date: 2/15/2022  Narrative: SENTINEL NODE LYMPHOSCINTIGRAPHY INDICATION:   Left upper back melanoma  FINDINGS: 0 48 mCi Tc-99m sulfur colloid (0 6 cc volume) was administered intradermally in divided doses in the region of the patients left upper pelvic melanoma  Scintigraphic images were obtained over the chest and neck  Initially 2 foci of intense tracer uptake identified in the left neck base followed by a  slightly inferior tracer uptake  Using scintigraphic guidance, the corresponding skin sites were marked with an indelible marker  The patient was transferred to the operating room in satisfactory condition  Impression: 1  [place] lymph node localized to left neck base to at least 3 foci of tracer uptake    Workstation performed: [id number]     FL injection left hip (non arthrogram)    Result Date: 2/7/2022  Narrative: LEFT HIP INJECTION INDICATION:  Patient presents with prescription for left hip steroid injection  COMPARISON:  Plain films hips and pelvis left 1/8/2022  IMAGES:  3 FLUOROSCOPY TIME:  0 1 MFT  FINDINGS: After the risks and benefits of the procedure were thoroughly explained, informed consent was obtained  The patient was prepped and draped in the usual sterile fashion  1% lidocaine solution was utilized for local anesthesia  Intermittent fluoroscopy was utilized for placement a 20 gauge  3 5 inch spinal needle within the left hip joint  After positioning was confirmed with 3 mL of Omnipaque 300, a solution comprised of 1 mL 80 mg/mL Depomedrol, 2 mL of 0 25% Sensorcaine and 2 mL 1% Xylocaine  was injected into the left hip joint  The patient tolerated the procedure well  There were no complications  Impression: Post injection patient does admit to mid improvement of his typical left hip pain  Technically successful left hip steroid injection  Procedure was performed by ELZA Delgado PA-C under the direct supervision of Dr Bran Ellington  Workstation performed: [id number]       I reviewed the above laboratory and imaging data      Discussion/Summary:   T1 B melanoma of the back excised 2/2022, with an insufficient sentinel lymph node performed  Recent neck US with no abnormalities  Doing well  Saw derm in April and will be seeing them again in July   Will RTC in Sep with repeat US and for H&P

## 2022-07-16 ENCOUNTER — TELEPHONE (OUTPATIENT)
Dept: INTERNAL MEDICINE CLINIC | Age: 67
End: 2022-07-16

## 2022-07-16 DIAGNOSIS — I10 ESSENTIAL HYPERTENSION: ICD-10-CM

## 2022-07-16 RX ORDER — LOSARTAN POTASSIUM 100 MG/1
100 TABLET ORAL DAILY
Qty: 90 TABLET | Refills: 0 | Status: CANCELLED | OUTPATIENT
Start: 2022-07-16

## 2022-07-18 NOTE — TELEPHONE ENCOUNTER
Last ov 11/11/22  Next ov  - LVM on cell phone and home phone to schedule a f/u visit with Dr Bobbi Beaver    Patient had cancelled appointment 2/21/22 and did not reschedule

## 2022-07-18 NOTE — TELEPHONE ENCOUNTER
Patient called back stating that the pharmacy said they did not have an open prescription for him   Wants to see if he can have another order placed for Losartan medication

## 2022-07-20 NOTE — TELEPHONE ENCOUNTER
Called patient and LVM to call back and schedule an appointment with Dr Rachel Herrmann  We can refill his medication when he has a pending appointment

## 2022-07-21 RX ORDER — LOSARTAN POTASSIUM 100 MG/1
100 TABLET ORAL DAILY
Qty: 30 TABLET | Refills: 0 | Status: SHIPPED | OUTPATIENT
Start: 2022-07-21 | End: 2022-07-23 | Stop reason: SDUPTHER

## 2022-07-22 ENCOUNTER — RA CDI HCC (OUTPATIENT)
Dept: OTHER | Facility: HOSPITAL | Age: 67
End: 2022-07-22

## 2022-07-22 NOTE — PROGRESS NOTES
[address]  coding opportunities       Chart reviewed, no opportunity found: CHART REVIEWED, NO OPPORTUNITY FOUND        Patients Insurance     Medicare Insurance: Medicare

## 2022-07-23 DIAGNOSIS — I10 ESSENTIAL HYPERTENSION: ICD-10-CM

## 2022-07-25 RX ORDER — LOSARTAN POTASSIUM 100 MG/1
100 TABLET ORAL DAILY
Qty: 30 TABLET | Refills: 0 | Status: SHIPPED | OUTPATIENT
Start: 2022-07-25 | End: 2022-08-10 | Stop reason: SDUPTHER

## 2022-08-06 DIAGNOSIS — E78.2 MIXED HYPERLIPIDEMIA: ICD-10-CM

## 2022-08-08 RX ORDER — ATORVASTATIN CALCIUM 20 MG/1
20 TABLET, FILM COATED ORAL DAILY
Qty: 90 TABLET | Refills: 1 | Status: SHIPPED | OUTPATIENT
Start: 2022-08-08

## 2022-08-10 ENCOUNTER — OFFICE VISIT (OUTPATIENT)
Dept: INTERNAL MEDICINE CLINIC | Facility: OTHER | Age: 67
End: 2022-08-10
Payer: MEDICARE

## 2022-08-10 VITALS
HEIGHT: 71 IN | WEIGHT: 212 LBS | DIASTOLIC BLOOD PRESSURE: 78 MMHG | OXYGEN SATURATION: 98 % | SYSTOLIC BLOOD PRESSURE: 110 MMHG | BODY MASS INDEX: 29.68 KG/M2 | HEART RATE: 72 BPM | TEMPERATURE: 98.1 F

## 2022-08-10 DIAGNOSIS — E78.00 HYPERCHOLESTEROLEMIA: ICD-10-CM

## 2022-08-10 DIAGNOSIS — K57.30 DIVERTICULOSIS OF COLON: Primary | ICD-10-CM

## 2022-08-10 DIAGNOSIS — I10 ESSENTIAL HYPERTENSION: ICD-10-CM

## 2022-08-10 DIAGNOSIS — C43.59 MALIGNANT MELANOMA OF UPPER BACK (HCC): ICD-10-CM

## 2022-08-10 DIAGNOSIS — N20.0 NEPHROLITHIASIS: ICD-10-CM

## 2022-08-10 DIAGNOSIS — R05.9 COUGH: ICD-10-CM

## 2022-08-10 DIAGNOSIS — D22.9 NUMEROUS MOLES: ICD-10-CM

## 2022-08-10 DIAGNOSIS — I10 PRIMARY HYPERTENSION: ICD-10-CM

## 2022-08-10 DIAGNOSIS — N52.9 ERECTILE DYSFUNCTION, UNSPECIFIED ERECTILE DYSFUNCTION TYPE: ICD-10-CM

## 2022-08-10 PROBLEM — E86.0 DEHYDRATION: Status: RESOLVED | Noted: 2021-11-04 | Resolved: 2022-08-10

## 2022-08-10 PROBLEM — M25.552 LEFT HIP PAIN: Status: RESOLVED | Noted: 2021-12-28 | Resolved: 2022-08-10

## 2022-08-10 PROBLEM — R10.32 LEFT LOWER QUADRANT PAIN: Status: RESOLVED | Noted: 2021-11-04 | Resolved: 2022-08-10

## 2022-08-10 PROBLEM — U07.1 COVID-19 VIRUS INFECTION: Status: RESOLVED | Noted: 2021-12-28 | Resolved: 2022-08-10

## 2022-08-10 PROCEDURE — 99214 OFFICE O/P EST MOD 30 MIN: CPT | Performed by: INTERNAL MEDICINE

## 2022-08-10 RX ORDER — TADALAFIL 20 MG/1
20 TABLET ORAL DAILY PRN
Qty: 10 TABLET | Refills: 0 | Status: SHIPPED | OUTPATIENT
Start: 2022-08-10 | End: 2022-09-07

## 2022-08-10 RX ORDER — LOSARTAN POTASSIUM 100 MG/1
100 TABLET ORAL DAILY
Qty: 90 TABLET | Refills: 1 | Status: SHIPPED | OUTPATIENT
Start: 2022-08-10

## 2022-08-10 NOTE — PROGRESS NOTES
Assessment/Plan:     Diagnoses and all orders for this visit:    Diverticulosis of colon    Essential hypertension  -     losartan (COZAAR) 100 MG tablet; Take 1 tablet (100 mg total) by mouth daily    Hypercholesterolemia  Follow-up the lipid panel  Primary hypertension  Hypertension is controlled  Malignant melanoma of upper back ([address] )  Followed up by the Dermatology  Nephrolithiasis    Numerous moles  Followed up by Dermatology  Cough  -     XR chest pa & lateral; Future        BMI Counseling: Body mass index is 29 57 kg/m²  The BMI is above normal  Nutrition recommendations include decreasing portion sizes, encouraging healthy choices of fruits and vegetables and moderation in carbohydrate intake  Exercise recommendations include moderate physical activity 150 minutes/week  Rationale for BMI follow-up plan is due to patient being overweight or obese  M*Modal software was used to dictate this note  It may contain errors with dictating incorrect words or incorrect spelling  Please contact the provider directly with any questions  Subjective:   Chief Complaint   Patient presents with    Follow-up     Pt is here for a follow up  Review labs  Pt has melanoma mole removed and lymph nodes removed (bx negative)  Surgery melanoma 2/15  Dr Sapna Harris surgeon  Dr Franky Jackson doctor  Patient ID: Anna Garcia is a 79 y o  male  HPI  Very pleasant the 79 years young gentleman who is here today for the regular follow-up he is doing well review of system is essentially unremarkable except for his the recent problems with the cough which is going on for couple of months he is having some postnasal drip and then he coughs he does not have any fever or chills he does not have any nausea vomiting or diarrhea he does not have any problem with the swallowing cough is not productive    I recommend him to get the chest x-ray and if the cough does not get better then most likely we will need CT scan also I told him to take Zyrtec 10 mg once a day for allergies for postnasal drip  Hypercholesterolemia will follow-up with the lipid panel  Malignant melanoma under the care of dermatologist  Gastroesophageal reflux problem stable  Erectile dysfunction Cialis as needed  The following portions of the patient's history were reviewed and updated as appropriate: allergies, current medications, past family history, past medical history, past social history, past surgical history and problem list     Review of Systems   Constitutional: Negative for appetite change, fatigue and fever  HENT: Positive for postnasal drip  Negative for congestion, ear pain, hearing loss, nosebleeds, sneezing, tinnitus and voice change  Eyes: Negative for pain, discharge and redness  Respiratory: Positive for cough  Negative for chest tightness and wheezing  Cardiovascular: Negative for chest pain, palpitations and leg swelling  Gastrointestinal: Negative for abdominal pain, blood in stool, constipation, diarrhea, nausea and vomiting  Genitourinary: Negative for difficulty urinating, dysuria, hematuria and urgency  Musculoskeletal: Negative for arthralgias, back pain, gait problem and joint swelling  Skin: Negative for rash and wound  Allergic/Immunologic: Negative for environmental allergies  Neurological: Negative for dizziness, tremors, seizures, weakness, light-headedness and numbness  Hematological: Negative for adenopathy  Does not bruise/bleed easily  Psychiatric/Behavioral: Negative for behavioral problems and confusion  The patient is not nervous/anxious            Past Medical History:   Diagnosis Date    Allergic rhinitis     Resolved 1/14/2016     Allergy     Mild; spring and fall    Erectile dysfunction of nonorganic origin     Resolved 1/14/2016     GERD (gastroesophageal reflux disease)     Hyperlipidemia     Rosacea     Resolved 1/14/2016          Current Outpatient Medications:     atorvastatin (LIPITOR) 20 mg tablet, Take 1 tablet (20 mg total) by mouth daily, Disp: 90 tablet, Rfl: 1    losartan (COZAAR) 100 MG tablet, Take 1 tablet (100 mg total) by mouth daily, Disp: 90 tablet, Rfl: 1    omeprazole (PriLOSEC) 40 MG capsule, Take 40 mg by mouth daily Every other day, Disp: , Rfl:     Allergies   Allergen Reactions    Neomycin-Polymyxin-Dexameth      Annotation - [id number]: skin peeled around eyes    Pollen Extract        Social History   Past Surgical History:   Procedure Laterality Date    COLONOSCOPY      FACIAL/NECK BIOPSY Right 1/9/2018    Procedure: CHEEK and LOWER EYELID BCC EXCISION AND COMPLEX CLOSURE;  Surgeon: Deuce Quintana MD;  [facility];  Service: Plastics    FL INJECTION LEFT HIP (NON ARTHROGRAM)  2/7/2022    LYMPH NODE BIOPSY Left 2/15/2022    Procedure: BIOPSY LYMPH NODE SENTINEL (NUC MED INJECTION AT 1300); Surgeon: Bryce Dwyer MD;  [facility];  Service: Surgical Oncology    SKIN LESION EXCISION Left 2/15/2022    Procedure: UPPER BACK LESION WIDE EXCISION;  Surgeon: Bryce Dwyer MD;  [facility];  Service: Surgical Oncology    WISDOM TOOTH EXTRACTION       Family History   Problem Relation Age of Onset    Skin cancer Mother     No Known Problems Father        Objective:  /78 (BP Location: Left arm, Patient Position: Sitting, Cuff Size: Adult)   Pulse 72   Temp 98 1 °F (36 7 °C) (Tympanic)   Ht 5' 11" (1 803 m)   Wt 96 2 kg (212 lb)   SpO2 98% Comment: ra  BMI 29 57 kg/m²        Physical Exam  Constitutional:       Appearance: Normal appearance  He is well-developed  HENT:      Right Ear: External ear normal    Eyes:      Conjunctiva/sclera: Conjunctivae normal       Pupils: Pupils are equal, round, and reactive to light  Neck:      Thyroid: No thyromegaly  Vascular: No JVD  Cardiovascular:      Rate and Rhythm: Normal rate and regular rhythm  Heart sounds: Normal heart sounds     Pulmonary:      Breath sounds: Normal breath sounds  Abdominal:      General: Bowel sounds are normal       Palpations: Abdomen is soft  Musculoskeletal:         General: Normal range of motion  Cervical back: Normal range of motion  Lymphadenopathy:      Cervical: No cervical adenopathy  Skin:     General: Skin is dry  Comments: Multiple moles   Neurological:      General: No focal deficit present  Mental Status: He is alert and oriented to person, place, and time  Deep Tendon Reflexes: Reflexes are normal and symmetric     Psychiatric:         Mood and Affect: Mood normal          Behavior: Behavior normal

## 2022-08-17 ENCOUNTER — TELEPHONE (OUTPATIENT)
Dept: HEMATOLOGY ONCOLOGY | Facility: CLINIC | Age: 67
End: 2022-08-17

## 2022-08-20 DIAGNOSIS — I10 ESSENTIAL HYPERTENSION: ICD-10-CM

## 2022-08-20 RX ORDER — LOSARTAN POTASSIUM 100 MG/1
100 TABLET ORAL DAILY
Qty: 90 TABLET | Refills: 0 | Status: CANCELLED | OUTPATIENT
Start: 2022-08-20

## 2022-08-22 NOTE — TELEPHONE ENCOUNTER
Patient calling to see why it was denied, I called the pharmacy to confirm that they have it they do and its fill and ready to be picked up   I called and LMOM for patient

## 2022-08-22 NOTE — TELEPHONE ENCOUNTER
LMOM losartan was filled on 8/10 with 1 refill asked the patient to call On license of [facility]

## 2022-08-30 ENCOUNTER — TELEPHONE (OUTPATIENT)
Dept: SURGICAL ONCOLOGY | Facility: CLINIC | Age: 67
End: 2022-08-30

## 2022-08-30 ENCOUNTER — TELEPHONE (OUTPATIENT)
Dept: HEMATOLOGY ONCOLOGY | Facility: CLINIC | Age: 67
End: 2022-08-30

## 2022-08-30 NOTE — TELEPHONE ENCOUNTER
Left message to notify pt that Dr Yoli Isaacs will not be in the office on 9/6  Offered appt the following day 9/7  Instructed to call back to schedule

## 2022-09-01 ENCOUNTER — TELEPHONE (OUTPATIENT)
Dept: SURGICAL ONCOLOGY | Facility: CLINIC | Age: 67
End: 2022-09-01

## 2022-09-01 NOTE — TELEPHONE ENCOUNTER
Tc to pt and left message regarding f/u with Dr Yoel Kimball on 9/6  Explained to him that we need to cancel this appt as Dr Yoel Kimball will not be in the office that day  Offered appt the following day Wednesday 9/7 or Thursday 9/8  Instructed pt to call back to reschedule appt  3rd attempt to reach pt

## 2022-09-06 ENCOUNTER — TELEPHONE (OUTPATIENT)
Dept: HEMATOLOGY ONCOLOGY | Facility: CLINIC | Age: 67
End: 2022-09-06

## 2022-09-06 ENCOUNTER — APPOINTMENT (OUTPATIENT)
Dept: LAB | Facility: HOSPITAL | Age: 67
End: 2022-09-06
Payer: MEDICARE

## 2022-09-06 DIAGNOSIS — C43.59 MALIGNANT MELANOMA OF TORSO EXCLUDING BREAST (HCC): ICD-10-CM

## 2022-09-06 LAB
ALBUMIN SERPL BCP-MCNC: 4 G/DL (ref 3.5–5)
ALP SERPL-CCNC: 56 U/L (ref 46–116)
ALT SERPL W P-5'-P-CCNC: 29 U/L (ref 12–78)
ANION GAP SERPL CALCULATED.3IONS-SCNC: 10 MMOL/L (ref 4–13)
AST SERPL W P-5'-P-CCNC: 18 U/L (ref 5–45)
BASOPHILS # BLD AUTO: 0.03 THOUSANDS/ΜL (ref 0–0.1)
BASOPHILS NFR BLD AUTO: 1 % (ref 0–1)
BILIRUB SERPL-MCNC: 1.08 MG/DL (ref 0.2–1)
BUN SERPL-MCNC: 19 MG/DL (ref 5–25)
CALCIUM SERPL-MCNC: 9 MG/DL (ref 8.3–10.1)
CHLORIDE SERPL-SCNC: 104 MMOL/L (ref 96–108)
CO2 SERPL-SCNC: 26 MMOL/L (ref 21–32)
CREAT SERPL-MCNC: 1.13 MG/DL (ref 0.6–1.3)
EOSINOPHIL # BLD AUTO: 0.04 THOUSAND/ΜL (ref 0–0.61)
EOSINOPHIL NFR BLD AUTO: 1 % (ref 0–6)
ERYTHROCYTE [DISTWIDTH] IN BLOOD BY AUTOMATED COUNT: 12.6 % (ref 11.6–15.1)
GFR SERPL CREATININE-BSD FRML MDRD: 66 ML/MIN/1.73SQ M
GLUCOSE P FAST SERPL-MCNC: 102 MG/DL (ref 65–99)
HCT VFR BLD AUTO: 46.2 % (ref 36.5–49.3)
HGB BLD-MCNC: 15.4 G/DL (ref 12–17)
IMM GRANULOCYTES # BLD AUTO: 0.02 THOUSAND/UL (ref 0–0.2)
IMM GRANULOCYTES NFR BLD AUTO: 0 % (ref 0–2)
LDH SERPL-CCNC: 157 U/L (ref 81–234)
LYMPHOCYTES # BLD AUTO: 1.43 THOUSANDS/ΜL (ref 0.6–4.47)
LYMPHOCYTES NFR BLD AUTO: 22 % (ref 14–44)
MCH RBC QN AUTO: 31.4 PG (ref 26.8–34.3)
MCHC RBC AUTO-ENTMCNC: 33.3 G/DL (ref 31.4–37.4)
MCV RBC AUTO: 94 FL (ref 82–98)
MONOCYTES # BLD AUTO: 0.65 THOUSAND/ΜL (ref 0.17–1.22)
MONOCYTES NFR BLD AUTO: 10 % (ref 4–12)
NEUTROPHILS # BLD AUTO: 4.37 THOUSANDS/ΜL (ref 1.85–7.62)
NEUTS SEG NFR BLD AUTO: 66 % (ref 43–75)
NRBC BLD AUTO-RTO: 0 /100 WBCS
PLATELET # BLD AUTO: 178 THOUSANDS/UL (ref 149–390)
PMV BLD AUTO: 10.1 FL (ref 8.9–12.7)
POTASSIUM SERPL-SCNC: 3.7 MMOL/L (ref 3.5–5.3)
PROT SERPL-MCNC: 7.1 G/DL (ref 6.4–8.4)
RBC # BLD AUTO: 4.9 MILLION/UL (ref 3.88–5.62)
SODIUM SERPL-SCNC: 140 MMOL/L (ref 135–147)
WBC # BLD AUTO: 6.54 THOUSAND/UL (ref 4.31–10.16)

## 2022-09-06 PROCEDURE — 80053 COMPREHEN METABOLIC PANEL: CPT

## 2022-09-06 PROCEDURE — 85025 COMPLETE CBC W/AUTO DIFF WBC: CPT

## 2022-09-06 PROCEDURE — 36415 COLL VENOUS BLD VENIPUNCTURE: CPT

## 2022-09-06 PROCEDURE — 83615 LACTATE (LD) (LDH) ENZYME: CPT

## 2022-09-07 ENCOUNTER — OFFICE VISIT (OUTPATIENT)
Dept: HEMATOLOGY ONCOLOGY | Facility: CLINIC | Age: 67
End: 2022-09-07
Payer: MEDICARE

## 2022-09-07 VITALS
RESPIRATION RATE: 14 BRPM | OXYGEN SATURATION: 97 % | DIASTOLIC BLOOD PRESSURE: 72 MMHG | HEART RATE: 68 BPM | BODY MASS INDEX: 28.71 KG/M2 | WEIGHT: 212 LBS | TEMPERATURE: 97.8 F | HEIGHT: 72 IN | SYSTOLIC BLOOD PRESSURE: 102 MMHG

## 2022-09-07 DIAGNOSIS — C43.59 MALIGNANT MELANOMA OF UPPER BACK (HCC): Primary | ICD-10-CM

## 2022-09-07 DIAGNOSIS — C43.59 MALIGNANT MELANOMA OF TORSO EXCLUDING BREAST (HCC): ICD-10-CM

## 2022-09-07 PROCEDURE — 99213 OFFICE O/P EST LOW 20 MIN: CPT | Performed by: INTERNAL MEDICINE

## 2022-09-07 NOTE — PROGRESS NOTES
[facility]  [address] [address]  [phone]     Date of Visit: 9/7/2022  Name: Kathrine Aquino   YOB: 1955        Subjective    VISIT DIAGNOSIS:  Diagnoses and all orders for this visit:    Malignant melanoma of upper back ([address] )  -     CBC and differential; Future  -     Comprehensive metabolic panel; Future  -     LD,Blood; Future    Malignant melanoma of torso excluding breast (HCC)  -     CBC and differential; Future  -     Comprehensive metabolic panel; Future  -     LD,Blood; Future        Oncology History   Malignant melanoma of upper back ([address] )   12/20/2021 Biopsy    Left upper back Melamona: shave biopsy  Thickness: 0 8mm  Ulceration: none   Mitoses: 0  Margins: Approaches both lateral margins and extends to the deep margin      12/20/2021 -  Cancer Staged    Staging form: Melanoma of the Skin, AJCC 8th Edition  - Clinical stage from 12/20/2021: Stage IB (cT1b, cN0, cM0) - Signed by Brianda Palmer MD on 1/27/2022 1/17/2022 Initial Diagnosis    Malignant melanoma of upper back ([address] )     2/15/2022 Surgery    A  Lymph node, sentinel, left neck #1:  Two lymph nodes, negative for metastatic melanoma (0/2)  B  Lymph node, sentinel, left neck #2:  Two lymph nodes, negative for metastatic melanoma (0/2)  C  Skin and soft tissue, upper left back, wide excision: Scar  Cancer Staging  Malignant melanoma of upper back [facility])  Staging form: Melanoma of the Skin, AJCC 8th Edition  - Clinical stage from 12/20/2021: Stage IB (cT1b, cN0, cM0) - Signed by Brianda Palmer MD on 1/27/2022       HISTORY OF PRESENT ILLNESS: Kathrine Aquino is a 79 y o  male  who has a history of stage I B melanoma here for follow-up and surveillance  Doing well with no issues or complaints  Recently saw Dermatology with no new, changing, concerning skin lesions  Denies any new lymphadenopathy at this time    He continues to follow with primary care  He will have GI follow-up as well due to history of diverticulitis with recent flare with wall with antibiotics  Time no new issues or complaints at this time  Due for US and follow up with Dr Elmer Wolf now  REVIEW OF SYSTEMS:  Review of Systems   Constitutional: Negative for appetite change, fatigue, fever and unexpected weight change  HENT:   Negative for lump/mass  Eyes: Negative for icterus  Respiratory: Negative for cough, shortness of breath and wheezing  Cardiovascular: Negative for leg swelling  Gastrointestinal: Negative for abdominal pain, constipation, diarrhea, nausea and vomiting  Genitourinary: Negative for difficulty urinating and hematuria  Musculoskeletal: Negative for arthralgias, gait problem and myalgias  Skin: Negative for itching and rash  No new, changing, or concerning lesions  Neurological: Negative for extremity weakness, gait problem, headaches, light-headedness and numbness  Hematological: Negative for adenopathy          MEDICATIONS:    Current Outpatient Medications:     atorvastatin (LIPITOR) 20 mg tablet, Take 1 tablet (20 mg total) by mouth daily, Disp: 90 tablet, Rfl: 1    losartan (COZAAR) 100 MG tablet, Take 1 tablet (100 mg total) by mouth daily, Disp: 90 tablet, Rfl: 1    omeprazole (PriLOSEC) 40 MG capsule, Take 40 mg by mouth daily Every other day, Disp: , Rfl:      ALLERGIES:  Allergies   Allergen Reactions    Neomycin-Polymyxin-Dexameth      Annotation - [id number]: skin peeled around eyes    Pollen Extract         ACTIVE PROBLEMS:  Patient Active Problem List   Diagnosis    HTN (hypertension)    Hypercholesterolemia    Numerous moles    GERD (gastroesophageal reflux disease)    Acute left flank pain    Diverticulosis of colon    Suprapubic pain, acute    BMI 28 0-28 9,adult    Diverticulitis of large intestine without perforation or abscess without bleeding    Nephrolithiasis    Malignant melanoma of upper back [facility])          PAST MEDICAL HISTORY:   Past Medical History:   Diagnosis Date    Allergic rhinitis     Resolved 1/14/2016     Allergy     Mild; spring and fall    Erectile dysfunction of nonorganic origin     Resolved 1/14/2016     GERD (gastroesophageal reflux disease)     Hyperlipidemia     Rosacea     Resolved 1/14/2016         PAST SURGICAL HISTORY:  Past Surgical History:   Procedure Laterality Date    COLONOSCOPY      FACIAL/NECK BIOPSY Right 1/9/2018    Procedure: CHEEK and LOWER EYELID BCC EXCISION AND COMPLEX CLOSURE;  Surgeon: Marily See MD;  [facility];  Service: Plastics    FL INJECTION LEFT HIP (NON ARTHROGRAM)  2/7/2022    LYMPH NODE BIOPSY Left 2/15/2022    Procedure: BIOPSY LYMPH NODE SENTINEL (NUC MED INJECTION AT 1300); Surgeon: Rene Sears MD;  [facility];  Service: Surgical Oncology    SKIN LESION EXCISION Left 2/15/2022    Procedure: UPPER BACK LESION WIDE EXCISION;  Surgeon: Rene Sears MD;  [facility];  Service: Surgical Oncology    WISDOM TOOTH EXTRACTION          SOCIAL HISTORY:  Social History     Socioeconomic History    Marital status: /Civil Union     Spouse name: None    Number of children: None    Years of education: None    Highest education level: None   Occupational History    None   Tobacco Use    Smoking status: Light Tobacco Smoker     Types: Cigars    Smokeless tobacco: Never Used    Tobacco comment: cigar 6 times a year   Vaping Use    Vaping Use: Never used   Substance and Sexual Activity    Alcohol use:  Yes     Alcohol/week: 1 0 standard drink     Types: 1 Shots of liquor per week     Comment: occasionally    Drug use: No    Sexual activity: None   Other Topics Concern    None   Social History Narrative    Former smoker - As per [employer]    Never used drugs - As per [employer]      Social Determinants of Health     Financial Resource Strain: Not on file   Food Insecurity: Not on file Transportation Needs: Not on file   Physical Activity: Not on file   Stress: Not on file   Social Connections: Not on file   Intimate Partner Violence: Not on file   Housing Stability: Not on file        FAMILY HISTORY:  Family History   Problem Relation Age of Onset    Skin cancer Mother     No Known Problems Father            Objective    PHYSICAL EXAMINATION:   Blood pressure 102/72, pulse 68, temperature 97 8 °F (36 6 °C), resp  rate 14, height 6' (1 829 m), weight 96 2 kg (212 lb), SpO2 97 %  ECOG Performance Status    Flowsheet Row Most Recent Value   ECOG Performance Status 0 - Fully active, able to carry on all pre-disease performance without restriction             Physical Exam  Constitutional:       General: He is not in acute distress  Appearance: Normal appearance  He is not toxic-appearing  HENT:      Mouth/Throat:      Mouth: Mucous membranes are moist       Pharynx: Oropharynx is clear  Eyes:      General: No scleral icterus  Cardiovascular:      Rate and Rhythm: Normal rate and regular rhythm  Pulses: Normal pulses  Heart sounds: No murmur heard  No friction rub  No gallop  Pulmonary:      Effort: Pulmonary effort is normal  No respiratory distress  Breath sounds: Normal breath sounds  No wheezing or rales  Chest:   Breasts:      Right: No axillary adenopathy or supraclavicular adenopathy  Left: No axillary adenopathy or supraclavicular adenopathy  Abdominal:      General: There is no distension  Palpations: There is no mass  Tenderness: There is no abdominal tenderness  There is no rebound  Musculoskeletal:         General: No swelling or tenderness  Right lower leg: No edema  Left lower leg: No edema  Lymphadenopathy:      Head:      Right side of head: No submandibular, preauricular or posterior auricular adenopathy  Left side of head: No submandibular, preauricular or posterior auricular adenopathy        Cervical: No cervical adenopathy  Right cervical: No superficial or posterior cervical adenopathy  Left cervical: No superficial or posterior cervical adenopathy  Upper Body:      Right upper body: No supraclavicular or axillary adenopathy  Left upper body: No supraclavicular or axillary adenopathy  Skin:     Findings: No rash  Comments: Well healed surgical scar  No evidence of recurrence at primary site  Neurological:      General: No focal deficit present  Mental Status: He is alert and oriented to person, place, and time  Psychiatric:         Mood and Affect: Mood normal          Behavior: Behavior normal          Thought Content: Thought content normal          Judgment: Judgment normal          I reviewed lab data in the chart      WBC   Date Value Ref Range Status   09/06/2022 6 54 4 31 - 10 16 Thousand/uL Final   01/27/2022 5 32 4 31 - 10 16 Thousand/uL Final   11/04/2021 10 61 (H) 4 31 - 10 16 Thousand/uL Final     Hemoglobin   Date Value Ref Range Status   09/06/2022 15 4 12 0 - 17 0 g/dL Final   01/27/2022 15 0 12 0 - 17 0 g/dL Final   11/04/2021 15 1 12 0 - 17 0 g/dL Final     Platelets   Date Value Ref Range Status   09/06/2022 178 149 - 390 Thousands/uL Final   01/27/2022 196 149 - 390 Thousands/uL Final   11/04/2021 230 149 - 390 Thousands/uL Final     MCV   Date Value Ref Range Status   09/06/2022 94 82 - 98 fL Final   01/27/2022 91 82 - 98 fL Final   11/04/2021 94 82 - 98 fL Final      Potassium   Date Value Ref Range Status   09/06/2022 3 7 3 5 - 5 3 mmol/L Final   01/27/2022 4 2 3 5 - 5 3 mmol/L Final   11/04/2021 3 8 3 5 - 5 3 mmol/L Final     Chloride   Date Value Ref Range Status   09/06/2022 104 96 - 108 mmol/L Final   01/27/2022 106 100 - 108 mmol/L Final   11/04/2021 100 100 - 108 mmol/L Final     CO2   Date Value Ref Range Status   09/06/2022 26 21 - 32 mmol/L Final   01/27/2022 27 21 - 32 mmol/L Final   11/04/2021 27 21 - 32 mmol/L Final     BUN   Date Value Ref Range Status   09/06/2022 19 5 - 25 mg/dL Final   01/27/2022 22 5 - 25 mg/dL Final   11/04/2021 16 5 - 25 mg/dL Final     Creatinine   Date Value Ref Range Status   09/06/2022 1 13 0 60 - 1 30 mg/dL Final     Comment:     Standardized to IDMS reference method   01/27/2022 1 28 0 60 - 1 30 mg/dL Final     Comment:     Standardized to IDMS reference method   11/04/2021 1 54 (H) 0 60 - 1 30 mg/dL Final     Comment:     Standardized to IDMS reference method     Glucose   Date Value Ref Range Status   11/04/2021 94 65 - 140 mg/dL Final     Comment:     If the patient is fasting, the ADA then defines impaired fasting glucose as > 100 mg/dL and diabetes as > or equal to 123 mg/dL  Specimen collection should occur prior to Sulfasalazine administration due to the potential for falsely depressed results  Specimen collection should occur prior to Sulfapyridine administration due to the potential for falsely elevated results  Calcium   Date Value Ref Range Status   09/06/2022 9 0 8 3 - 10 1 mg/dL Final   01/27/2022 9 9 8 3 - 10 1 mg/dL Final   11/04/2021 9 1 8 3 - 10 1 mg/dL Final     Albumin   Date Value Ref Range Status   09/06/2022 4 0 3 5 - 5 0 g/dL Final   01/27/2022 3 8 3 5 - 5 0 g/dL Final   11/04/2021 3 9 3 5 - 5 0 g/dL Final     Total Bilirubin   Date Value Ref Range Status   09/06/2022 1 08 (H) 0 20 - 1 00 mg/dL Final     Comment:     Use of this assay is not recommended for patients undergoing treatment with eltrombopag due to the potential for falsely elevated results  01/27/2022 0 73 0 20 - 1 00 mg/dL Final     Comment:     Use of this assay is not recommended for patients undergoing treatment with eltrombopag due to the potential for falsely elevated results  11/04/2021 1 28 (H) 0 20 - 1 00 mg/dL Final     Comment:     Use of this assay is not recommended for patients undergoing treatment with eltrombopag due to the potential for falsely elevated results       Alkaline Phosphatase   Date Value Ref Range Status 09/06/2022 56 46 - 116 U/L Final   01/27/2022 65 46 - 116 U/L Final   11/04/2021 71 46 - 116 U/L Final     AST   Date Value Ref Range Status   09/06/2022 18 5 - 45 U/L Final     Comment:     Specimen collection should occur prior to Sulfasalazine administration due to the potential for falsely depressed results  01/27/2022 17 5 - 45 U/L Final     Comment:     Specimen collection should occur prior to Sulfasalazine administration due to the potential for falsely depressed results  11/04/2021 16 5 - 45 U/L Final     Comment:     Specimen collection should occur prior to Sulfasalazine administration due to the potential for falsely depressed results  ALT   Date Value Ref Range Status   09/06/2022 29 12 - 78 U/L Final     Comment:     Specimen collection should occur prior to Sulfasalazine administration due to the potential for falsely depressed results  01/27/2022 31 12 - 78 U/L Final     Comment:     Specimen collection should occur prior to Sulfasalazine and/or Sulfapyridine administration due to the potential for falsely depressed results  11/04/2021 32 12 - 78 U/L Final     Comment:     Specimen collection should occur prior to Sulfasalazine administration due to the potential for falsely depressed results  LD   Date Value Ref Range Status   09/06/2022 157 81 - 234 U/L Final   01/27/2022 186 81 - 234 U/L Final     No results found for: TSH  No results found for: G5XFTUP   No results found for: FREET4      RECENT IMAGING:  Due for US now of LN basin         Assessment/Plan  Mr Alvaro Pendleton is a 79 yr male with Stage IB melanoma here for follow up ans surveillance    1  Malignant melanoma of upper back ([address] )  2  Malignant melanoma of torso excluding breast ([address] )    He is doing well with no clinical evidence of melanoma recurrence or metastasis  Labs reviewed and okay  He is due for imaging draining LNs in his neck  Also has follow-up with Dr Yoel Churchill    Continues to follow closely with Dermatology with no issues or concerns for new, changing, concerning skin lesions  No new lymphadenopathy  He will continue on surveillance  Due for physical exam and blood work in 6 months  Orders placed in scripts provided  He knows to call with any issues or concerns prior to his next visit  - CBC and differential; Future  - Comprehensive metabolic panel; Future  - LD,Blood; Future      Return in about 6 months (around 3/7/2023) for Office Visit, labs       Jeremy Boggs MD, PhD

## 2022-09-07 NOTE — LETTER
September 12, 2022     Margaux Figueredo, [address] [facility]    Patient: Edda Seay   YOB: 1955   Date of Visit: 9/7/2022       Dear Dr Sergio Becerra:    Thank you for referring Edda Seay to me for evaluation  Below are my notes for this consultation  If you have questions, please do not hesitate to call me  I look forward to following your patient along with you  Sincerely,        Dixie Holter, MD        CC: Bernhard Elam, PA-C Lindaann Meadow , MD Verdie Ang, MD Dixie Holter, MD  9/12/2022 12:10 AM  Sign when Signing Visit  [address] [place] [address] 58  [phone]  [phone]     Date of Visit: 9/7/2022  Name: Edda Seay   YOB: 1955        Subjective    VISIT DIAGNOSIS:  Diagnoses and all orders for this visit:    Malignant melanoma of upper back ([address] )  -     CBC and differential; Future  -     Comprehensive metabolic panel; Future  -     LD,Blood; Future    Malignant melanoma of torso excluding breast (HCC)  -     CBC and differential; Future  -     Comprehensive metabolic panel; Future  -     LD,Blood; Future        Oncology History   Malignant melanoma of upper back ([address] )   12/20/2021 Biopsy    Left upper back Melam[place]: shave biopsy  Thickness: 0 8mm  Ulceration: none   Mitoses: 0  Margins: Approaches both lateral margins and extends to the deep margin      12/20/2021 -  Cancer Staged    Staging form: Melanoma of the Skin, AJCC 8th Edition  - Clinical stage from 12/20/2021: Stage IB (cT1b, cN0, cM0) - Signed by Dixie Holter, MD on 1/27/2022 1/17/2022 Initial Diagnosis    Malignant melanoma of upper back ([address] )     2/15/2022 Surgery    A  Lymph node, sentinel, left neck #1:  Two lymph nodes, negative for metastatic melanoma (0/2)  B  Lymph node, sentinel, left neck #2:  Two lymph nodes, negative for metastatic melanoma (0/2)      C  Skin and soft tissue, upper left back, wide excision: Scar  Cancer Staging  Malignant melanoma of upper back [facility])  Staging form: Melanoma of the Skin, AJCC 8th Edition  - Clinical stage from 12/20/2021: Stage IB (cT1b, cN0, cM0) - Signed by Mary Alejo MD on 1/27/2022       HISTORY OF PRESENT ILLNESS: Renata Mccord is a 79 y o  male  who has a history of stage I B melanoma here for follow-up and surveillance  Doing well with no issues or complaints  Recently saw Dermatology with no new, changing, concerning skin lesions  Denies any new lymphadenopathy at this time  He continues to follow with primary care  He will have GI follow-up as well due to history of diverticulitis with recent flare with wall with antibiotics  Time no new issues or complaints at this time  Due for US and follow up with Dr Leroy Garcia now  REVIEW OF SYSTEMS:  Review of Systems   Constitutional: Negative for appetite change, fatigue, fever and unexpected weight change  HENT:   Negative for lump/mass  Eyes: Negative for icterus  Respiratory: Negative for cough, shortness of breath and wheezing  Cardiovascular: Negative for leg swelling  Gastrointestinal: Negative for abdominal pain, constipation, diarrhea, nausea and vomiting  Genitourinary: Negative for difficulty urinating and hematuria  Musculoskeletal: Negative for arthralgias, gait problem and myalgias  Skin: Negative for itching and rash  No new, changing, or concerning lesions  Neurological: Negative for extremity weakness, gait problem, headaches, light-headedness and numbness  Hematological: Negative for adenopathy          MEDICATIONS:    Current Outpatient Medications:     atorvastatin (LIPITOR) 20 mg tablet, Take 1 tablet (20 mg total) by mouth daily, Disp: 90 tablet, Rfl: 1    losartan (COZAAR) 100 MG tablet, Take 1 tablet (100 mg total) by mouth daily, Disp: 90 tablet, Rfl: 1    omeprazole (PriLOSEC) 40 MG capsule, Take 40 mg by mouth daily Every other day, Disp: , Rfl:      ALLERGIES:  Allergies   Allergen Reactions    Neomycin-Polymyxin-Dexameth      Annotation - [id number]: skin peeled around eyes    Pollen Extract         ACTIVE PROBLEMS:  Patient Active Problem List   Diagnosis    HTN (hypertension)    Hypercholesterolemia    Numerous moles    GERD (gastroesophageal reflux disease)    Acute left flank pain    Diverticulosis of colon    Suprapubic pain, acute    BMI 28 0-28 9,adult    Diverticulitis of large intestine without perforation or abscess without bleeding    Nephrolithiasis    Malignant melanoma of upper back ([facility] [address] )          PAST MEDICAL HISTORY:   Past Medical History:   Diagnosis Date    Allergic rhinitis     Resolved 1/14/2016     Allergy     Mild; spring and fall    Erectile dysfunction of nonorganic origin     Resolved 1/14/2016     GERD (gastroesophageal reflux disease)     Hyperlipidemia     Rosacea     Resolved 1/14/2016         PAST SURGICAL HISTORY:  Past Surgical History:   Procedure Laterality Date    COLONOSCOPY      FACIAL/NECK BIOPSY Right 1/9/2018    Procedure: CHEEK and LOWER EYELID BCC EXCISION AND COMPLEX CLOSURE;  Surgeon: Deuce Quintana MD;  [facility];  Service: Plastics    FL INJECTION LEFT HIP (NON ARTHROGRAM)  2/7/2022    LYMPH NODE BIOPSY Left 2/15/2022    Procedure: BIOPSY LYMPH NODE SENTINEL (NUC MED INJECTION AT 1300);   Surgeon: Bryce Dwyer MD;  [facility];  Service: Surgical Oncology    SKIN LESION EXCISION Left 2/15/2022    Procedure: UPPER BACK LESION WIDE EXCISION;  Surgeon: Bryce Dwyer MD;  [facility];  Service: Surgical Oncology    WISDOM TOOTH EXTRACTION          SOCIAL HISTORY:  Social History     Socioeconomic History    Marital status: /Civil Union     Spouse name: None    Number of children: None    Years of education: None    Highest education level: None   Occupational History    None   Tobacco Use    Smoking status: Light Tobacco Smoker     Types: Cigars    Smokeless tobacco: Never Used    Tobacco comment: cigar 6 times a year   Vaping Use    Vaping Use: Never used   Substance and Sexual Activity    Alcohol use: Yes     Alcohol/week: 1 0 standard drink     Types: 1 Shots of liquor per week     Comment: occasionally    Drug use: No    Sexual activity: None   Other Topics Concern    None   Social History Narrative    Former smoker - As per [employer]    Never used drugs - As per [employer]      Social Determinants of Health     Financial Resource Strain: Not on file   Food Insecurity: Not on file   Transportation Needs: Not on file   Physical Activity: Not on file   Stress: Not on file   Social Connections: Not on file   Intimate Partner Violence: Not on file   Housing Stability: Not on file        FAMILY HISTORY:  Family History   Problem Relation Age of Onset    Skin cancer Mother     No Known Problems Father            Objective    PHYSICAL EXAMINATION:   Blood pressure 102/72, pulse 68, temperature 97 8 °F (36 6 °C), resp  rate 14, height 6' (1 829 m), weight 96 2 kg (212 lb), SpO2 97 %  ECOG Performance Status    Flowsheet Row Most Recent Value   ECOG Performance Status 0 - Fully active, able to carry on all pre-disease performance without restriction             Physical Exam  Constitutional:       General: He is not in acute distress  Appearance: Normal appearance  He is not toxic-appearing  HENT:      Mouth/Throat:      Mouth: Mucous membranes are moist       Pharynx: Oropharynx is clear  Eyes:      General: No scleral icterus  Cardiovascular:      Rate and Rhythm: Normal rate and regular rhythm  Pulses: Normal pulses  Heart sounds: No murmur heard  No friction rub  No gallop  Pulmonary:      Effort: Pulmonary effort is normal  No respiratory distress  Breath sounds: Normal breath sounds  No wheezing or rales     Chest:   Breasts:      Right: No axillary adenopathy or supraclavicular adenopathy  Left: No axillary adenopathy or supraclavicular adenopathy  Abdominal:      General: There is no distension  Palpations: There is no mass  Tenderness: There is no abdominal tenderness  There is no rebound  Musculoskeletal:         General: No swelling or tenderness  Right lower leg: No edema  Left lower leg: No edema  Lymphadenopathy:      Head:      Right side of head: No submandibular, preauricular or posterior auricular adenopathy  Left side of head: No submandibular, preauricular or posterior auricular adenopathy  Cervical: No cervical adenopathy  Right cervical: No superficial or posterior cervical adenopathy  Left cervical: No superficial or posterior cervical adenopathy  Upper Body:      Right upper body: No supraclavicular or axillary adenopathy  Left upper body: No supraclavicular or axillary adenopathy  Skin:     Findings: No rash  Comments: Well healed surgical scar  No evidence of recurrence at primary site  Neurological:      General: No focal deficit present  Mental Status: He is alert and oriented to person, place, and time  Psychiatric:         Mood and Affect: Mood normal          Behavior: Behavior normal          Thought Content: Thought content normal          Judgment: Judgment normal          I reviewed lab data in the chart      WBC   Date Value Ref Range Status   09/06/2022 6 54 4 31 - 10 16 Thousand/uL Final   01/27/2022 5 32 4 31 - 10 16 Thousand/uL Final   11/04/2021 10 61 (H) 4 31 - 10 16 Thousand/uL Final     Hemoglobin   Date Value Ref Range Status   09/06/2022 15 4 12 0 - 17 0 g/dL Final   01/27/2022 15 0 12 0 - 17 0 g/dL Final   11/04/2021 15 1 12 0 - 17 0 g/dL Final     Platelets   Date Value Ref Range Status   09/06/2022 178 149 - 390 Thousands/uL Final   01/27/2022 196 149 - 390 Thousands/uL Final   11/04/2021 230 149 - 390 Thousands/uL Final     MCV   Date Value Ref Range Status   09/06/2022 94 82 - 98 fL Final   01/27/2022 91 82 - 98 fL Final   11/04/2021 94 82 - 98 fL Final      Potassium   Date Value Ref Range Status   09/06/2022 3 7 3 5 - 5 3 mmol/L Final   01/27/2022 4 2 3 5 - 5 3 mmol/L Final   11/04/2021 3 8 3 5 - 5 3 mmol/L Final     Chloride   Date Value Ref Range Status   09/06/2022 104 96 - 108 mmol/L Final   01/27/2022 106 100 - 108 mmol/L Final   11/04/2021 100 100 - 108 mmol/L Final     CO2   Date Value Ref Range Status   09/06/2022 26 21 - 32 mmol/L Final   01/27/2022 27 21 - 32 mmol/L Final   11/04/2021 27 21 - 32 mmol/L Final     BUN   Date Value Ref Range Status   09/06/2022 19 5 - 25 mg/dL Final   01/27/2022 22 5 - 25 mg/dL Final   11/04/2021 16 5 - 25 mg/dL Final     Creatinine   Date Value Ref Range Status   09/06/2022 1 13 0 60 - 1 30 mg/dL Final     Comment:     Standardized to IDMS reference method   01/27/2022 1 28 0 60 - 1 30 mg/dL Final     Comment:     Standardized to IDMS reference method   11/04/2021 1 54 (H) 0 60 - 1 30 mg/dL Final     Comment:     Standardized to IDMS reference method     Glucose   Date Value Ref Range Status   11/04/2021 94 65 - 140 mg/dL Final     Comment:     If the patient is fasting, the ADA then defines impaired fasting glucose as > 100 mg/dL and diabetes as > or equal to 123 mg/dL  Specimen collection should occur prior to Sulfasalazine administration due to the potential for falsely depressed results  Specimen collection should occur prior to Sulfapyridine administration due to the potential for falsely elevated results       Calcium   Date Value Ref Range Status   09/06/2022 9 0 8 3 - 10 1 mg/dL Final   01/27/2022 9 9 8 3 - 10 1 mg/dL Final   11/04/2021 9 1 8 3 - 10 1 mg/dL Final     Albumin   Date Value Ref Range Status   09/06/2022 4 0 3 5 - 5 0 g/dL Final   01/27/2022 3 8 3 5 - 5 0 g/dL Final   11/04/2021 3 9 3 5 - 5 0 g/dL Final     Total Bilirubin   Date Value Ref Range Status   09/06/2022 1 08 (H) 0 20 - 1 00 mg/dL Final Comment:     Use of this assay is not recommended for patients undergoing treatment with eltrombopag due to the potential for falsely elevated results  01/27/2022 0 73 0 20 - 1 00 mg/dL Final     Comment:     Use of this assay is not recommended for patients undergoing treatment with eltrombopag due to the potential for falsely elevated results  11/04/2021 1 28 (H) 0 20 - 1 00 mg/dL Final     Comment:     Use of this assay is not recommended for patients undergoing treatment with eltrombopag due to the potential for falsely elevated results  Alkaline Phosphatase   Date Value Ref Range Status   09/06/2022 56 46 - 116 U/L Final   01/27/2022 65 46 - 116 U/L Final   11/04/2021 71 46 - 116 U/L Final     AST   Date Value Ref Range Status   09/06/2022 18 5 - 45 U/L Final     Comment:     Specimen collection should occur prior to Sulfasalazine administration due to the potential for falsely depressed results  01/27/2022 17 5 - 45 U/L Final     Comment:     Specimen collection should occur prior to Sulfasalazine administration due to the potential for falsely depressed results  11/04/2021 16 5 - 45 U/L Final     Comment:     Specimen collection should occur prior to Sulfasalazine administration due to the potential for falsely depressed results  ALT   Date Value Ref Range Status   09/06/2022 29 12 - 78 U/L Final     Comment:     Specimen collection should occur prior to Sulfasalazine administration due to the potential for falsely depressed results  01/27/2022 31 12 - 78 U/L Final     Comment:     Specimen collection should occur prior to Sulfasalazine and/or Sulfapyridine administration due to the potential for falsely depressed results  11/04/2021 32 12 - 78 U/L Final     Comment:     Specimen collection should occur prior to Sulfasalazine administration due to the potential for falsely depressed results         LD   Date Value Ref Range Status   09/06/2022 157 81 - 234 U/L Final   01/27/2022 186 81 - 234 U/L Final     No results found for: TSH  No results found for: D4JRMTD   No results found for: FREET4      RECENT IMAGING:  Due for US now of LN basin         Assessment/Plan  Mr Yoana Tadeo is a 79 yr male with Stage IB melanoma here for follow up ans surveillance    1  Malignant melanoma of upper back ([facility] [address] )  2  Malignant melanoma of torso excluding breast ([facility] [address] )    He is doing well with no clinical evidence of melanoma recurrence or metastasis  Labs reviewed and okay  He is due for imaging draining LNs in his neck  Also has follow-up with Dr Julian Martinez  Continues to follow closely with Dermatology with no issues or concerns for new, changing, concerning skin lesions  No new lymphadenopathy  He will continue on surveillance  Due for physical exam and blood work in 6 months  Orders placed in scripts provided  He knows to call with any issues or concerns prior to his next visit  - CBC and differential; Future  - Comprehensive metabolic panel; Future  - LD,Blood; Future      Return in about 6 months (around 3/7/2023) for Office Visit, labs       Onesimo Jackson MD, PhD

## 2022-09-22 ENCOUNTER — HOSPITAL ENCOUNTER (OUTPATIENT)
Dept: ULTRASOUND IMAGING | Facility: HOSPITAL | Age: 67
Discharge: HOME | End: 2022-09-22
Attending: STUDENT IN AN ORGANIZED HEALTH CARE EDUCATION/TRAINING PROGRAM
Payer: MEDICARE

## 2022-09-22 DIAGNOSIS — C43.59 MALIGNANT MELANOMA OF UPPER BACK (HCC): ICD-10-CM

## 2022-09-22 PROBLEM — Z85.820 ENCOUNTER FOR FOLLOW-UP SURVEILLANCE OF MELANOMA: Status: ACTIVE | Noted: 2022-09-22

## 2022-09-22 PROBLEM — Z08 ENCOUNTER FOR FOLLOW-UP SURVEILLANCE OF MELANOMA: Status: ACTIVE | Noted: 2022-09-22

## 2022-09-22 PROBLEM — Z85.820 PERSONAL HISTORY OF MALIGNANT MELANOMA: Status: ACTIVE | Noted: 2022-01-17

## 2022-09-22 PROCEDURE — 76536 US EXAM OF HEAD AND NECK: CPT

## 2022-09-27 ENCOUNTER — OFFICE VISIT (OUTPATIENT)
Dept: SURGICAL ONCOLOGY | Facility: CLINIC | Age: 67
End: 2022-09-27
Payer: MEDICARE

## 2022-09-27 VITALS
TEMPERATURE: 97.9 F | SYSTOLIC BLOOD PRESSURE: 135 MMHG | WEIGHT: 213.5 LBS | BODY MASS INDEX: 28.92 KG/M2 | DIASTOLIC BLOOD PRESSURE: 82 MMHG | HEIGHT: 72 IN | HEART RATE: 80 BPM | OXYGEN SATURATION: 100 % | RESPIRATION RATE: 18 BRPM

## 2022-09-27 DIAGNOSIS — Z85.820 PERSONAL HISTORY OF MALIGNANT MELANOMA: ICD-10-CM

## 2022-09-27 DIAGNOSIS — Z85.820 ENCOUNTER FOR FOLLOW-UP SURVEILLANCE OF MELANOMA: Primary | ICD-10-CM

## 2022-09-27 DIAGNOSIS — Z08 ENCOUNTER FOR FOLLOW-UP SURVEILLANCE OF MELANOMA: Primary | ICD-10-CM

## 2022-09-27 PROCEDURE — 99213 OFFICE O/P EST LOW 20 MIN: CPT | Performed by: STUDENT IN AN ORGANIZED HEALTH CARE EDUCATION/TRAINING PROGRAM

## 2022-09-27 NOTE — PROGRESS NOTES
Surgical Oncology Follow Up    [address] [facility] [address]  [place] [id number] [phone]    Tirso King  1955  [id number]    Diagnoses and all orders for this visit:    Encounter for follow-up surveillance of melanoma    Personal history of malignant melanoma        Chief Complaint   Patient presents with    follow up       No follow-ups on file  Oncology History   Personal history of malignant melanoma   12/20/2021 Biopsy    Left upper back Melamona: shave biopsy  Thickness: 0 8mm  Ulceration: none   Mitoses: 0  Margins: Approaches both lateral margins and extends to the deep margin      12/20/2021 -  Cancer Staged    Staging form: Melanoma of the Skin, AJCC 8th Edition  - Clinical stage from 12/20/2021: Stage IB (cT1b, cN0, cM0) - Signed by Neda Fox MD on 1/27/2022 1/17/2022 Initial Diagnosis    Malignant melanoma of upper back ([address] )     2/15/2022 Surgery    A  Lymph node, sentinel, left neck #1:  Two lymph nodes, negative for metastatic melanoma (0/2)  B  Lymph node, sentinel, left neck #2:  Two lymph nodes, negative for metastatic melanoma (0/2)  C  Skin and soft tissue, upper left back, wide excision: Scar  Staging: T1bNx    Treatment history: s/p WLE and insufficient SLN bx 2/15/22  Current treatment: obs  Disease status:     History of Present Illness: This is a 51-year-old gentleman who presents in f/u after wide local excision and sentinel lymph node biopsy for a final path T1b melanoma of the back  The sentinel lymph node mapping in the operating room was insufficient, with no dominant site of elevated Johnny counts or blue dye  Recent US @ 6 mo with no abnormal nodes  No pain or weakness  No new lesions to report  No new lesions biopsied with derm in July       Review of Systems  Complete ROS Surg Onc:   Constitutional: The patient denies new or recent history of general fatigue, no recent weight loss, no change in appetite  Eyes: No complaints of visual problems, no scleral icterus  ENT: no complaints of ear pain, no hoarseness, no difficulty swallowing,  no tinnitus and no new masses in head, oral cavity, or neck  Cardiovascular: No complaints of chest pain, no palpitations, no ankle edema  Respiratory: No complaints of shortness of breath, no cough  Gastrointestinal: No complaints of jaundice, no bloody stools, no pale stools  Genitourinary: No complaints of dysuria, no hematuria, no nocturia, no frequent urination, no urethral discharge  Musculoskeletal: No complaints of weakness, paralysis, joint stiffness or arthralgias  Integumentary: No complaints of rash, no new lesions  Neurological: No complaints of convulsions, no seizures, no dizziness  Hematologic/Lymphatic: No complaints of easy bruising  Endocrine:  No hot or cold intolerance  No polydipsia, polyphagia, or polyuria  Allergy/immunology:  No environmental allergies  No food allergies  Not immunocompromised  Skin:  No pallor or rash  No wound          Patient Active Problem List   Diagnosis    HTN (hypertension)    Hypercholesterolemia    Numerous moles    GERD (gastroesophageal reflux disease)    Acute left flank pain    Diverticulosis of colon    Suprapubic pain, acute    BMI 28 0-28 9,adult    Diverticulitis of large intestine without perforation or abscess without bleeding    Nephrolithiasis    Personal history of malignant melanoma    Encounter for follow-up surveillance of melanoma     Past Medical History:   Diagnosis Date    Allergic rhinitis     Resolved 1/14/2016     Allergy     Mild; spring and fall    Erectile dysfunction of nonorganic origin     Resolved 1/14/2016     GERD (gastroesophageal reflux disease)     Hyperlipidemia     Rosacea     Resolved 1/14/2016      Past Surgical History:   Procedure Laterality Date    COLONOSCOPY      FACIAL/NECK BIOPSY Right 1/9/2018 Procedure: CHEEK and LOWER EYELID BCC EXCISION AND COMPLEX CLOSURE;  Surgeon: Latasha Krishna MD;  [facility];  Service: Plastics    FL INJECTION LEFT HIP (NON ARTHROGRAM)  2/7/2022    LYMPH NODE BIOPSY Left 2/15/2022    Procedure: BIOPSY LYMPH NODE SENTINEL (NUC MED INJECTION AT 1300); Surgeon: Jose Maria Duran MD;  [facility];  Service: Surgical Oncology    SKIN LESION EXCISION Left 2/15/2022    Procedure: UPPER BACK LESION WIDE EXCISION;  Surgeon: Jose Maria Duran MD;  [facility];  Service: Surgical Oncology    WISDOM TOOTH EXTRACTION       Family History   Problem Relation Age of Onset    Skin cancer Mother     No Known Problems Father      Social History     Socioeconomic History    Marital status: /Civil Union     Spouse name: Not on file    Number of children: Not on file    Years of education: Not on file    Highest education level: Not on file   Occupational History    Not on file   Tobacco Use    Smoking status: Light Tobacco Smoker     Types: Cigars    Smokeless tobacco: Never Used    Tobacco comment: cigar 6 times a year   Vaping Use    Vaping Use: Never used   Substance and Sexual Activity    Alcohol use:  Yes     Alcohol/week: 1 0 standard drink     Types: 1 Shots of liquor per week     Comment: occasionally    Drug use: No    Sexual activity: Not on file   Other Topics Concern    Not on file   Social History Narrative    Former smoker - As per [employer]    Never used drugs - As per [employer]      Social Determinants of Health     Financial Resource Strain: Not on file   Food Insecurity: Not on file   Transportation Needs: Not on file   Physical Activity: Not on file   Stress: Not on file   Social Connections: Not on file   Intimate Partner Violence: Not on file   Housing Stability: Not on file       Current Outpatient Medications:     atorvastatin (LIPITOR) 20 mg tablet, Take 1 tablet (20 mg total) by mouth daily, Disp: 90 tablet, Rfl: 1    losartan (COZAAR) 100 MG tablet, Take 1 tablet (100 mg total) by mouth daily, Disp: 90 tablet, Rfl: 1    omeprazole (PriLOSEC) 40 MG capsule, Take 40 mg by mouth daily Every other day, Disp: , Rfl:   Allergies   Allergen Reactions    Neomycin-Polymyxin-Dexameth      Annotation - [id number]: skin peeled around eyes    Pollen Extract      Vitals:    09/27/22 1001   BP: 135/82   Pulse: 80   Resp: 18   Temp: 97 9 °F (36 6 °C)   SpO2: 100%       Physical Exam  Constitutional: Patient is well-developed and well-nourished who appears the stated age in no acute distress  Patient is pleasant and talkative  HEENT:  Normocephalic  Sclerae are anicteric  Mucous membranes are moist  Neck is supple without adenopathy  Incision well-healed, No JVD  Chest: Equal chest rise, easy WOB  Cardiac: Heart is regular rate  Abdomen: Abdomen is non-tender, non-distended and without masses  Extremities: There is no clubbing or cyanosis  There is no edema  Symmetric  Neuro: Grossly nonfocal  Gait is normal      Lymphatic: No evidence of cervical adenopathy bilaterally  No evidence of axillary adenopathy bilaterally  No evidence of inguinal adenopathy bilaterally  Skin: Warm, anicteric  Incision flat with no abnormality  Psych:  Patient is pleasant and talkative  Pathology:  As above    Labs:  Reviewed in Tweet Category personally    Imaging  NM lymphatic melanoma    Addendum Date: 2/15/2022 Addendum:   ADDENDUM: Please note the dictation error documenting the site at left upper back  0 48 mCi Tc-99m sulfur colloid (0 6 cc volume) was administered intradermally in divided doses in the region of the patients left upper back melanoma  Result Date: 2/15/2022  Narrative: SENTINEL NODE LYMPHOSCINTIGRAPHY INDICATION:   Left upper back melanoma  FINDINGS: 0 48 mCi Tc-99m sulfur colloid (0 6 cc volume) was administered intradermally in divided doses in the region of the patients left upper pelvic melanoma    Scintigraphic images were obtained over the chest and neck  Initially 2 foci of intense tracer uptake identified in the left neck base followed by a  slightly inferior tracer uptake  Using scintigraphic guidance, the corresponding skin sites were marked with an indelible marker  The patient was transferred to the operating room in satisfactory condition  Impression: 1  [place] lymph node localized to left neck base to at least 3 foci of tracer uptake    Workstation performed: [id number]     FL injection left hip (non arthrogram)    Result Date: 2/7/2022  Narrative: LEFT HIP INJECTION INDICATION:  Patient presents with prescription for left hip steroid injection  COMPARISON:  Plain films hips and pelvis left 1/8/2022  IMAGES:  3 FLUOROSCOPY TIME:  0 1 MFT  FINDINGS: After the risks and benefits of the procedure were thoroughly explained, informed consent was obtained  The patient was prepped and draped in the usual sterile fashion  1% lidocaine solution was utilized for local anesthesia  Intermittent fluoroscopy was utilized for placement a 20 gauge  3 5 inch spinal needle within the left hip joint  After positioning was confirmed with 3 mL of Omnipaque 300, a solution comprised of 1 mL 80 mg/mL Depomedrol, 2 mL of 0 25% Sensorcaine and 2 mL 1% Xylocaine  was injected into the left hip joint  The patient tolerated the procedure well  There were no complications  Impression: Post injection patient does admit to mid improvement of his typical left hip pain  Technically successful left hip steroid injection  Procedure was performed by ELZA Currie PA-C under the direct supervision of Dr Mode Pro  Workstation performed: [id number]       I reviewed the above laboratory and imaging data  Discussion/Summary:   T1 B melanoma of the back excised 2/2022, with an insufficient sentinel lymph node procedure  Recent neck US with no abnormalities  Doing well   Will RTC in 6 mo with repeat US and for H&P

## 2022-11-15 ENCOUNTER — TELEPHONE (OUTPATIENT)
Dept: INTERNAL MEDICINE CLINIC | Facility: OTHER | Age: 67
End: 2022-11-15

## 2022-11-15 NOTE — TELEPHONE ENCOUNTER
JOSELINEOM for pt to call me at [facility] office    Please schedule follow up and AWV.

## 2022-11-17 ENCOUNTER — TELEPHONE (OUTPATIENT)
Dept: GASTROENTEROLOGY | Facility: CLINIC | Age: 67
End: 2022-11-17

## 2022-11-17 ENCOUNTER — PREP FOR PROCEDURE (OUTPATIENT)
Dept: GASTROENTEROLOGY | Facility: CLINIC | Age: 67
End: 2022-11-17

## 2022-11-17 DIAGNOSIS — Z86.010 HISTORY OF COLON POLYPS: Primary | ICD-10-CM

## 2022-11-17 DIAGNOSIS — Z87.19 HISTORY OF DIVERTICULITIS OF COLON: ICD-10-CM

## 2022-11-17 NOTE — TELEPHONE ENCOUNTER
Scheduled date of colonoscopy (as of today):01/13/23  Physician performing colonoscopy:Dr Dandy Clayton  Location of colonoscopy:[facility] [place]  Instructions reviewed with patient by:quan  Clearances: n/a

## 2022-11-17 NOTE — TELEPHONE ENCOUNTER
11/17/22  Screened by: Thomas Michaels    Referring Provider Dr Radha Kang    Pre- Screening: There is no height or weight on file to calculate BMI  Has patient been referred for a routine screening Colonoscopy? no  Is the patient between 39-70 years old? yes      Previous Colonoscopy yes   If yes:    Date: 5 years ago    Facility: Dr Flor Mendez    Reason:       SCHEDULING STAFF: If the patient is between 39yrs-47yrs, please advise patient to confirm benefits/coverage with their insurance company for a routine screening colonoscopy, some insurance carriers will only cover at [address] or older  If the patient is over 66years old, please schedule an office visit  Does the patient want to see a Gastroenterologist prior to their procedure OR are they having any GI symptoms? no    Has the patient been hospitalized or had abdominal surgery in the past 6 months? no    Does the patient use supplemental oxygen? no    Does the patient take Coumadin, Lovenox, Plavix, Elliquis, Xarelto, or other blood thinning medication? no    Has the patient had a stroke, cardiac event, or stent placed in the past year? no    SCHEDULING STAFF: If patient answers NO to above questions, then schedule procedure  If patient answers YES to above questions, then schedule office appointment  If patient is between 45yrs - 49yrs, please advise patient that we will have to confirm benefits & coverage with their insurance company for a routine screening colonoscopy

## 2022-12-13 ENCOUNTER — RA CDI HCC (OUTPATIENT)
Dept: OTHER | Facility: HOSPITAL | Age: 67
End: 2022-12-13

## 2022-12-20 ENCOUNTER — OFFICE VISIT (OUTPATIENT)
Dept: INTERNAL MEDICINE CLINIC | Facility: OTHER | Age: 67
End: 2022-12-20

## 2022-12-20 VITALS
WEIGHT: 215 LBS | OXYGEN SATURATION: 97 % | HEART RATE: 86 BPM | TEMPERATURE: 97.9 F | HEIGHT: 72 IN | RESPIRATION RATE: 18 BRPM | BODY MASS INDEX: 29.12 KG/M2 | SYSTOLIC BLOOD PRESSURE: 118 MMHG | DIASTOLIC BLOOD PRESSURE: 82 MMHG

## 2022-12-20 DIAGNOSIS — E78.00 HYPERCHOLESTEROLEMIA: ICD-10-CM

## 2022-12-20 DIAGNOSIS — Z08 ENCOUNTER FOR FOLLOW-UP SURVEILLANCE OF MELANOMA: ICD-10-CM

## 2022-12-20 DIAGNOSIS — D22.9 NUMEROUS MOLES: ICD-10-CM

## 2022-12-20 DIAGNOSIS — K21.9 GASTROESOPHAGEAL REFLUX DISEASE WITHOUT ESOPHAGITIS: ICD-10-CM

## 2022-12-20 DIAGNOSIS — Z12.5 SCREENING FOR PROSTATE CANCER: ICD-10-CM

## 2022-12-20 DIAGNOSIS — Z11.59 ENCOUNTER FOR HEPATITIS C SCREENING TEST FOR LOW RISK PATIENT: ICD-10-CM

## 2022-12-20 DIAGNOSIS — Z85.820 ENCOUNTER FOR FOLLOW-UP SURVEILLANCE OF MELANOMA: ICD-10-CM

## 2022-12-20 DIAGNOSIS — Z00.00 MEDICARE ANNUAL WELLNESS VISIT, SUBSEQUENT: Primary | ICD-10-CM

## 2022-12-20 DIAGNOSIS — Z85.820 PERSONAL HISTORY OF MALIGNANT MELANOMA: ICD-10-CM

## 2022-12-20 DIAGNOSIS — I10 PRIMARY HYPERTENSION: ICD-10-CM

## 2022-12-20 PROBLEM — K57.30 DIVERTICULOSIS OF COLON: Status: RESOLVED | Noted: 2021-11-04 | Resolved: 2022-12-20

## 2022-12-20 PROBLEM — R10.9 ACUTE LEFT FLANK PAIN: Status: RESOLVED | Noted: 2021-11-04 | Resolved: 2022-12-20

## 2022-12-20 PROBLEM — K57.32 DIVERTICULITIS OF LARGE INTESTINE WITHOUT PERFORATION OR ABSCESS WITHOUT BLEEDING: Status: RESOLVED | Noted: 2021-11-04 | Resolved: 2022-12-20

## 2022-12-20 PROBLEM — R10.2 SUPRAPUBIC PAIN, ACUTE: Status: RESOLVED | Noted: 2021-11-04 | Resolved: 2022-12-20

## 2022-12-20 PROBLEM — N20.0 NEPHROLITHIASIS: Status: RESOLVED | Noted: 2021-11-04 | Resolved: 2022-12-20

## 2022-12-20 NOTE — PATIENT INSTRUCTIONS
Medicare Preventive Visit Patient Instructions  Thank you for completing your Welcome to Medicare Visit or Medicare Annual Wellness Visit today  Your next wellness visit will be due in one year (12/21/2023)  The screening/preventive services that you may require over the next 5-10 years are detailed below  Some tests may not apply to you based off risk factors and/or age  Screening tests ordered at today's visit but not completed yet may show as past due  Also, please note that scanned in results may not display below  Preventive Screenings:  Service Recommendations Previous Testing/Comments   Colorectal Cancer Screening  · Colonoscopy    · Fecal Occult Blood Test (FOBT)/Fecal Immunochemical Test (FIT)  · Fecal DNA/Cologuard Test  · Flexible Sigmoidoscopy Age: 39-70 years old   Colonoscopy: every 10 years (May be performed more frequently if at higher risk)  OR  FOBT/FIT: every 1 year  OR  Cologuard: every 3 years  OR  Sigmoidoscopy: every 5 years  Screening may be recommended earlier than age 39 if at higher risk for colorectal cancer  Also, an individualized decision between you and your healthcare provider will decide whether screening between the ages of 74-80 would be appropriate   Colonoscopy: 01/10/2019  FOBT/FIT: Not on file  Cologuard: Not on file  Sigmoidoscopy: Not on file    Screening Current     Prostate Cancer Screening Individualized decision between patient and health care provider in men between ages of 53-78   Medicare will cover every 12 months beginning on the day after your 50th birthday PSA: 0 8 ng/mL     Screening Current     Hepatitis C Screening Once for adults born between [facility]  More frequently in patients at high risk for Hepatitis C Hep C Antibody: Not on file        Diabetes Screening 1-2 times per year if you're at risk for diabetes or have pre-diabetes Fasting glucose: 102 mg/dL (9/6/2022)  A1C: No results in last 5 years (No results in last 5 years)  Screening Current Cholesterol Screening Once every 5 years if you don't have a lipid disorder  May order more often based on risk factors  Lipid panel: 01/27/2022  Screening Not Indicated  History Lipid Disorder      Other Preventive Screenings Covered by Medicare:  1  Abdominal Aortic Aneurysm (AAA) Screening: covered once if your at risk  You're considered to be at risk if you have a family history of AAA or a male between the age of 73-68 who smoking at least 100 cigarettes in your lifetime  2  Lung Cancer Screening: covers low dose CT scan once per year if you meet all of the following conditions: (1) Age 50-69; (2) No signs or symptoms of lung cancer; (3) Current smoker or have quit smoking within the last 15 years; (4) You have a tobacco smoking history of at least 20 pack years (packs per day x number of years you smoked); (5) You get a written order from a healthcare provider  3  Glaucoma Screening: covered annually if you're considered high risk: (1) You have diabetes OR (2) Family history of glaucoma OR (3)  aged 48 and older OR (3)  American aged 72 and older  3  Osteoporosis Screening: covered every 2 years if you meet one of the following conditions: (1) Have a vertebral abnormality; (2) On glucocorticoid therapy for more than 3 months; (3) Have primary hyperparathyroidism; (4) On osteoporosis medications and need to assess response to drug therapy  5  HIV Screening: covered annually if you're between the age of 12-76  Also covered annually if you are younger than 13 and older than 72 with risk factors for HIV infection  For pregnant patients, it is covered up to 3 times per pregnancy      Immunizations:  Immunization Recommendations   Influenza Vaccine Annual influenza vaccination during flu season is recommended for all persons aged >= 6 months who do not have contraindications   Pneumococcal Vaccine   * Pneumococcal conjugate vaccine = PCV13 (Prevnar 13), PCV15 (Vaxneuvance), PCV20 (Prevnar 20)  * Pneumococcal polysaccharide vaccine = PPSV23 (Pneumovax) Adults 2364 years old: 1-3 doses may be recommended based on certain risk factors  Adults 72 years old: 1-2 doses may be recommended based off what pneumonia vaccine you previously received   Hepatitis B Vaccine 3 dose series if at intermediate or high risk (ex: diabetes, end stage renal disease, liver disease)   Tetanus (Td) Vaccine - COST NOT COVERED BY MEDICARE PART B Following completion of primary series, a booster dose should be given every 10 years to maintain immunity against tetanus  Td may also be given as tetanus wound prophylaxis  Tdap Vaccine - COST NOT COVERED BY MEDICARE PART B Recommended at least once for all adults  For pregnant patients, recommended with each pregnancy  Shingles Vaccine (Shingrix) - COST NOT COVERED BY MEDICARE PART B  2 shot series recommended in those aged 48 and above     Health Maintenance Due:      Topic Date Due   • Hepatitis C Screening  Never done   • Colorectal Cancer Screening  01/10/2024     Immunizations Due:      Topic Date Due   • Hepatitis B Vaccine (1 of 3 - 3-dose series) Never done   • Pneumococcal Vaccine: 65+ Years (1 - PCV) Never done   • COVID-19 Vaccine (4 - Booster for Pfizer series) 03/05/2022   • Influenza Vaccine (1) 09/01/2022     Advance Directives   What are advance directives? Advance directives are legal documents that state your wishes and plans for medical care  These plans are made ahead of time in case you lose your ability to make decisions for yourself  Advance directives can apply to any medical decision, such as the treatments you want, and if you want to donate organs  What are the types of advance directives? There are many types of advance directives, and each state has rules about how to use them  You may choose a combination of any of the following:  · Living will: This is a written record of the treatment you want   You can also choose which treatments you do not want, which to limit, and which to stop at a certain time  This includes surgery, medicine, IV fluid, and tube feedings  · Durable power of  for healthcare [facility]): This is a written record that states who you want to make healthcare choices for you when you are unable to make them for yourself  This person, called a proxy, is usually a family member or a friend  You may choose more than 1 proxy  · Do not resuscitate (DNR) order:  A DNR order is used in case your heart stops beating or you stop breathing  It is a request not to have certain forms of treatment, such as CPR  A DNR order may be included in other types of advance directives  · Medical directive: This covers the care that you want if you are in a coma, near death, or unable to make decisions for yourself  You can list the treatments you want for each condition  Treatment may include pain medicine, surgery, blood transfusions, dialysis, IV or tube feedings, and a ventilator (breathing machine)  · Values history: This document has questions about your views, beliefs, and how you feel and think about life  This information can help others choose the care that you would choose  Why are advance directives important? An advance directive helps you control your care  Although spoken wishes may be used, it is better to have your wishes written down  Spoken wishes can be misunderstood, or not followed  Treatments may be given even if you do not want them  An advance directive may make it easier for your family to make difficult choices about your care  Cigarette Smoking and Your Health   Risks to your health if you smoke:  Nicotine and other chemicals found in tobacco damage every cell in your body  Even if you are a light smoker, you have an increased risk for cancer, heart disease, and lung disease  If you are pregnant or have diabetes, smoking increases your risk for complications     Benefits to your health if you stop smoking:   · You decrease respiratory symptoms such as coughing, wheezing, and shortness of breath  · You reduce your risk for cancers of the lung, mouth, throat, kidney, bladder, pancreas, stomach, and cervix  If you already have cancer, you increase the benefits of chemotherapy  You also reduce your risk for cancer returning or a second cancer from developing  · You reduce your risk for heart disease, blood clots, heart attack, and stroke  · You reduce your risk for lung infections, and diseases such as pneumonia, asthma, chronic bronchitis, and emphysema  · Your circulation improves  More oxygen can be delivered to your body  If you have diabetes, you lower your risk for complications, such as kidney, artery, and eye diseases  You also lower your risk for nerve damage  Nerve damage can lead to amputations, poor vision, and blindness  · You improve your body's ability to heal and to fight infections  For more information and support to stop smoking:   · [employer]  Phone: 2- 355 - 180-6826  Web Address: [employer]  Weight Management   Why it is important to manage your weight:  Being overweight increases your risk of health conditions such as heart disease, high blood pressure, type 2 diabetes, and certain types of cancer  It can also increase your risk for osteoarthritis, sleep apnea, and other respiratory problems  Aim for a slow, steady weight loss  Even a small amount of weight loss can lower your risk of health problems  How to lose weight safely:  A safe and healthy way to lose weight is to eat fewer calories and get regular exercise  You can lose up about 1 pound a week by decreasing the number of calories you eat by 500 calories each day  Healthy meal plan for weight management:  A healthy meal plan includes a variety of foods, contains fewer calories, and helps you stay healthy  A healthy meal plan includes the following:  · Eat whole-grain foods more often  A healthy meal plan should contain fiber  Fiber is the part of grains, fruits, and vegetables that is not broken down by your body  Whole-grain foods are healthy and provide extra fiber in your diet  Some examples of whole-grain foods are whole-wheat breads and pastas, oatmeal, brown rice, and bulgur  · Eat a variety of vegetables every day  Include dark, leafy greens such as spinach, kale, kayla greens, and mustard greens  Eat yellow and orange vegetables such as carrots, sweet potatoes, and winter squash  · Eat a variety of fruits every day  Choose fresh or canned fruit (canned in its own juice or light syrup) instead of juice  Fruit juice has very little or no fiber  · Eat low-fat dairy foods  Drink fat-free (skim) milk or 1% milk  Eat fat-free yogurt and low-fat cottage cheese  Try low-fat cheeses such as mozzarella and other reduced-fat cheeses  · Choose meat and other protein foods that are low in fat  Choose beans or other legumes such as split peas or lentils  Choose fish, skinless poultry (chicken or turkey), or lean cuts of red meat (beef or pork)  Before you cook meat or poultry, cut off any visible fat  · Use less fat and oil  Try baking foods instead of frying them  Add less fat, such as margarine, sour cream, regular salad dressing and mayonnaise to foods  Eat fewer high-fat foods  Some examples of high-fat foods include french fries, doughnuts, ice cream, and cakes  · Eat fewer sweets  Limit foods and drinks that are high in sugar  This includes candy, cookies, regular soda, and sweetened drinks  Exercise:  Exercise at least 30 minutes per day on most days of the week  Some examples of exercise include walking, biking, dancing, and swimming  You can also fit in more physical activity by taking the stairs instead of the elevator or parking farther away from stores  Ask your healthcare provider about the best exercise plan for you        © Copyright [employer] 2018 Information is for End User's use only and may not be sold, redistributed or otherwise used for commercial purposes   All illustrations and images included in CareNotes® are the copyrighted property of A D A M , Inc  or [facility] [employer] Mon

## 2022-12-20 NOTE — PROGRESS NOTES
Assessment and Plan:     Problem List Items Addressed This Visit    None      Depression Screening and Follow-up Plan: Patient was screened for depression during today's encounter  They screened negative with a PHQ-2 score of 0  Preventive health issues were discussed with patient, and age appropriate screening tests were ordered as noted in patient's After Visit Summary  Personalized health advice and appropriate referrals for health education or preventive services given if needed, as noted in patient's After Visit Summary  History of Present Illness:     Patient presents for a Medicare Wellness Visit    This is a very pleasant 79 years young gentleman who is here today for the Medicare wellness visit subsequent and also for regular visit for his hypertension and hypercholesterolemia he is doing very well he is followed up by the dermatology for previous melanoma and also for the basal cell carcinomas he is regular with his follow-up with the dermatology    Patient is here today for the follow-up  Hypertension  I reviewed antihypertensive medication, patient does not have any side effects of any medications, no signs or symptoms of hypertension hypotension or orthostatic hypotension  Patient is compliant with medications  Blood workup related to hypertensive diagnosis reviewed  For cholesterolemia we will follow-up with the blood work      Previous episodes of diverticulitis there are no problems recently he is doing well bowel movements are good he is a scheduled to go for the colonoscopy next month    Tile dysfunction problem is a stable he is using the medication with no side effects or problems     Patient Care Team:  Ana Maria Thomas MD as PCP - General  RODNEY Frost MD (Surgical Oncology)  Trista Bryant MD (Dermatology)  Christopher Mcneal MD (Oncology)     Review of Systems:     Review of Systems   Constitutional: Negative for appetite change, fatigue and fever  HENT: Negative for congestion, ear pain, hearing loss, nosebleeds, sneezing, tinnitus and voice change  Eyes: Negative for pain, discharge and redness  Respiratory: Negative for cough, chest tightness and wheezing  Cardiovascular: Negative for chest pain, palpitations and leg swelling  Gastrointestinal: Negative for abdominal pain, blood in stool, constipation, diarrhea, nausea and vomiting  Genitourinary: Negative for difficulty urinating, dysuria, hematuria and urgency  Musculoskeletal: Negative for arthralgias (hip left side he was seen by the orthopedic doctor and was given an injection of the pain came back again so severe), back pain, gait problem and joint swelling  Skin: Negative for rash and wound  Allergic/Immunologic: Negative for environmental allergies  Neurological: Negative for dizziness, tremors, seizures, weakness, light-headedness and numbness  Hematological: Negative for adenopathy  Does not bruise/bleed easily  Psychiatric/Behavioral: Negative for behavioral problems and confusion  The patient is not nervous/anxious           Problem List:     Patient Active Problem List   Diagnosis   • HTN (hypertension)   • Hypercholesterolemia   • Numerous moles   • GERD (gastroesophageal reflux disease)   • Acute left flank pain   • Diverticulosis of colon   • Suprapubic pain, acute   • BMI 28 0-28 9,adult   • Diverticulitis of large intestine without perforation or abscess without bleeding   • Nephrolithiasis   • Personal history of malignant melanoma   • Encounter for follow-up surveillance of melanoma      Past Medical and Surgical History:     Past Medical History:   Diagnosis Date   • Allergic rhinitis     Resolved 1/14/2016    • Allergy     Mild; spring and fall   • Erectile dysfunction of nonorganic origin     Resolved 1/14/2016    • GERD (gastroesophageal reflux disease)    • Hyperlipidemia    • Rosacea     Resolved 1/14/2016      Past Surgical History:   Procedure Laterality Date   • COLONOSCOPY     • FACIAL/NECK BIOPSY Right 1/9/2018    Procedure: CHEEK and LOWER EYELID BCC EXCISION AND COMPLEX CLOSURE;  Surgeon: Kiley Shah MD;  [facility];  Service: Plastics   • FL INJECTION LEFT HIP (NON ARTHROGRAM)  2/7/2022   • LYMPH NODE BIOPSY Left 2/15/2022    Procedure: BIOPSY LYMPH NODE SENTINEL (NUC MED INJECTION AT 1300); Surgeon: Dl Segovia MD;  [facility];  Service: Surgical Oncology   • SKIN LESION EXCISION Left 2/15/2022    Procedure: UPPER BACK LESION WIDE EXCISION;  Surgeon: Dl Segovia MD;  [facility];  Service: Surgical Oncology   • WISDOM TOOTH EXTRACTION        Family History:     Family History   Problem Relation Age of Onset   • Skin cancer Mother    • No Known Problems Father       Social History:     Social History     Socioeconomic History   • Marital status: /Civil Union     Spouse name: None   • Number of children: None   • Years of education: None   • Highest education level: None   Occupational History   • None   Tobacco Use   • Smoking status: Light Smoker     Types: Cigars   • Smokeless tobacco: Never   • Tobacco comments:     cigar 6 times a year   Vaping Use   • Vaping Use: Never used   Substance and Sexual Activity   • Alcohol use: Yes     Alcohol/week: 1 0 standard drink     Types: 1 Shots of liquor per week     Comment: occasionally   • Drug use: No   • Sexual activity: None   Other Topics Concern   • None   Social History Narrative    Former smoker - As per [employer]    Never used drugs - As per [employer]      Social Determinants of Health     Financial Resource Strain: Low Risk    • Difficulty of Paying Living Expenses: Not very hard   Food Insecurity: Not on file   Transportation Needs: No Transportation Needs   • Lack of Transportation (Medical): No   • Lack of Transportation (Non-Medical):  No   Physical Activity: Not on file   Stress: Not on file   Social Connections: Not on file Intimate Partner Violence: Not on file   Housing Stability: Not on file      Medications and Allergies:     Current Outpatient Medications   Medication Sig Dispense Refill   • atorvastatin (LIPITOR) 20 mg tablet Take 1 tablet (20 mg total) by mouth daily 90 tablet 1   • losartan (COZAAR) 100 MG tablet Take 1 tablet (100 mg total) by mouth daily 90 tablet 1   • omeprazole (PriLOSEC) 40 MG capsule Take 40 mg by mouth daily Every other day       No current facility-administered medications for this visit  Allergies   Allergen Reactions   • Neomycin-Polymyxin-Dexameth      Annotation - [id number]: skin peeled around eyes   • Pollen Extract       Immunizations:     Immunization History   Administered Date(s) Administered   • COVID-19 PFIZER VACCINE 0 3 ML IM 03/05/2021, 03/26/2021, 12/11/2021   • Influenza, high dose seasonal 0 7 mL 11/11/2021   • Tdap 01/25/2019      Health Maintenance:         Topic Date Due   • Hepatitis C Screening  Never done   • Colorectal Cancer Screening  01/10/2024         Topic Date Due   • Hepatitis B Vaccine (1 of 3 - 3-dose series) Never done   • Pneumococcal Vaccine: 65+ Years (1 - PCV) Never done   • COVID-19 Vaccine (4 - Booster for Pfizer series) 03/05/2022   • Influenza Vaccine (1) 09/01/2022      Medicare Screening Tests and Risk Assessments:     Anil Hood is here for his Subsequent Wellness visit  Last Medicare Wellness visit information reviewed, patient interviewed and updates made to the record today  Health Risk Assessment:   Patient rates overall health as very good  Patient feels that their physical health rating is same  Patient is satisfied with their life  Eyesight was rated as same  Hearing was rated as same  Patient feels that their emotional and mental health rating is same  Patients states they are sometimes angry  Patient states they are sometimes unusually tired/fatigued  Pain experienced in the last 7 days has been none   Patient states that he has experienced no weight loss or gain in last 6 months  Depression Screening:   PHQ-2 Score: 0      Fall Risk Screening: In the past year, patient has experienced: no history of falling in past year      Home Safety:  Patient does not have trouble with stairs inside or outside of their home  Patient has working smoke alarms and has working carbon monoxide detector  Home safety hazards include: none  Nutrition:   Current diet is Regular  Medications:   Patient is not currently taking any over-the-counter supplements  Patient is able to manage medications  Activities of Daily Living (ADLs)/Instrumental Activities of Daily Living (IADLs):   Walk and transfer into and out of bed and chair?: Yes  Dress and groom yourself?: Yes    Bathe or shower yourself?: Yes    Feed yourself?  Yes  Do your laundry/housekeeping?: Yes  Manage your money, pay your bills and track your expenses?: Yes  Make your own meals?: Yes    Do your own shopping?: Yes    Previous Hospitalizations:   Any hospitalizations or ED visits within the last 12 months?: Yes    How many hospitalizations have you had in the last year?: 1-2    Advance Care Planning:   Living will: No    Durable POA for healthcare: No    Advanced directive: Yes    Advanced directive counseling given: Yes      Cognitive Screening:   Provider or family/friend/caregiver concerned regarding cognition?: No    PREVENTIVE SCREENINGS      Cardiovascular Screening:    General: Screening Not Indicated and History Lipid Disorder      Diabetes Screening:     General: Screening Current      Colorectal Cancer Screening:     General: Screening Current      Prostate Cancer Screening:    General: Screening Current    Due for: PSA      Osteoporosis Screening:    General: Screening Not Indicated      Abdominal Aortic Aneurysm (AAA) Screening:    Risk factors include: age between 73-69 yo and tobacco use        Lung Cancer Screening:     General: Screening Not Indicated    Screening, Brief Intervention, and Referral to Treatment (SBIRT)    Screening  Typical number of drinks in a day: 2  Typical number of drinks in a week: 14  Interpretation: Low risk drinking behavior  Single Item Drug Screening:  How often have you used an illegal drug (including marijuana) or a prescription medication for non-medical reasons in the past year? never    Single Item Drug Screen Score: 0  Interpretation: Negative screen for possible drug use disorder    Brief Intervention  Alcohol cessation counseling given  Other Counseling Topics:   Regular weightbearing exercise and calcium and vitamin D intake  No results found  Physical Exam:     There were no vitals taken for this visit  Physical Exam  Vitals and nursing note reviewed  Constitutional:       Appearance: Normal appearance  He is normal weight  HENT:      Head: Normocephalic and atraumatic  Right Ear: Tympanic membrane normal       Left Ear: Tympanic membrane normal       Nose: Nose normal       Mouth/Throat:      Mouth: Mucous membranes are moist    Eyes:      Extraocular Movements: Extraocular movements intact  Pupils: Pupils are equal, round, and reactive to light  Cardiovascular:      Rate and Rhythm: Normal rate and regular rhythm  Pulses: Normal pulses  Heart sounds: Normal heart sounds  Pulmonary:      Effort: Pulmonary effort is normal       Breath sounds: Normal breath sounds  Abdominal:      General: Abdomen is flat  Bowel sounds are normal       Palpations: Abdomen is soft  Musculoskeletal:         General: No swelling, tenderness or deformity  Normal range of motion  Cervical back: Normal range of motion and neck supple  Skin:     General: Skin is warm  Capillary Refill: Capillary refill takes 2 to 3 seconds  Neurological:      General: No focal deficit present  Mental Status: He is alert and oriented to person, place, and time  Mental status is at baseline     Psychiatric:         Mood and Affect: Mood normal          Behavior: Behavior normal           Jared Felder MD

## 2023-01-12 RX ORDER — SODIUM CHLORIDE 9 MG/ML
125 INJECTION, SOLUTION INTRAVENOUS CONTINUOUS
Status: CANCELLED | OUTPATIENT
Start: 2023-01-12

## 2023-01-13 ENCOUNTER — ANESTHESIA (OUTPATIENT)
Dept: GASTROENTEROLOGY | Facility: MEDICAL CENTER | Age: 68
End: 2023-01-13

## 2023-01-13 ENCOUNTER — HOSPITAL ENCOUNTER (OUTPATIENT)
Dept: GASTROENTEROLOGY | Facility: MEDICAL CENTER | Age: 68
Setting detail: OUTPATIENT SURGERY
End: 2023-01-13
Attending: INTERNAL MEDICINE

## 2023-01-13 ENCOUNTER — ANESTHESIA EVENT (OUTPATIENT)
Dept: GASTROENTEROLOGY | Facility: MEDICAL CENTER | Age: 68
End: 2023-01-13

## 2023-01-13 VITALS
DIASTOLIC BLOOD PRESSURE: 69 MMHG | OXYGEN SATURATION: 94 % | RESPIRATION RATE: 18 BRPM | HEART RATE: 85 BPM | WEIGHT: 215 LBS | HEIGHT: 72 IN | BODY MASS INDEX: 29.12 KG/M2 | SYSTOLIC BLOOD PRESSURE: 108 MMHG | TEMPERATURE: 97.2 F

## 2023-01-13 DIAGNOSIS — Z87.19 HISTORY OF DIVERTICULITIS OF COLON: ICD-10-CM

## 2023-01-13 DIAGNOSIS — Z86.010 HISTORY OF COLON POLYPS: ICD-10-CM

## 2023-01-13 RX ORDER — SODIUM CHLORIDE 9 MG/ML
125 INJECTION, SOLUTION INTRAVENOUS CONTINUOUS
Status: DISCONTINUED | OUTPATIENT
Start: 2023-01-13 | End: 2023-01-17 | Stop reason: HOSPADM

## 2023-01-13 RX ORDER — LIDOCAINE HYDROCHLORIDE 20 MG/ML
INJECTION, SOLUTION EPIDURAL; INFILTRATION; INTRACAUDAL; PERINEURAL AS NEEDED
Status: DISCONTINUED | OUTPATIENT
Start: 2023-01-13 | End: 2023-01-13

## 2023-01-13 RX ORDER — PROPOFOL 10 MG/ML
INJECTION, EMULSION INTRAVENOUS AS NEEDED
Status: DISCONTINUED | OUTPATIENT
Start: 2023-01-13 | End: 2023-01-13

## 2023-01-13 RX ORDER — PROPOFOL 10 MG/ML
INJECTION, EMULSION INTRAVENOUS CONTINUOUS PRN
Status: DISCONTINUED | OUTPATIENT
Start: 2023-01-13 | End: 2023-01-13

## 2023-01-13 RX ADMIN — PROPOFOL 30 MG: 10 INJECTION, EMULSION INTRAVENOUS at 08:36

## 2023-01-13 RX ADMIN — PROPOFOL 140 MG: 10 INJECTION, EMULSION INTRAVENOUS at 08:31

## 2023-01-13 RX ADMIN — PROPOFOL 30 MG: 10 INJECTION, EMULSION INTRAVENOUS at 08:34

## 2023-01-13 RX ADMIN — PROPOFOL 20 MG: 10 INJECTION, EMULSION INTRAVENOUS at 08:41

## 2023-01-13 RX ADMIN — PROPOFOL 120 MCG/KG/MIN: 10 INJECTION, EMULSION INTRAVENOUS at 08:31

## 2023-01-13 RX ADMIN — LIDOCAINE HYDROCHLORIDE 60 MG: 20 INJECTION, SOLUTION EPIDURAL; INFILTRATION; INTRACAUDAL at 08:31

## 2023-01-13 RX ADMIN — SODIUM CHLORIDE 125 ML/HR: 0.9 INJECTION, SOLUTION INTRAVENOUS at 08:13

## 2023-01-13 RX ADMIN — Medication 40 MG: at 08:33

## 2023-01-13 RX ADMIN — PROPOFOL 30 MG: 10 INJECTION, EMULSION INTRAVENOUS at 08:39

## 2023-01-13 NOTE — ANESTHESIA PREPROCEDURE EVALUATION
Procedure:  COLONOSCOPY    Relevant Problems   CARDIO   (+) HTN (hypertension)   (+) Hypercholesterolemia      GI/HEPATIC   (+) GERD (gastroesophageal reflux disease)      NEURO/PSYCH   (+) Encounter for follow-up surveillance of melanoma   (+) Personal history of malignant melanoma        Physical Exam    Airway    Mallampati score: II  TM Distance: >3 FB  Neck ROM: full     Dental   No notable dental hx     Cardiovascular  Rhythm: regular, Rate: normal, Cardiovascular exam normal    Pulmonary  Breath sounds clear to auscultation,     Other Findings        Anesthesia Plan  ASA Score- 2     Anesthesia Type- IV sedation with anesthesia with ASA Monitors  Additional Monitors:   Airway Plan:           Plan Factors-    Chart reviewed  Patient summary reviewed  Induction-     Postoperative Plan-     Informed Consent- Anesthetic plan and risks discussed with patient

## 2023-01-13 NOTE — H&P
History and Physical - [facility]  Belkis Valdivia 79 y o  male MRN: [id number]                  HPI: Belkis Valdivia is a 79y o  year old male who presents for screening colonoscopy  REVIEW OF SYSTEMS: Per the HPI, and otherwise unremarkable  Historical Information   Past Medical History:   Diagnosis Date   • Allergic rhinitis     Resolved 1/14/2016    • Allergy     Mild; spring and fall   • Colon polyp    • Erectile dysfunction of nonorganic origin     Resolved 1/14/2016    • GERD (gastroesophageal reflux disease)    • Hyperlipidemia    • Rosacea     Resolved 1/14/2016      Past Surgical History:   Procedure Laterality Date   • COLONOSCOPY     • FACIAL/NECK BIOPSY Right 1/9/2018    Procedure: CHEEK and LOWER EYELID BCC EXCISION AND COMPLEX CLOSURE;  Surgeon: Tosha Flores MD;  [facility];  Service: Plastics   • FL INJECTION LEFT HIP (NON ARTHROGRAM)  2/7/2022   • LYMPH NODE BIOPSY Left 2/15/2022    Procedure: BIOPSY LYMPH NODE SENTINEL (NUC MED INJECTION AT 1300);   Surgeon: Wing Raymond MD;  [facility];  Service: Surgical Oncology   • SKIN LESION EXCISION Left 2/15/2022    Procedure: UPPER BACK LESION WIDE EXCISION;  Surgeon: Wing Raymond MD;  [facility];  Service: Surgical Oncology   • WISDOM TOOTH EXTRACTION       Social History   Social History     Substance and Sexual Activity   Alcohol Use Yes   • Alcohol/week: 1 0 standard drink   • Types: 1 Shots of liquor per week    Comment: occasionally     Social History     Substance and Sexual Activity   Drug Use No     Social History     Tobacco Use   Smoking Status Light Smoker   • Types: Cigars   Smokeless Tobacco Never   Tobacco Comments    cigar 6 times a year     Family History   Problem Relation Age of Onset   • Skin cancer Mother    • No Known Problems Father        Meds/Allergies       Current Outpatient Medications:   •  atorvastatin (LIPITOR) 20 mg tablet  •  losartan (COZAAR) 100 MG tablet  •  omeprazole (PriLOSEC) 40 MG capsule    Current Facility-Administered Medications:   •  sodium chloride 0 9 % infusion, 125 mL/hr, Intravenous, Continuous    Allergies   Allergen Reactions   • Neomycin-Polymyxin-Dexameth      Annotation - [id number]: skin peeled around eyes   • Pollen Extract        Objective     There were no vitals taken for this visit  PHYSICAL EXAM    Gen: NAD  Head: NCAT  CV: RRR  CHEST: Clear  ABD: soft, NT/ND  EXT: no edema      ASSESSMENT/PLAN:  This is a 79y o  year old male here for colonoscopy and he is stable and optimized for his procedure

## 2023-01-13 NOTE — ANESTHESIA POSTPROCEDURE EVALUATION
Post-Op Assessment Note    CV Status:  Stable    Pain management: adequate     Mental Status:  Alert and awake   Hydration Status:  Euvolemic   PONV Controlled:  Controlled   Airway Patency:  Patent      Post Op Vitals Reviewed: Yes      Staff: Anesthesiologist         No notable events documented      BP      Temp     Pulse     Resp      SpO2      /69   Pulse 85   Temp (!) 97 2 °F (36 2 °C) (Temporal)   Resp 18   Ht 6' (1 829 m)   Wt 97 5 kg (215 lb)   SpO2 94%   BMI 29 16 kg/m²

## 2023-01-16 ENCOUNTER — TELEPHONE (OUTPATIENT)
Dept: HEMATOLOGY ONCOLOGY | Facility: CLINIC | Age: 68
End: 2023-01-16

## 2023-02-09 DIAGNOSIS — E78.2 MIXED HYPERLIPIDEMIA: ICD-10-CM

## 2023-02-09 RX ORDER — ATORVASTATIN CALCIUM 20 MG/1
20 TABLET, FILM COATED ORAL DAILY
Qty: 90 TABLET | Refills: 1 | Status: SHIPPED | OUTPATIENT
Start: 2023-02-09

## 2023-02-24 DIAGNOSIS — I10 ESSENTIAL HYPERTENSION: ICD-10-CM

## 2023-02-24 RX ORDER — LOSARTAN POTASSIUM 100 MG/1
100 TABLET ORAL DAILY
Qty: 90 TABLET | Refills: 1 | Status: SHIPPED | OUTPATIENT
Start: 2023-02-24

## 2023-02-28 ENCOUNTER — TELEPHONE (OUTPATIENT)
Dept: HEMATOLOGY ONCOLOGY | Facility: MEDICAL CENTER | Age: 68
End: 2023-02-28

## 2023-02-28 NOTE — TELEPHONE ENCOUNTER
Spoke with patient advised Dr Bryce Hinojosa will not be in office on 3/9 I was calling to r/s appointment  He stated he will need to call back and r/s he needs to review his schedule and see when he is available  Provided him with [phone] to r/s appt

## 2023-03-16 ENCOUNTER — HOSPITAL ENCOUNTER (OUTPATIENT)
Dept: ULTRASOUND IMAGING | Facility: HOSPITAL | Age: 68
Discharge: HOME | End: 2023-03-16
Attending: STUDENT IN AN ORGANIZED HEALTH CARE EDUCATION/TRAINING PROGRAM

## 2023-03-16 DIAGNOSIS — Z08 ENCOUNTER FOR FOLLOW-UP SURVEILLANCE OF MELANOMA: ICD-10-CM

## 2023-03-16 DIAGNOSIS — Z85.820 ENCOUNTER FOR FOLLOW-UP SURVEILLANCE OF MELANOMA: ICD-10-CM

## 2023-03-16 DIAGNOSIS — Z85.820 PERSONAL HISTORY OF MALIGNANT MELANOMA: ICD-10-CM

## 2023-06-02 ENCOUNTER — TELEPHONE (OUTPATIENT)
Dept: INTERNAL MEDICINE CLINIC | Age: 68
End: 2023-06-02

## 2023-06-02 NOTE — LETTER
Date: 6/2/2023    Carmencita Young  [address] [place] [phone]    Dear Carmencita Young,      We have attempted to reach you regarding your upcoming appointment on 6/20/23 with Dr Bruno Tracy  Unfortunately, due to a change in the provider's schedule we need to change your appointment  Please call our office as soon as possible so we can reschedule your appointment  We apologize for any inconvenience this may cause  Thank you in advance for your cooperation and assistance            Sincerely,      [facility]

## 2023-06-07 ENCOUNTER — TELEPHONE (OUTPATIENT)
Dept: HEMATOLOGY ONCOLOGY | Facility: CLINIC | Age: 68
End: 2023-06-07

## 2023-06-07 NOTE — TELEPHONE ENCOUNTER
Appointment Change  Cancel, Reschedule, Change to Virtual      Who are you speaking with? Patient   If it is not the patient, are they listed on an active communication consent form? N/A   Which provider is the appointment scheduled with? Dr Ricco Rao   When is the appointment scheduled? Please list date and time  3/28/23 @ 8:30am   At which location is the appointment scheduled to take place? [facility]   Was the appointment rescheduled or changed from an in person visit to a virtual visit? If so, please list the details of the change  6/27/23 @ 11am   What is the reason for the appointment change? Dr Ricco Rao was out of office and patient needed  to reschedule   Was [facility]AR transport scheduled for this visit? no   Does STAR transport need to be scheduled for the new visit (if applicable) no   Does the patient need an infusion appointment rescheduled? no   Does the patient have an infusion appointment scheduled? If so, when? no   Is the patient undergoing chemotherapy? no   Was the no-show policy reviewed for appointments being changed with less then 24 hours of notice?  yes

## 2023-06-07 NOTE — TELEPHONE ENCOUNTER
Appointment Change  Cancel, Reschedule, Change to Virtual      Who are you speaking with? Patient   If it is not the patient, are they listed on an active communication consent form? N/A   Which provider is the appointment scheduled with? Dr Jagdeep Mckeon   When is the appointment scheduled? Please list date and time  3/9/23 @ 8am   At which location is the appointment scheduled to take place? [facility]   Was the appointment rescheduled or changed from an in person visit to a virtual visit? If so, please list the details of the change  Yes 6/19/23 @ 8:20am   What is the reason for the appointment change? Patient needed to reschedule   Was STAR transport scheduled for this visit? no   Does STAR transport need to be scheduled for the new visit (if applicable) no   Does the patient need an infusion appointment rescheduled? no   Does the patient have an infusion appointment scheduled? If so, when? no   Is the patient undergoing chemotherapy? no   Was the no-show policy reviewed for appointments being changed with less then 24 hours of notice?  yes

## 2023-06-14 ENCOUNTER — TELEPHONE (OUTPATIENT)
Dept: HEMATOLOGY ONCOLOGY | Facility: CLINIC | Age: 68
End: 2023-06-14

## 2023-06-15 ENCOUNTER — APPOINTMENT (OUTPATIENT)
Dept: LAB | Age: 68
End: 2023-06-15
Payer: MEDICARE

## 2023-06-15 DIAGNOSIS — C43.59 MALIGNANT MELANOMA OF UPPER BACK (HCC): ICD-10-CM

## 2023-06-15 DIAGNOSIS — Z11.59 ENCOUNTER FOR HEPATITIS C SCREENING TEST FOR LOW RISK PATIENT: ICD-10-CM

## 2023-06-15 DIAGNOSIS — C43.59 MALIGNANT MELANOMA OF TORSO EXCLUDING BREAST (HCC): ICD-10-CM

## 2023-06-15 DIAGNOSIS — Z12.5 SCREENING FOR PROSTATE CANCER: ICD-10-CM

## 2023-06-15 DIAGNOSIS — I10 PRIMARY HYPERTENSION: ICD-10-CM

## 2023-06-15 LAB
ALBUMIN SERPL BCP-MCNC: 3.8 G/DL (ref 3.5–5)
ALP SERPL-CCNC: 57 U/L (ref 46–116)
ALT SERPL W P-5'-P-CCNC: 29 U/L (ref 12–78)
ANION GAP SERPL CALCULATED.3IONS-SCNC: 5 MMOL/L (ref 4–13)
AST SERPL W P-5'-P-CCNC: 16 U/L (ref 5–45)
BASOPHILS # BLD AUTO: 0.04 THOUSANDS/ÂΜL (ref 0–0.1)
BASOPHILS NFR BLD AUTO: 1 % (ref 0–1)
BILIRUB SERPL-MCNC: 0.94 MG/DL (ref 0.2–1)
BUN SERPL-MCNC: 19 MG/DL (ref 5–25)
CALCIUM SERPL-MCNC: 9.3 MG/DL (ref 8.3–10.1)
CHLORIDE SERPL-SCNC: 110 MMOL/L (ref 96–108)
CO2 SERPL-SCNC: 23 MMOL/L (ref 21–32)
CREAT SERPL-MCNC: 1.21 MG/DL (ref 0.6–1.3)
EOSINOPHIL # BLD AUTO: 0.1 THOUSAND/ÂΜL (ref 0–0.61)
EOSINOPHIL NFR BLD AUTO: 2 % (ref 0–6)
ERYTHROCYTE [DISTWIDTH] IN BLOOD BY AUTOMATED COUNT: 12.8 % (ref 11.6–15.1)
GFR SERPL CREATININE-BSD FRML MDRD: 61 ML/MIN/1.73SQ M
GLUCOSE P FAST SERPL-MCNC: 104 MG/DL (ref 65–99)
HCT VFR BLD AUTO: 45.7 % (ref 36.5–49.3)
HCV AB SER QL: NORMAL
HGB BLD-MCNC: 15 G/DL (ref 12–17)
IMM GRANULOCYTES # BLD AUTO: 0.01 THOUSAND/UL (ref 0–0.2)
IMM GRANULOCYTES NFR BLD AUTO: 0 % (ref 0–2)
LDH SERPL-CCNC: 197 U/L (ref 81–234)
LYMPHOCYTES # BLD AUTO: 1.52 THOUSANDS/ÂΜL (ref 0.6–4.47)
LYMPHOCYTES NFR BLD AUTO: 29 % (ref 14–44)
MCH RBC QN AUTO: 31.3 PG (ref 26.8–34.3)
MCHC RBC AUTO-ENTMCNC: 32.8 G/DL (ref 31.4–37.4)
MCV RBC AUTO: 95 FL (ref 82–98)
MONOCYTES # BLD AUTO: 0.5 THOUSAND/ÂΜL (ref 0.17–1.22)
MONOCYTES NFR BLD AUTO: 10 % (ref 4–12)
NEUTROPHILS # BLD AUTO: 3.04 THOUSANDS/ÂΜL (ref 1.85–7.62)
NEUTS SEG NFR BLD AUTO: 58 % (ref 43–75)
NRBC BLD AUTO-RTO: 0 /100 WBCS
PLATELET # BLD AUTO: 188 THOUSANDS/UL (ref 149–390)
PMV BLD AUTO: 11.3 FL (ref 8.9–12.7)
POTASSIUM SERPL-SCNC: 4.1 MMOL/L (ref 3.5–5.3)
PROT SERPL-MCNC: 7.1 G/DL (ref 6.4–8.4)
PSA SERPL-MCNC: 0.87 NG/ML (ref 0–4)
RBC # BLD AUTO: 4.8 MILLION/UL (ref 3.88–5.62)
SODIUM SERPL-SCNC: 138 MMOL/L (ref 135–147)
TSH SERPL DL<=0.05 MIU/L-ACNC: 2.05 UIU/ML (ref 0.45–4.5)
WBC # BLD AUTO: 5.21 THOUSAND/UL (ref 4.31–10.16)

## 2023-06-15 PROCEDURE — 36415 COLL VENOUS BLD VENIPUNCTURE: CPT

## 2023-06-15 PROCEDURE — 84443 ASSAY THYROID STIM HORMONE: CPT

## 2023-06-15 PROCEDURE — 83615 LACTATE (LD) (LDH) ENZYME: CPT

## 2023-06-15 PROCEDURE — 80053 COMPREHEN METABOLIC PANEL: CPT

## 2023-06-15 PROCEDURE — G0103 PSA SCREENING: HCPCS

## 2023-06-15 PROCEDURE — 85025 COMPLETE CBC W/AUTO DIFF WBC: CPT

## 2023-06-15 PROCEDURE — 86803 HEPATITIS C AB TEST: CPT

## 2023-06-19 ENCOUNTER — OFFICE VISIT (OUTPATIENT)
Dept: HEMATOLOGY ONCOLOGY | Facility: CLINIC | Age: 68
End: 2023-06-19
Payer: MEDICARE

## 2023-06-19 VITALS
BODY MASS INDEX: 29.12 KG/M2 | HEART RATE: 80 BPM | HEIGHT: 72 IN | WEIGHT: 215 LBS | DIASTOLIC BLOOD PRESSURE: 76 MMHG | OXYGEN SATURATION: 98 % | SYSTOLIC BLOOD PRESSURE: 118 MMHG | RESPIRATION RATE: 20 BRPM

## 2023-06-19 DIAGNOSIS — Z85.820 ENCOUNTER FOR FOLLOW-UP SURVEILLANCE OF MELANOMA: ICD-10-CM

## 2023-06-19 DIAGNOSIS — C43.59 MALIGNANT MELANOMA OF TORSO EXCLUDING BREAST (HCC): ICD-10-CM

## 2023-06-19 DIAGNOSIS — C43.59 MALIGNANT MELANOMA OF UPPER BACK (HCC): Primary | ICD-10-CM

## 2023-06-19 DIAGNOSIS — Z08 ENCOUNTER FOR FOLLOW-UP SURVEILLANCE OF MELANOMA: ICD-10-CM

## 2023-06-19 DIAGNOSIS — Z85.820 PERSONAL HISTORY OF MALIGNANT MELANOMA: ICD-10-CM

## 2023-06-19 PROCEDURE — 99213 OFFICE O/P EST LOW 20 MIN: CPT | Performed by: INTERNAL MEDICINE

## 2023-06-19 NOTE — LETTER
July 23, 2023     Davi Medina, [address] [facility]    Patient: Dominique Lopez   YOB: 1955   Date of Visit: 6/19/2023       Dear Dr. Zelaya Wallace:    Thank you for referring Dominique Lopez to me for evaluation. Below are my notes for this consultation. If you have questions, please do not hesitate to call me. I look forward to following your patient along with you. Sincerely,        Eliot Dial MD        CC: RODNEY Cevallos MD Susana Link., MD Tommy Meiers, MD  7/23/2023  6:55 PM  Sign when Signing Visit  [address] [facility] [address]  [address]  [phone]     Date of Visit: 6/19/2023  Name: Dominique Lopez   YOB: 1955        Subjective    VISIT DIAGNOSIS:  Diagnoses and all orders for this visit:    Malignant melanoma of upper back ([address])  -     CBC and differential; Future  -     Comprehensive metabolic panel; Future  -     LD,Blood; Future    Malignant melanoma of torso excluding breast (HCC)  -     CBC and differential; Future  -     Comprehensive metabolic panel; Future  -     LD,Blood; Future    Personal history of malignant melanoma    Encounter for follow-up surveillance of melanoma        Oncology History   Personal history of malignant melanoma   12/20/2021 Biopsy    Left upper back Melamona: shave biopsy  Thickness: 0.8mm  Ulceration: none   Mitoses: 0  Margins: Approaches both lateral margins and extends to the deep margin      12/20/2021 -  Cancer Staged    Staging form: Melanoma of the Skin, AJCC 8th Edition  - Clinical stage from 12/20/2021: Stage IB (cT1b, cN0, cM0) - Signed by Eliot Dial MD on 1/27/2022 1/17/2022 Initial Diagnosis    Malignant melanoma of upper back ([address])     2/15/2022 Surgery    A. Lymph node, sentinel, left neck #1:  Two lymph nodes, negative for metastatic melanoma (0/2).     B. Lymph node, sentinel, left neck #2:  Two lymph nodes, negative for metastatic melanoma (0/2). C. Skin and soft tissue, upper left back, wide excision: Scar. Cancer Staging   Personal history of malignant melanoma  Staging form: Melanoma of the Skin, AJCC 8th Edition  - Clinical stage from 12/20/2021: Stage IB (cT1b, cN0, cM0) - Signed by Tata Vaca MD on 1/27/2022     [No matching plan found]     HISTORY OF PRESENT ILLNESS: Dinesh Nolen is a 76 y.o. male  who stage Ib melanoma here for continued monitoring, follow-up, and surveillance. He is doing well and feels good. No concerns or complaints at this visit. Energy good. Eating well. Denies any new, changing, concerning skin lesions. Denies any lymphadenopathy. REVIEW OF SYSTEMS:  Review of Systems   Constitutional: Negative for appetite change, fatigue, fever and unexpected weight change. HENT:   Negative for lump/mass, trouble swallowing and voice change. Eyes: Negative for icterus. Respiratory: Negative for cough, shortness of breath and wheezing. Cardiovascular: Negative for leg swelling. Gastrointestinal: Negative for abdominal pain, constipation, diarrhea, nausea and vomiting. Genitourinary: Negative for difficulty urinating and hematuria. Musculoskeletal: Negative for arthralgias, gait problem and myalgias. Skin: Negative for itching and rash. No new, changing, or concerning lesions. Neurological: Negative for extremity weakness, gait problem, headaches, light-headedness and numbness. Hematological: Negative for adenopathy. Psychiatric/Behavioral: The patient is not nervous/anxious.          MEDICATIONS:    Current Outpatient Medications:   •  atorvastatin (LIPITOR) 20 mg tablet, Take 1 tablet (20 mg total) by mouth daily, Disp: 90 tablet, Rfl: 1  •  losartan (COZAAR) 100 MG tablet, Take 1 tablet (100 mg total) by mouth daily, Disp: 90 tablet, Rfl: 1  •  omeprazole (PriLOSEC) 40 MG capsule, Take 40 mg by mouth daily Every other day, Disp: , Rfl:      ALLERGIES:  Allergies   Allergen Reactions   • Neomycin-Polymyxin-Dexameth      Annotation - [id number]: skin peeled around eyes   • Pollen Extract         ACTIVE PROBLEMS:  Patient Active Problem List   Diagnosis   • HTN (hypertension)   • Hypercholesterolemia   • Numerous moles   • GERD (gastroesophageal reflux disease)   • BMI 28.0-28.9,adult   • Personal history of malignant melanoma   • Encounter for follow-up surveillance of melanoma          PAST MEDICAL HISTORY:   Past Medical History:   Diagnosis Date   • Allergic rhinitis     Resolved 1/14/2016    • Allergy     Mild; spring and fall   • Cancer [facility])     melanoma   • Colon polyp    • Diverticulitis    • Erectile dysfunction of nonorganic origin     Resolved 1/14/2016    • GERD (gastroesophageal reflux disease)    • Hyperlipidemia    • Hypertension    • Rosacea     Resolved 1/14/2016         PAST SURGICAL HISTORY:  Past Surgical History:   Procedure Laterality Date   • COLONOSCOPY     • FACIAL/NECK BIOPSY Right 01/09/2018    Procedure: CHEEK and LOWER EYELID BCC EXCISION AND COMPLEX CLOSURE;  Surgeon: Gloria Palomo MD;  [facility];  Service: Plastics   • FL INJECTION LEFT HIP (NON ARTHROGRAM)  02/07/2022   • LYMPH NODE BIOPSY Left 02/15/2022    Procedure: BIOPSY LYMPH NODE SENTINEL (NUC MED INJECTION AT 1300);   Surgeon: Clemente Matos MD;  Location[facility];  Service: Surgical Oncology   • SKIN BIOPSY     • SKIN LESION EXCISION Left 02/15/2022    Procedure: UPPER BACK LESION WIDE EXCISION;  Surgeon: Clemente Matos MD;  [facility];  Service: Surgical Oncology   • WISDOM TOOTH EXTRACTION          SOCIAL HISTORY:  Social History     Socioeconomic History   • Marital status: /Civil Union     Spouse name: None   • Number of children: None   • Years of education: None   • Highest education level: None   Occupational History   • None   Tobacco Use   • Smoking status: Light Smoker     Types: Cigars   • Smokeless tobacco: Never   • Tobacco comments:     cigar 6 times a year   Vaping Use   • Vaping Use: Never used   Substance and Sexual Activity   • Alcohol use: Yes     Alcohol/week: 1.0 standard drink of alcohol     Types: 1 Shots of liquor per week     Comment: occasionally   • Drug use: No   • Sexual activity: None   Other Topics Concern   • None   Social History Narrative    Former smoker - As per [employer]    Never used drugs - As per [employer]      Social Determinants of Health     Financial Resource Strain: Low Risk  (12/13/2022)    Overall Financial Resource Strain (CARDIA)    • Difficulty of Paying Living Expenses: Not very hard   Food Insecurity: Not on file   Transportation Needs: No Transportation Needs (12/13/2022)    PRAPARE - Transportation    • Lack of Transportation (Medical): No    • Lack of Transportation (Non-Medical): No   Physical Activity: Not on file   Stress: Not on file   Social Connections: Not on file   Intimate Partner Violence: Not on file   Housing Stability: Not on file        FAMILY HISTORY:  Family History   Problem Relation Age of Onset   • Skin cancer Mother    • No Known Problems Father            Objective    PHYSICAL EXAMINATION:   Blood pressure 118/76, pulse 80, resp. rate 20, height 6' (1.829 m), weight 97.5 kg (215 lb), SpO2 98 %. ECOG Performance Status      Flowsheet Row Most Recent Value   ECOG Performance Status 0 - Fully active, able to carry on all pre-disease performance without restriction               Physical Exam  Constitutional:       General: He is not in acute distress. Appearance: Normal appearance. He is not toxic-appearing. HENT:      Head: Normocephalic and atraumatic. Right Ear: External ear normal.      Left Ear: External ear normal.      Nose: Nose normal.      Mouth/Throat:      Mouth: Mucous membranes are moist.      Pharynx: Oropharynx is clear. Eyes:      General: No scleral icterus.         Right eye: No discharge. Left eye: No discharge. Conjunctiva/sclera: Conjunctivae normal.   Cardiovascular:      Rate and Rhythm: Normal rate and regular rhythm. Pulses: Normal pulses. Heart sounds: No murmur heard. No friction rub. No gallop. Pulmonary:      Effort: Pulmonary effort is normal. No respiratory distress. Breath sounds: Normal breath sounds. No wheezing or rales. Abdominal:      General: Bowel sounds are normal. There is no distension. Palpations: There is no mass. Tenderness: There is no abdominal tenderness. There is no rebound. Musculoskeletal:         General: No swelling or tenderness. Normal range of motion. Cervical back: Normal range of motion. No rigidity. Right lower leg: No edema. Left lower leg: No edema. Lymphadenopathy:      Head:      Right side of head: No submandibular, preauricular or posterior auricular adenopathy. Left side of head: No submandibular, preauricular or posterior auricular adenopathy. Cervical: No cervical adenopathy. Right cervical: No superficial or posterior cervical adenopathy. Left cervical: No superficial or posterior cervical adenopathy. Upper Body:      Right upper body: No supraclavicular or axillary adenopathy. Left upper body: No supraclavicular or axillary adenopathy. Lower Body: No right inguinal adenopathy. No left inguinal adenopathy. Skin:     Coloration: Skin is not jaundiced. Findings: No lesion or rash. Comments: Well healed surgical scar. No evidence of recurrence at primary site. Neurological:      General: No focal deficit present. Mental Status: He is alert and oriented to person, place, and time. Cranial Nerves: No cranial nerve deficit. Motor: No weakness. Gait: Gait normal.   Psychiatric:         Mood and Affect: Mood normal.         Behavior: Behavior normal.         Thought Content:  Thought content normal. Judgment: Judgment normal.         I reviewed lab data in the chart. WBC   Date Value Ref Range Status   06/15/2023 5.21 4.31 - 10.16 Thousand/uL Final   09/06/2022 6.54 4.31 - 10.16 Thousand/uL Final   01/27/2022 5.32 4.31 - 10.16 Thousand/uL Final     Hemoglobin   Date Value Ref Range Status   06/15/2023 15.0 12.0 - 17.0 g/dL Final   09/06/2022 15.4 12.0 - 17.0 g/dL Final   01/27/2022 15.0 12.0 - 17.0 g/dL Final     Platelets   Date Value Ref Range Status   06/15/2023 188 149 - 390 Thousands/uL Final   09/06/2022 178 149 - 390 Thousands/uL Final   01/27/2022 196 149 - 390 Thousands/uL Final     MCV   Date Value Ref Range Status   06/15/2023 95 82 - 98 fL Final   09/06/2022 94 82 - 98 fL Final   01/27/2022 91 82 - 98 fL Final      Potassium   Date Value Ref Range Status   06/15/2023 4.1 3.5 - 5.3 mmol/L Final   09/06/2022 3.7 3.5 - 5.3 mmol/L Final   01/27/2022 4.2 3.5 - 5.3 mmol/L Final     Chloride   Date Value Ref Range Status   06/15/2023 110 (H) 96 - 108 mmol/L Final   09/06/2022 104 96 - 108 mmol/L Final   01/27/2022 106 100 - 108 mmol/L Final     CO2   Date Value Ref Range Status   06/15/2023 23 21 - 32 mmol/L Final   09/06/2022 26 21 - 32 mmol/L Final   01/27/2022 27 21 - 32 mmol/L Final     BUN   Date Value Ref Range Status   06/15/2023 19 5 - 25 mg/dL Final   09/06/2022 19 5 - 25 mg/dL Final   01/27/2022 22 5 - 25 mg/dL Final     Creatinine   Date Value Ref Range Status   06/15/2023 1.21 0.60 - 1.30 mg/dL Final     Comment:     Standardized to IDMS reference method   09/06/2022 1.13 0.60 - 1.30 mg/dL Final     Comment:     Standardized to IDMS reference method   01/27/2022 1.28 0.60 - 1.30 mg/dL Final     Comment:     Standardized to IDMS reference method     Glucose   Date Value Ref Range Status   11/04/2021 94 65 - 140 mg/dL Final     Comment:     If the patient is fasting, the ADA then defines impaired fasting glucose as > 100 mg/dL and diabetes as > or equal to 123 mg/dL.   Specimen collection should occur prior to Sulfasalazine administration due to the potential for falsely depressed results. Specimen collection should occur prior to Sulfapyridine administration due to the potential for falsely elevated results. Calcium   Date Value Ref Range Status   06/15/2023 9.3 8.3 - 10.1 mg/dL Final   09/06/2022 9.0 8.3 - 10.1 mg/dL Final   01/27/2022 9.9 8.3 - 10.1 mg/dL Final     Albumin   Date Value Ref Range Status   06/15/2023 3.8 3.5 - 5.0 g/dL Final   09/06/2022 4.0 3.5 - 5.0 g/dL Final   01/27/2022 3.8 3.5 - 5.0 g/dL Final     Total Bilirubin   Date Value Ref Range Status   06/15/2023 0.94 0.20 - 1.00 mg/dL Final     Comment:     Use of this assay is not recommended for patients undergoing treatment with eltrombopag due to the potential for falsely elevated results. 09/06/2022 1.08 (H) 0.20 - 1.00 mg/dL Final     Comment:     Use of this assay is not recommended for patients undergoing treatment with eltrombopag due to the potential for falsely elevated results. 01/27/2022 0.73 0.20 - 1.00 mg/dL Final     Comment:     Use of this assay is not recommended for patients undergoing treatment with eltrombopag due to the potential for falsely elevated results. Alkaline Phosphatase   Date Value Ref Range Status   06/15/2023 57 46 - 116 U/L Final   09/06/2022 56 46 - 116 U/L Final   01/27/2022 65 46 - 116 U/L Final     AST   Date Value Ref Range Status   06/15/2023 16 5 - 45 U/L Final     Comment:     Specimen collection should occur prior to Sulfasalazine administration due to the potential for falsely depressed results. 09/06/2022 18 5 - 45 U/L Final     Comment:     Specimen collection should occur prior to Sulfasalazine administration due to the potential for falsely depressed results. 01/27/2022 17 5 - 45 U/L Final     Comment:     Specimen collection should occur prior to Sulfasalazine administration due to the potential for falsely depressed results.       ALT   Date Value Ref Range Status 06/15/2023 29 12 - 78 U/L Final     Comment:     Specimen collection should occur prior to Sulfasalazine and/or Sulfapyridine administration due to the potential for falsely depressed results. 09/06/2022 29 12 - 78 U/L Final     Comment:     Specimen collection should occur prior to Sulfasalazine administration due to the potential for falsely depressed results. 01/27/2022 31 12 - 78 U/L Final     Comment:     Specimen collection should occur prior to Sulfasalazine and/or Sulfapyridine administration due to the potential for falsely depressed results. LD   Date Value Ref Range Status   06/15/2023 197 81 - 234 U/L Final   09/06/2022 157 81 - 234 U/L Final   01/27/2022 186 81 - 234 U/L Final     No results found for: "TSH"  No results found for: "Z2GDJNE"   No results found for: "FREET4"      RECENT IMAGING:  No results found. Assessment/Plan  Mr. Marilyn Guan is a 76 yr male with stage Ib melanoma here for continued monitoring, follow-up, and surveillance. 1. Malignant melanoma of upper back ([address])  2. Malignant melanoma of torso excluding breast (HCC)  3. Personal history of malignant melanoma  4. Encounter for follow-up surveillance of melanoma  He is doing well and has no clinical evidence of melanoma recurrence or metastasis. Labs reviewed and okay. He knows to continue to monitor for any changes or concerning skin lesions. He knows to call with any issues or concerns prior to his next visit. He will follow-up in 6 months. At that time we will blood work and orders were placed and prescription provided. He continues to follow-up with surgical oncology as well. We discussed continued dermatology follow-up as well as safe sun practices now that we are heading into the summertime.      - CBC and differential; Future  - Comprehensive metabolic panel; Future  - LD,Blood; Future          Return in about 6 months (around 12/19/2023) for Office Visit, labs.      Ferny Kauffman MD, PhD

## 2023-06-20 ENCOUNTER — TELEPHONE (OUTPATIENT)
Dept: HEMATOLOGY ONCOLOGY | Facility: CLINIC | Age: 68
End: 2023-06-20

## 2023-06-20 NOTE — TELEPHONE ENCOUNTER
I called Nathalia Murray regarding an appointment that they have scheduled with Dr Kiersten Hill scheduled on 06/27/23     I left a voicemail explaining to patient that the provider will be out of the office on this date and will need to reschedule the visit  I encouraged patient to call [facility] at [phone] to reschedule  A [employer] message (if applicable) has been sent to patient relaying the above information and advising patient to call [facility] and reschedule their appointment

## 2023-06-21 ENCOUNTER — TELEPHONE (OUTPATIENT)
Dept: HEMATOLOGY ONCOLOGY | Facility: CLINIC | Age: 68
End: 2023-06-21

## 2023-06-21 NOTE — TELEPHONE ENCOUNTER
I called Tamela Tolbert regarding an appointment that they have scheduled with Dr Elan Lucas scheduled on 6/27/23  I left a voicemail explaining to patient that the provider will be out of the office on this date and will need to reschedule the visit  I encouraged patient to call [facility] at [phone] to reschedule  A [employer] message (if applicable) has been sent to patient relaying the above information and advising patient to call [facility] and reschedule their appointment

## 2023-06-22 ENCOUNTER — TELEPHONE (OUTPATIENT)
Dept: HEMATOLOGY ONCOLOGY | Facility: CLINIC | Age: 68
End: 2023-06-22

## 2023-06-22 NOTE — TELEPHONE ENCOUNTER
Appointment Change  Cancel, Reschedule, Change to Virtual      Who are you speaking with? Patient   If it is not the patient, are they listed on an active communication consent form? N/A   Which provider is the appointment scheduled with? Dr Melonie Opitz   When is the appointment scheduled? Please list date and time 6/27/23 11:00AM   At which location is the appointment scheduled to take place? [facility]   Was the appointment rescheduled or changed from an in person visit to a virtual visit? If so, please list the details of the change  7/17/23 8:45AM   What is the reason for the appointment change? Provider   Was STAR transport scheduled for this visit? No   Does STAR transport need to be scheduled for the new visit (if applicable) No   Does the patient need an infusion appointment rescheduled? No   Does the patient have an infusion appointment scheduled? If so, when? No   Is the patient undergoing chemotherapy? No   Was the no-show policy reviewed for appointments being changed with less then 24 hours of notice?  No

## 2023-06-22 NOTE — TELEPHONE ENCOUNTER
Called patient and left message to reschedule follow up with Dr Inder Lucas scheduled on 6/27/23  I provided Surgical Oncology's phone number to reschedule

## 2023-06-28 ENCOUNTER — TELEPHONE (OUTPATIENT)
Dept: HEMATOLOGY ONCOLOGY | Facility: CLINIC | Age: 68
End: 2023-06-28

## 2023-06-28 NOTE — TELEPHONE ENCOUNTER
Appointment Change  Cancel, Reschedule, Change to Virtual      Who are you speaking with? Patient   If it is not the patient, are they listed on an active communication consent form? N/A   Which provider is the appointment scheduled with? Dr Francesco Doyle   When is the appointment scheduled? Please list date and time 7/17/23 8:45AM   At which location is the appointment scheduled to take place? [facility]   Was the appointment rescheduled or changed from an in person visit to a virtual visit? If so, please list the details of the change  7/25/23 10:45AM   What is the reason for the appointment change? Patient states Mondays do not work with his schedule   Was STAR transport scheduled for this visit? No   Does STAR transport need to be scheduled for the new visit (if applicable) No   Does the patient need an infusion appointment rescheduled? No   Does the patient have an infusion appointment scheduled? If so, when? No   Is the patient undergoing chemotherapy? No   Was the no-show policy reviewed for appointments being changed with less then 24 hours of notice?  Yes

## 2023-07-20 ENCOUNTER — RA CDI HCC (OUTPATIENT)
Dept: OTHER | Facility: HOSPITAL | Age: 68
End: 2023-07-20

## 2023-07-23 NOTE — PROGRESS NOTES
[facility]  [address]  [place] [address]  [phone]  [phone]     Date of Visit: 6/19/2023  Name: Megan Holliday   YOB: 1955        Subjective    VISIT DIAGNOSIS:  Diagnoses and all orders for this visit:    Malignant melanoma of upper back ([address])  -     CBC and differential; Future  -     Comprehensive metabolic panel; Future  -     LD,Blood; Future    Malignant melanoma of torso excluding breast (HCC)  -     CBC and differential; Future  -     Comprehensive metabolic panel; Future  -     LD,Blood; Future    Personal history of malignant melanoma    Encounter for follow-up surveillance of melanoma        Oncology History   Personal history of malignant melanoma   12/20/2021 Biopsy    Left upper back Melamona: shave biopsy  Thickness: 0.8mm  Ulceration: none   Mitoses: 0  Margins: Approaches both lateral margins and extends to the deep margin      12/20/2021 -  Cancer Staged    Staging form: Melanoma of the Skin, AJCC 8th Edition  - Clinical stage from 12/20/2021: Stage IB (cT1b, cN0, cM0) - Signed by Iwona Mejia MD on 1/27/2022 1/17/2022 Initial Diagnosis    Malignant melanoma of upper back ([address])     2/15/2022 Surgery    A. Lymph node, sentinel, left neck #1:  Two lymph nodes, negative for metastatic melanoma (0/2). B. Lymph node, sentinel, left neck #2:  Two lymph nodes, negative for metastatic melanoma (0/2). C. Skin and soft tissue, upper left back, wide excision: Scar. Cancer Staging   Personal history of malignant melanoma  Staging form: Melanoma of the Skin, AJCC 8th Edition  - Clinical stage from 12/20/2021: Stage IB (cT1b, cN0, cM0) - Signed by Iwona Mejia MD on 1/27/2022     [No matching plan found]     HISTORY OF PRESENT ILLNESS: Megan Holliday is a 76 y.o. male  who stage Ib melanoma here for continued monitoring, follow-up, and surveillance. He is doing well and feels good.   No concerns or complaints at this visit. Energy good. Eating well. Denies any new, changing, concerning skin lesions. Denies any lymphadenopathy. REVIEW OF SYSTEMS:  Review of Systems   Constitutional: Negative for appetite change, fatigue, fever and unexpected weight change. HENT:   Negative for lump/mass, trouble swallowing and voice change. Eyes: Negative for icterus. Respiratory: Negative for cough, shortness of breath and wheezing. Cardiovascular: Negative for leg swelling. Gastrointestinal: Negative for abdominal pain, constipation, diarrhea, nausea and vomiting. Genitourinary: Negative for difficulty urinating and hematuria. Musculoskeletal: Negative for arthralgias, gait problem and myalgias. Skin: Negative for itching and rash. No new, changing, or concerning lesions. Neurological: Negative for extremity weakness, gait problem, headaches, light-headedness and numbness. Hematological: Negative for adenopathy. Psychiatric/Behavioral: The patient is not nervous/anxious.          MEDICATIONS:    Current Outpatient Medications:   •  atorvastatin (LIPITOR) 20 mg tablet, Take 1 tablet (20 mg total) by mouth daily, Disp: 90 tablet, Rfl: 1  •  losartan (COZAAR) 100 MG tablet, Take 1 tablet (100 mg total) by mouth daily, Disp: 90 tablet, Rfl: 1  •  omeprazole (PriLOSEC) 40 MG capsule, Take 40 mg by mouth daily Every other day, Disp: , Rfl:      ALLERGIES:  Allergies   Allergen Reactions   • Neomycin-Polymyxin-Dexameth      Annotation - [id number]: skin peeled around eyes   • Pollen Extract         ACTIVE PROBLEMS:  Patient Active Problem List   Diagnosis   • HTN (hypertension)   • Hypercholesterolemia   • Numerous moles   • GERD (gastroesophageal reflux disease)   • BMI 28.0-28.9,adult   • Personal history of malignant melanoma   • Encounter for follow-up surveillance of melanoma          PAST MEDICAL HISTORY:   Past Medical History:   Diagnosis Date   • Allergic rhinitis     Resolved 1/14/2016    • Allergy     Mild; spring and fall   • Cancer [facility])     melanoma   • Colon polyp    • Diverticulitis    • Erectile dysfunction of nonorganic origin     Resolved 1/14/2016    • GERD (gastroesophageal reflux disease)    • Hyperlipidemia    • Hypertension    • Rosacea     Resolved 1/14/2016         PAST SURGICAL HISTORY:  Past Surgical History:   Procedure Laterality Date   • COLONOSCOPY     • FACIAL/NECK BIOPSY Right 01/09/2018    Procedure: CHEEK and LOWER EYELID BCC EXCISION AND COMPLEX CLOSURE;  Surgeon: Lani Rea MD;  Location: [facility];  Service: Plastics   • FL INJECTION LEFT HIP (NON ARTHROGRAM)  02/07/2022   • LYMPH NODE BIOPSY Left 02/15/2022    Procedure: BIOPSY LYMPH NODE SENTINEL (NUC MED INJECTION AT 1300); Surgeon: Eduar Chino MD;  Location: [facility];  Service: Surgical Oncology   • SKIN BIOPSY     • SKIN LESION EXCISION Left 02/15/2022    Procedure: UPPER BACK LESION WIDE EXCISION;  Surgeon: Eduar Chino MD;  Location: [facility];  Service: Surgical Oncology   • WISDOM TOOTH EXTRACTION          SOCIAL HISTORY:  Social History     Socioeconomic History   • Marital status: /Civil Union     Spouse name: None   • Number of children: None   • Years of education: None   • Highest education level: None   Occupational History   • None   Tobacco Use   • Smoking status: Light Smoker     Types: Cigars   • Smokeless tobacco: Never   • Tobacco comments:     cigar 6 times a year   Vaping Use   • Vaping Use: Never used   Substance and Sexual Activity   • Alcohol use:  Yes     Alcohol/week: 1.0 standard drink of alcohol     Types: 1 Shots of liquor per week     Comment: occasionally   • Drug use: No   • Sexual activity: None   Other Topics Concern   • None   Social History Narrative    Former smoker - As per [employer]    Never used drugs - As per [employer]      Social Determinants of Health     Financial Resource Strain: Low Risk  (12/13/2022)    Overall Financial Resource Strain (CARDIA)    • Difficulty of Paying Living Expenses: Not very hard   Food Insecurity: Not on file   Transportation Needs: No Transportation Needs (12/13/2022)    PRAPARE - Transportation    • Lack of Transportation (Medical): No    • Lack of Transportation (Non-Medical): No   Physical Activity: Not on file   Stress: Not on file   Social Connections: Not on file   Intimate Partner Violence: Not on file   Housing Stability: Not on file        FAMILY HISTORY:  Family History   Problem Relation Age of Onset   • Skin cancer Mother    • No Known Problems Father            Objective    PHYSICAL EXAMINATION:   Blood pressure 118/76, pulse 80, resp. rate 20, height 6' (1.829 m), weight 97.5 kg (215 lb), SpO2 98 %. ECOG Performance Status    Flowsheet Row Most Recent Value   ECOG Performance Status 0 - Fully active, able to carry on all pre-disease performance without restriction             Physical Exam  Constitutional:       General: He is not in acute distress. Appearance: Normal appearance. He is not toxic-appearing. HENT:      Head: Normocephalic and atraumatic. Right Ear: External ear normal.      Left Ear: External ear normal.      Nose: Nose normal.      Mouth/Throat:      Mouth: Mucous membranes are moist.      Pharynx: Oropharynx is clear. Eyes:      General: No scleral icterus. Right eye: No discharge. Left eye: No discharge. Conjunctiva/sclera: Conjunctivae normal.   Cardiovascular:      Rate and Rhythm: Normal rate and regular rhythm. Pulses: Normal pulses. Heart sounds: No murmur heard. No friction rub. No gallop. Pulmonary:      Effort: Pulmonary effort is normal. No respiratory distress. Breath sounds: Normal breath sounds. No wheezing or rales. Abdominal:      General: Bowel sounds are normal. There is no distension. Palpations: There is no mass. Tenderness: There is no abdominal tenderness. There is no rebound.    Musculoskeletal: General: No swelling or tenderness. Normal range of motion. Cervical back: Normal range of motion. No rigidity. Right lower leg: No edema. Left lower leg: No edema. Lymphadenopathy:      Head:      Right side of head: No submandibular, preauricular or posterior auricular adenopathy. Left side of head: No submandibular, preauricular or posterior auricular adenopathy. Cervical: No cervical adenopathy. Right cervical: No superficial or posterior cervical adenopathy. Left cervical: No superficial or posterior cervical adenopathy. Upper Body:      Right upper body: No supraclavicular or axillary adenopathy. Left upper body: No supraclavicular or axillary adenopathy. Lower Body: No right inguinal adenopathy. No left inguinal adenopathy. Skin:     Coloration: Skin is not jaundiced. Findings: No lesion or rash. Comments: Well healed surgical scar. No evidence of recurrence at primary site. Neurological:      General: No focal deficit present. Mental Status: He is alert and oriented to person, place, and time. Cranial Nerves: No cranial nerve deficit. Motor: No weakness. Gait: Gait normal.   Psychiatric:         Mood and Affect: Mood normal.         Behavior: Behavior normal.         Thought Content: Thought content normal.         Judgment: Judgment normal.         I reviewed lab data in the chart.     WBC   Date Value Ref Range Status   06/15/2023 5.21 4.31 - 10.16 Thousand/uL Final   09/06/2022 6.54 4.31 - 10.16 Thousand/uL Final   01/27/2022 5.32 4.31 - 10.16 Thousand/uL Final     Hemoglobin   Date Value Ref Range Status   06/15/2023 15.0 12.0 - 17.0 g/dL Final   09/06/2022 15.4 12.0 - 17.0 g/dL Final   01/27/2022 15.0 12.0 - 17.0 g/dL Final     Platelets   Date Value Ref Range Status   06/15/2023 188 149 - 390 Thousands/uL Final   09/06/2022 178 149 - 390 Thousands/uL Final   01/27/2022 196 149 - 390 Thousands/uL Final     MCV   Date Value Ref Range Status   06/15/2023 95 82 - 98 fL Final   09/06/2022 94 82 - 98 fL Final   01/27/2022 91 82 - 98 fL Final      Potassium   Date Value Ref Range Status   06/15/2023 4.1 3.5 - 5.3 mmol/L Final   09/06/2022 3.7 3.5 - 5.3 mmol/L Final   01/27/2022 4.2 3.5 - 5.3 mmol/L Final     Chloride   Date Value Ref Range Status   06/15/2023 110 (H) 96 - 108 mmol/L Final   09/06/2022 104 96 - 108 mmol/L Final   01/27/2022 106 100 - 108 mmol/L Final     CO2   Date Value Ref Range Status   06/15/2023 23 21 - 32 mmol/L Final   09/06/2022 26 21 - 32 mmol/L Final   01/27/2022 27 21 - 32 mmol/L Final     BUN   Date Value Ref Range Status   06/15/2023 19 5 - 25 mg/dL Final   09/06/2022 19 5 - 25 mg/dL Final   01/27/2022 22 5 - 25 mg/dL Final     Creatinine   Date Value Ref Range Status   06/15/2023 1.21 0.60 - 1.30 mg/dL Final     Comment:     Standardized to IDMS reference method   09/06/2022 1.13 0.60 - 1.30 mg/dL Final     Comment:     Standardized to IDMS reference method   01/27/2022 1.28 0.60 - 1.30 mg/dL Final     Comment:     Standardized to IDMS reference method     Glucose   Date Value Ref Range Status   11/04/2021 94 65 - 140 mg/dL Final     Comment:     If the patient is fasting, the ADA then defines impaired fasting glucose as > 100 mg/dL and diabetes as > or equal to 123 mg/dL. Specimen collection should occur prior to Sulfasalazine administration due to the potential for falsely depressed results. Specimen collection should occur prior to Sulfapyridine administration due to the potential for falsely elevated results.      Calcium   Date Value Ref Range Status   06/15/2023 9.3 8.3 - 10.1 mg/dL Final   09/06/2022 9.0 8.3 - 10.1 mg/dL Final   01/27/2022 9.9 8.3 - 10.1 mg/dL Final     Albumin   Date Value Ref Range Status   06/15/2023 3.8 3.5 - 5.0 g/dL Final   09/06/2022 4.0 3.5 - 5.0 g/dL Final   01/27/2022 3.8 3.5 - 5.0 g/dL Final     Total Bilirubin   Date Value Ref Range Status   06/15/2023 0.94 0.20 - 1.00 mg/dL Final     Comment:     Use of this assay is not recommended for patients undergoing treatment with eltrombopag due to the potential for falsely elevated results. 09/06/2022 1.08 (H) 0.20 - 1.00 mg/dL Final     Comment:     Use of this assay is not recommended for patients undergoing treatment with eltrombopag due to the potential for falsely elevated results. 01/27/2022 0.73 0.20 - 1.00 mg/dL Final     Comment:     Use of this assay is not recommended for patients undergoing treatment with eltrombopag due to the potential for falsely elevated results. Alkaline Phosphatase   Date Value Ref Range Status   06/15/2023 57 46 - 116 U/L Final   09/06/2022 56 46 - 116 U/L Final   01/27/2022 65 46 - 116 U/L Final     AST   Date Value Ref Range Status   06/15/2023 16 5 - 45 U/L Final     Comment:     Specimen collection should occur prior to Sulfasalazine administration due to the potential for falsely depressed results. 09/06/2022 18 5 - 45 U/L Final     Comment:     Specimen collection should occur prior to Sulfasalazine administration due to the potential for falsely depressed results. 01/27/2022 17 5 - 45 U/L Final     Comment:     Specimen collection should occur prior to Sulfasalazine administration due to the potential for falsely depressed results. ALT   Date Value Ref Range Status   06/15/2023 29 12 - 78 U/L Final     Comment:     Specimen collection should occur prior to Sulfasalazine and/or Sulfapyridine administration due to the potential for falsely depressed results. 09/06/2022 29 12 - 78 U/L Final     Comment:     Specimen collection should occur prior to Sulfasalazine administration due to the potential for falsely depressed results. 01/27/2022 31 12 - 78 U/L Final     Comment:     Specimen collection should occur prior to Sulfasalazine and/or Sulfapyridine administration due to the potential for falsely depressed results.        LD   Date Value Ref Range Status   06/15/2023 197 81 - 234 U/L Final   09/06/2022 157 81 - 234 U/L Final   01/27/2022 186 81 - 234 U/L Final     No results found for: "TSH"  No results found for: "Y9GEWDQ"   No results found for: "FREET4"      RECENT IMAGING:  No results found. Assessment/Plan  Mr. Zhane Simon is a 76 yr male with stage Ib melanoma here for continued monitoring, follow-up, and surveillance. 1. Malignant melanoma of upper back ([address])  2. Malignant melanoma of torso excluding breast (HCC)  3. Personal history of malignant melanoma  4. Encounter for follow-up surveillance of melanoma  He is doing well and has no clinical evidence of melanoma recurrence or metastasis. Labs reviewed and okay. He knows to continue to monitor for any changes or concerning skin lesions. He knows to call with any issues or concerns prior to his next visit. He will follow-up in 6 months. At that time we will blood work and orders were placed and prescription provided. He continues to follow-up with surgical oncology as well. We discussed continued dermatology follow-up as well as safe sun practices now that we are heading into the summertime.      - CBC and differential; Future  - Comprehensive metabolic panel; Future  - LD,Blood; Future          Return in about 6 months (around 12/19/2023) for Office Visit, labs.      Ross Laughlin MD, PhD

## 2023-07-25 ENCOUNTER — OFFICE VISIT (OUTPATIENT)
Dept: SURGICAL ONCOLOGY | Facility: CLINIC | Age: 68
End: 2023-07-25
Payer: MEDICARE

## 2023-07-25 VITALS
TEMPERATURE: 96.7 F | WEIGHT: 210 LBS | OXYGEN SATURATION: 99 % | HEIGHT: 72 IN | BODY MASS INDEX: 28.44 KG/M2 | HEART RATE: 72 BPM | SYSTOLIC BLOOD PRESSURE: 118 MMHG | RESPIRATION RATE: 15 BRPM | DIASTOLIC BLOOD PRESSURE: 80 MMHG

## 2023-07-25 DIAGNOSIS — Z85.820 ENCOUNTER FOR FOLLOW-UP SURVEILLANCE OF MELANOMA: Primary | ICD-10-CM

## 2023-07-25 DIAGNOSIS — Z08 ENCOUNTER FOR FOLLOW-UP SURVEILLANCE OF MELANOMA: Primary | ICD-10-CM

## 2023-07-25 DIAGNOSIS — Z85.820 PERSONAL HISTORY OF MALIGNANT MELANOMA: ICD-10-CM

## 2023-07-25 PROCEDURE — 99214 OFFICE O/P EST MOD 30 MIN: CPT | Performed by: STUDENT IN AN ORGANIZED HEALTH CARE EDUCATION/TRAINING PROGRAM

## 2023-07-25 NOTE — PROGRESS NOTES
Surgical Oncology Follow Up    [address] [facility] [address] [place] [id number] [phone]    Miles Amado  1955  [id number]    Diagnoses and all orders for this visit:    Encounter for follow-up surveillance of melanoma    Personal history of malignant melanoma        Chief Complaint   Patient presents with   • Follow-up       No follow-ups on file. Oncology History   Personal history of malignant melanoma   12/20/2021 Biopsy    Left upper back Melamona: shave biopsy  Thickness: 0.8mm  Ulceration: none   Mitoses: 0  Margins: Approaches both lateral margins and extends to the deep margin      12/20/2021 -  Cancer Staged    Staging form: Melanoma of the Skin, AJCC 8th Edition  - Clinical stage from 12/20/2021: Stage IB (cT1b, cN0, cM0) - Signed by Tod Booth MD on 1/27/2022 1/17/2022 Initial Diagnosis    Malignant melanoma of upper back ([address])     2/15/2022 Surgery    A. Lymph node, sentinel, left neck #1:  Two lymph nodes, negative for metastatic melanoma (0/2). B. Lymph node, sentinel, left neck #2:  Two lymph nodes, negative for metastatic melanoma (0/2). C. Skin and soft tissue, upper left back, wide excision: Scar. Staging: T1bNx    Treatment history: s/p WLE and insufficient SLN bx 2/15/22  Current treatment: obs  Disease status:     History of Present Illness:  Presents in f/u after wide local excision and sentinel lymph node biopsy for a final path T1b melanoma of the back. The sentinel lymph node mapping in the operating room was insufficient, with no dominant site of elevated Maxwell counts or blue dye. US @ 6 mo and now @ 12 mo with no abnormal nodes. Sees Maria L regularly; had a BCC removed in March. No new lesions to report.      Review of Systems  Complete ROS Surg Onc:   Constitutional: The patient denies new or recent history of general fatigue, no recent weight loss, no change in appetite. Eyes: No complaints of visual problems, no scleral icterus. ENT: no complaints of ear pain, no hoarseness, no difficulty swallowing,  no tinnitus and no new masses in head, oral cavity, or neck. Cardiovascular: No complaints of chest pain, no palpitations, no ankle edema. Respiratory: No complaints of shortness of breath, no cough. Gastrointestinal: No complaints of jaundice, no bloody stools, no pale stools. Genitourinary: No complaints of dysuria, no hematuria, no nocturia, no frequent urination, no urethral discharge. Musculoskeletal: No complaints of weakness, paralysis, joint stiffness or arthralgias. Integumentary: No complaints of rash, no new lesions. Neurological: No complaints of convulsions, no seizures, no dizziness. Hematologic/Lymphatic: No complaints of easy bruising. Endocrine:  No hot or cold intolerance. No polydipsia, polyphagia, or polyuria. Allergy/immunology:  No environmental allergies. No food allergies. Not immunocompromised. Skin:  No pallor or rash. No wound.         Patient Active Problem List   Diagnosis   • HTN (hypertension)   • Hypercholesterolemia   • Numerous moles   • GERD (gastroesophageal reflux disease)   • BMI 28.0-28.9,adult   • Personal history of malignant melanoma   • Encounter for follow-up surveillance of melanoma     Past Medical History:   Diagnosis Date   • Allergic rhinitis     Resolved 1/14/2016    • Allergy     Mild; spring and fall   • Cancer [facility])     melanoma   • Colon polyp    • Diverticulitis    • Erectile dysfunction of nonorganic origin     Resolved 1/14/2016    • GERD (gastroesophageal reflux disease)    • Hyperlipidemia    • Hypertension    • Rosacea     Resolved 1/14/2016      Past Surgical History:   Procedure Laterality Date   • COLONOSCOPY     • FACIAL/NECK BIOPSY Right 01/09/2018    Procedure: CHEEK and LOWER EYELID BCC EXCISION AND COMPLEX CLOSURE;  Surgeon: Natalee Thurston MD;  [facility];  Service: Plastics   • FL INJECTION LEFT HIP (NON ARTHROGRAM)  02/07/2022   • LYMPH NODE BIOPSY Left 02/15/2022    Procedure: BIOPSY LYMPH NODE SENTINEL (NUC MED INJECTION AT 1300); Surgeon: Eduar Chino MD;  [facility];  Service: Surgical Oncology   • SKIN BIOPSY     • SKIN LESION EXCISION Left 02/15/2022    Procedure: UPPER BACK LESION WIDE EXCISION;  Surgeon: Eduar Chino MD;  [facility];  Service: Surgical Oncology   • WISDOM TOOTH EXTRACTION       Family History   Problem Relation Age of Onset   • Skin cancer Mother    • No Known Problems Father      Social History     Socioeconomic History   • Marital status: /Civil Union     Spouse name: Not on file   • Number of children: Not on file   • Years of education: Not on file   • Highest education level: Not on file   Occupational History   • Not on file   Tobacco Use   • Smoking status: Light Smoker     Types: Cigars   • Smokeless tobacco: Never   • Tobacco comments:     cigar 6 times a year   Vaping Use   • Vaping Use: Never used   Substance and Sexual Activity   • Alcohol use: Yes     Alcohol/week: 1.0 standard drink of alcohol     Types: 1 Shots of liquor per week     Comment: occasionally   • Drug use: No   • Sexual activity: Not on file   Other Topics Concern   • Not on file   Social History Narrative    Former smoker - As per [employer]    Never used drugs - As per [employer]      Social Determinants of Health     Financial Resource Strain: Low Risk  (12/13/2022)    Overall Financial Resource Strain (CARDIA)    • Difficulty of Paying Living Expenses: Not very hard   Food Insecurity: Not on file   Transportation Needs: No Transportation Needs (12/13/2022)    PRAPARE - Transportation    • Lack of Transportation (Medical): No    • Lack of Transportation (Non-Medical):  No   Physical Activity: Not on file   Stress: Not on file   Social Connections: Not on file   Intimate Partner Violence: Not on file   Housing Stability: Not on file Current Outpatient Medications:   •  atorvastatin (LIPITOR) 20 mg tablet, Take 1 tablet (20 mg total) by mouth daily, Disp: 90 tablet, Rfl: 1  •  losartan (COZAAR) 100 MG tablet, Take 1 tablet (100 mg total) by mouth daily, Disp: 90 tablet, Rfl: 1  •  omeprazole (PriLOSEC) 40 MG capsule, Take 40 mg by mouth daily Every other day, Disp: , Rfl:   Allergies   Allergen Reactions   • Neomycin-Polymyxin-Dexameth      Annotation - [id number]: skin peeled around eyes   • Pollen Extract      Vitals:    07/25/23 1107   BP: 118/80   Pulse: 72   Resp: 15   Temp: (!) 96.7 °F (35.9 °C)   SpO2: 99%       Physical Exam  Constitutional: Patient is well-developed and well-nourished who appears the stated age in no acute distress. Patient is pleasant and talkative. HEENT:  Normocephalic. Sclerae are anicteric. Mucous membranes are moist. Neck is supple without adenopathy. Incision well-healed, No JVD. Chest: Equal chest rise, easy WOB  Cardiac: Heart is regular rate. Abdomen: Abdomen is non-tender, non-distended and without masses. Extremities: There is no clubbing or cyanosis. There is no edema. Symmetric. Neuro: Grossly nonfocal. Gait is normal.     Lymphatic: No evidence of cervical adenopathy bilaterally. No evidence of axillary adenopathy bilaterally. No evidence of inguinal adenopathy bilaterally. Skin: Warm, anicteric. Incision flat with no abnormality  Psych:  Patient is pleasant and talkative. Pathology:  As above    Labs:  Reviewed in [email or web address] personally    Imaging  NM lymphatic melanoma    Addendum Date: 2/15/2022 Addendum:   ADDENDUM: Please note the dictation error documenting the site at left upper back. 0.48 mCi Tc-99m sulfur colloid (0.6 cc volume) was administered intradermally in divided doses in the region of the patients left upper back melanoma. Result Date: 2/15/2022  Narrative: SENTINEL NODE LYMPHOSCINTIGRAPHY INDICATION:   Left upper back melanoma.  FINDINGS: 0.48 mCi Tc-99m sulfur colloid (0.6 cc volume) was administered intradermally in divided doses in the region of the patients left upper pelvic melanoma. Scintigraphic images were obtained over the chest and neck. Initially 2 foci of intense tracer uptake identified in the left neck base followed by a  slightly inferior tracer uptake. Using scintigraphic guidance, the corresponding skin sites were marked with an indelible marker. The patient was transferred to the operating room in satisfactory condition. Impression: 1. [place] lymph node localized to left neck base to at least 3 foci of tracer uptake. . Workstation performed: [id number]     FL injection left hip (non arthrogram)    Result Date: 2/7/2022  Narrative: LEFT HIP INJECTION INDICATION:  Patient presents with prescription for left hip steroid injection. COMPARISON:  Plain films hips and pelvis left 1/8/2022. IMAGES:  3 FLUOROSCOPY TIME:  0.1 MFT. FINDINGS: After the risks and benefits of the procedure were thoroughly explained, informed consent was obtained. The patient was prepped and draped in the usual sterile fashion. 1% lidocaine solution was utilized for local anesthesia. Intermittent fluoroscopy was utilized for placement a 20 gauge  3.5 inch spinal needle within the left hip joint. After positioning was confirmed with 3 mL of Omnipaque 300, a solution comprised of 1 mL 80 mg/mL Depomedrol, 2 mL of 0.25% Sensorcaine and 2 mL 1% Xylocaine. was injected into the left hip joint. The patient tolerated the procedure well. There were no complications. Impression: Post injection patient does admit to mid improvement of his typical left hip pain. Technically successful left hip steroid injection. Procedure was performed by ELZA Capps PA-C under the direct supervision of Dr. Butch Baker. Workstation performed: [id number]      6/2023  IMPRESSION:     Normal-appearing left lateral neck lymph nodes. No abnormal lymph nodes seen.   I reviewed and intrepreted the above laboratory and imaging data incl present and past iamging, derm notes    Discussion/Summary:   T1 B melanoma of the back excised 2/2022, with an insufficient sentinel lymph node procedure. Recent neck US with no abnormalities. Doing well.  Will RTC in 6 mo with repeat US and for H&P

## 2023-07-27 ENCOUNTER — OFFICE VISIT (OUTPATIENT)
Dept: INTERNAL MEDICINE CLINIC | Age: 68
End: 2023-07-27
Payer: MEDICARE

## 2023-07-27 VITALS
OXYGEN SATURATION: 97 % | BODY MASS INDEX: 28.17 KG/M2 | HEIGHT: 72 IN | TEMPERATURE: 97.6 F | SYSTOLIC BLOOD PRESSURE: 116 MMHG | DIASTOLIC BLOOD PRESSURE: 78 MMHG | WEIGHT: 208 LBS | HEART RATE: 92 BPM

## 2023-07-27 DIAGNOSIS — N52.9 ERECTILE DYSFUNCTION, UNSPECIFIED ERECTILE DYSFUNCTION TYPE: ICD-10-CM

## 2023-07-27 DIAGNOSIS — K21.9 GASTROESOPHAGEAL REFLUX DISEASE WITHOUT ESOPHAGITIS: ICD-10-CM

## 2023-07-27 DIAGNOSIS — I10 PRIMARY HYPERTENSION: Primary | ICD-10-CM

## 2023-07-27 DIAGNOSIS — Z85.820 PERSONAL HISTORY OF MALIGNANT MELANOMA: ICD-10-CM

## 2023-07-27 DIAGNOSIS — M25.552 LEFT HIP PAIN: ICD-10-CM

## 2023-07-27 DIAGNOSIS — R73.01 IMPAIRED FASTING BLOOD SUGAR: ICD-10-CM

## 2023-07-27 DIAGNOSIS — E78.00 HYPERCHOLESTEROLEMIA: ICD-10-CM

## 2023-07-27 DIAGNOSIS — Z82.49 FAMILY HISTORY OF CORONARY ARTERY DISEASE: ICD-10-CM

## 2023-07-27 PROCEDURE — 99214 OFFICE O/P EST MOD 30 MIN: CPT | Performed by: INTERNAL MEDICINE

## 2023-07-27 RX ORDER — SILDENAFIL 100 MG/1
50 TABLET, FILM COATED ORAL DAILY PRN
Qty: 30 TABLET | Refills: 1 | Status: SHIPPED | OUTPATIENT
Start: 2023-07-27

## 2023-07-27 NOTE — PROGRESS NOTES
Assessment/Plan:     Diagnoses and all orders for this visit:    Primary hypertension    Hypercholesterolemia  -     Lipid panel; Future    Gastroesophageal reflux disease without esophagitis    Personal history of malignant melanoma    Family history of coronary artery disease  -     CT coronary calcium score; Future             M*Pixafy software was used to dictate this note. It may contain errors with dictating incorrect words or incorrect spelling. Please contact the provider directly with any questions. Subjective:   Chief Complaint   Patient presents with   • Follow-up     Pt here for 6 month f/u    • HM     Pt due for:   BMI f/u plan   PHQ        Patient ID: Amilcar Andres is a 76 y.o. male. HPI  This is a very pleasant 76 years young gentleman who is here today for the regular follow-up he is doing very well no new complaints system is essentially unremarkable he is taking his blood pressure and cholesterol medication and that is all he takes except for the occasional use of Viagra. He was having some problems with his left hip and a couple of years ago he got a shot from his orthopedic doctor he came complaining of the same kind of hip pain and he would like to go back to the orthopedic surgery for possible injection  Increase fasting blood sugar I will repeat the blood sugar and hemoglobin A1c for him    The following portions of the patient's history were reviewed and updated as appropriate: allergies, current medications, past family history, past medical history, past social history, past surgical history and problem list.    Review of Systems   Constitutional: Negative for appetite change, fatigue and fever. HENT: Negative for congestion, ear pain, hearing loss, nosebleeds, sneezing, tinnitus and voice change. Eyes: Negative for pain, discharge and redness. Respiratory: Negative for cough, chest tightness and wheezing.     Cardiovascular: Negative for chest pain, palpitations and leg swelling. Gastrointestinal: Negative for abdominal pain, blood in stool, constipation, diarrhea, nausea and vomiting. Genitourinary: Negative for difficulty urinating, dysuria, hematuria and urgency. Musculoskeletal: Positive for arthralgias (L hip pain). Negative for back pain, gait problem and joint swelling. Skin: Negative for rash and wound. Allergic/Immunologic: Negative for environmental allergies. Neurological: Negative for dizziness, tremors, seizures, weakness, light-headedness and numbness. Hematological: Negative for adenopathy. Does not bruise/bleed easily. Psychiatric/Behavioral: Negative for behavioral problems and confusion. The patient is not nervous/anxious.           Past Medical History:   Diagnosis Date   • Allergic    • Allergic rhinitis     Resolved 1/14/2016    • Allergy     Mild; spring and fall   • Cancer [facility])     melanoma   • Colon polyp    • Diverticulitis    • Erectile dysfunction of nonorganic origin     Resolved 1/14/2016    • GERD (gastroesophageal reflux disease)    • Hyperlipidemia    • Hypertension    • Inflammatory bowel disease 11/42021    Diverticulitis   • Rosacea     Resolved 1/14/2016          Current Outpatient Medications:   •  atorvastatin (LIPITOR) 20 mg tablet, Take 1 tablet (20 mg total) by mouth daily, Disp: 90 tablet, Rfl: 1  •  losartan (COZAAR) 100 MG tablet, Take 1 tablet (100 mg total) by mouth daily, Disp: 90 tablet, Rfl: 1  •  omeprazole (PriLOSEC) 40 MG capsule, Take 40 mg by mouth daily Every other day, Disp: , Rfl:     Allergies   Allergen Reactions   • Neomycin-Polymyxin-Dexameth      Annotation - [id number]: skin peeled around eyes   • Pollen Extract        Social History   Past Surgical History:   Procedure Laterality Date   • COLONOSCOPY     • FACIAL/NECK BIOPSY Right 01/09/2018    Procedure: CHEEK and LOWER EYELID BCC EXCISION AND COMPLEX CLOSURE;  Surgeon: Isa Michael MD;  [facility];  Service: Plastics   • FL INJECTION LEFT HIP (NON ARTHROGRAM)  02/07/2022   • LYMPH NODE BIOPSY Left 02/15/2022    Procedure: BIOPSY LYMPH NODE SENTINEL (NUC MED INJECTION AT 1300); Surgeon: Servando Flores MD;  [facility];  Service: Surgical Oncology   • SKIN BIOPSY     • SKIN LESION EXCISION Left 02/15/2022    Procedure: UPPER BACK LESION WIDE EXCISION;  Surgeon: Servando Flores MD;  [facility];  Service: Surgical Oncology   • WISDOM TOOTH EXTRACTION       Family History   Problem Relation Age of Onset   • Skin cancer Mother    • No Known Problems Father        Objective:  /78 (BP Location: Left arm, Patient Position: Sitting, Cuff Size: Standard)   Pulse 92   Temp 97.6 °F (36.4 °C) (Temporal)   Ht 6' (1.829 m)   Wt 94.3 kg (208 lb)   SpO2 97%   BMI 28.21 kg/m²        Physical Exam  Constitutional:       Appearance: Normal appearance. He is well-developed. HENT:      Right Ear: External ear normal.      Left Ear: External ear normal.   Eyes:      Conjunctiva/sclera: Conjunctivae normal.      Pupils: Pupils are equal, round, and reactive to light. Neck:      Thyroid: No thyromegaly. Vascular: No JVD. Cardiovascular:      Rate and Rhythm: Normal rate and regular rhythm. Heart sounds: Normal heart sounds. Pulmonary:      Breath sounds: Normal breath sounds. Abdominal:      General: Bowel sounds are normal.      Palpations: Abdomen is soft. Musculoskeletal:         General: Normal range of motion. Cervical back: Normal range of motion. Lymphadenopathy:      Cervical: No cervical adenopathy. Skin:     General: Skin is dry. Neurological:      General: No focal deficit present. Mental Status: He is alert and oriented to person, place, and time. Mental status is at baseline. Deep Tendon Reflexes: Reflexes are normal and symmetric.    Psychiatric:         Mood and Affect: Mood normal.         Behavior: Behavior normal.

## 2023-08-18 DIAGNOSIS — E78.2 MIXED HYPERLIPIDEMIA: ICD-10-CM

## 2023-08-18 RX ORDER — ATORVASTATIN CALCIUM 20 MG/1
20 TABLET, FILM COATED ORAL DAILY
Qty: 90 TABLET | Refills: 1 | Status: SHIPPED | OUTPATIENT
Start: 2023-08-18

## 2023-08-18 RX ORDER — ATORVASTATIN CALCIUM 20 MG/1
20 TABLET, FILM COATED ORAL DAILY
Qty: 90 TABLET | Refills: 1 | OUTPATIENT
Start: 2023-08-18

## 2023-08-27 DIAGNOSIS — I10 ESSENTIAL HYPERTENSION: ICD-10-CM

## 2023-08-28 RX ORDER — LOSARTAN POTASSIUM 100 MG/1
100 TABLET ORAL DAILY
Qty: 90 TABLET | Refills: 1 | OUTPATIENT
Start: 2023-08-28

## 2023-08-30 DIAGNOSIS — I10 ESSENTIAL HYPERTENSION: ICD-10-CM

## 2023-08-30 RX ORDER — LOSARTAN POTASSIUM 100 MG/1
100 TABLET ORAL DAILY
Qty: 90 TABLET | Refills: 1 | OUTPATIENT
Start: 2023-08-30

## 2023-09-02 DIAGNOSIS — I10 ESSENTIAL HYPERTENSION: ICD-10-CM

## 2023-09-05 RX ORDER — LOSARTAN POTASSIUM 100 MG/1
100 TABLET ORAL DAILY
Qty: 90 TABLET | Refills: 1 | Status: SHIPPED | OUTPATIENT
Start: 2023-09-05

## 2023-12-18 ENCOUNTER — TELEPHONE (OUTPATIENT)
Dept: HEMATOLOGY ONCOLOGY | Facility: CLINIC | Age: 68
End: 2023-12-18

## 2023-12-18 NOTE — TELEPHONE ENCOUNTER
Appointment Change  Cancel, Reschedule, Change to Virtual      Who are you speaking with? Patient   If it is not the patient, is the caller listed on the communication consent form? N/A   Which provider is the appointment scheduled with? Dr. Knight   When was the original appointment scheduled?    Please list date and time 12/20/23 840   At which location is the appointment scheduled to take place? [facility]   Was the appointment rescheduled?     Was the appointment changed from an in person visit to a virtual visit?    If so, please list the details of the change. 1/15/23 220   What is the reason for the appointment change? labs       Was STAR transport scheduled? N/A   Does STAR transport need to be scheduled for the new visit (if applicable) N/A   Does the patient need an infusion appointment rescheduled? N/A   Does the patient have an upcoming infusion appointment scheduled? If so, when? No   Is the patient undergoing chemotherapy? N/A   For appointments cancelled with less than 24 hours:  Was the no-show policy reviewed? N/A

## 2024-01-10 ENCOUNTER — APPOINTMENT (OUTPATIENT)
Dept: LAB | Age: 69
End: 2024-01-10
Payer: MEDICARE

## 2024-01-10 DIAGNOSIS — C43.59 MALIGNANT MELANOMA OF TORSO EXCLUDING BREAST (HCC): ICD-10-CM

## 2024-01-10 DIAGNOSIS — C43.59 MALIGNANT MELANOMA OF UPPER BACK (HCC): ICD-10-CM

## 2024-01-10 DIAGNOSIS — R73.01 IMPAIRED FASTING BLOOD SUGAR: ICD-10-CM

## 2024-01-10 DIAGNOSIS — E78.00 HYPERCHOLESTEROLEMIA: ICD-10-CM

## 2024-01-10 LAB
ALBUMIN SERPL BCP-MCNC: 4.2 G/DL (ref 3.5–5)
ALP SERPL-CCNC: 50 U/L (ref 34–104)
ALT SERPL W P-5'-P-CCNC: 20 U/L (ref 7–52)
ANION GAP SERPL CALCULATED.3IONS-SCNC: 6 MMOL/L
AST SERPL W P-5'-P-CCNC: 18 U/L (ref 13–39)
BASOPHILS # BLD AUTO: 0.04 THOUSANDS/ÂΜL (ref 0–0.1)
BASOPHILS NFR BLD AUTO: 1 % (ref 0–1)
BILIRUB SERPL-MCNC: 0.96 MG/DL (ref 0.2–1)
BUN SERPL-MCNC: 18 MG/DL (ref 5–25)
CALCIUM SERPL-MCNC: 9.5 MG/DL (ref 8.4–10.2)
CHLORIDE SERPL-SCNC: 106 MMOL/L (ref 96–108)
CHOLEST SERPL-MCNC: 163 MG/DL
CO2 SERPL-SCNC: 27 MMOL/L (ref 21–32)
CREAT SERPL-MCNC: 1.21 MG/DL (ref 0.6–1.3)
EOSINOPHIL # BLD AUTO: 0.06 THOUSAND/ÂΜL (ref 0–0.61)
EOSINOPHIL NFR BLD AUTO: 1 % (ref 0–6)
ERYTHROCYTE [DISTWIDTH] IN BLOOD BY AUTOMATED COUNT: 12.7 % (ref 11.6–15.1)
EST. AVERAGE GLUCOSE BLD GHB EST-MCNC: 105 MG/DL
GFR SERPL CREATININE-BSD FRML MDRD: 61 ML/MIN/1.73SQ M
GLUCOSE P FAST SERPL-MCNC: 85 MG/DL (ref 65–99)
HBA1C MFR BLD: 5.3 %
HCT VFR BLD AUTO: 47.4 % (ref 36.5–49.3)
HDLC SERPL-MCNC: 53 MG/DL
HGB BLD-MCNC: 15.7 G/DL (ref 12–17)
IMM GRANULOCYTES # BLD AUTO: 0.02 THOUSAND/UL (ref 0–0.2)
IMM GRANULOCYTES NFR BLD AUTO: 0 % (ref 0–2)
LDH SERPL-CCNC: 168 U/L (ref 140–271)
LDLC SERPL CALC-MCNC: 87 MG/DL (ref 0–100)
LYMPHOCYTES # BLD AUTO: 1.35 THOUSANDS/ÂΜL (ref 0.6–4.47)
LYMPHOCYTES NFR BLD AUTO: 26 % (ref 14–44)
MCH RBC QN AUTO: 31.8 PG (ref 26.8–34.3)
MCHC RBC AUTO-ENTMCNC: 33.1 G/DL (ref 31.4–37.4)
MCV RBC AUTO: 96 FL (ref 82–98)
MONOCYTES # BLD AUTO: 0.49 THOUSAND/ÂΜL (ref 0.17–1.22)
MONOCYTES NFR BLD AUTO: 9 % (ref 4–12)
NEUTROPHILS # BLD AUTO: 3.28 THOUSANDS/ÂΜL (ref 1.85–7.62)
NEUTS SEG NFR BLD AUTO: 63 % (ref 43–75)
NONHDLC SERPL-MCNC: 110 MG/DL
NRBC BLD AUTO-RTO: 0 /100 WBCS
PLATELET # BLD AUTO: 211 THOUSANDS/UL (ref 149–390)
PMV BLD AUTO: 10.8 FL (ref 8.9–12.7)
POTASSIUM SERPL-SCNC: 4 MMOL/L (ref 3.5–5.3)
PROT SERPL-MCNC: 6.9 G/DL (ref 6.4–8.4)
RBC # BLD AUTO: 4.93 MILLION/UL (ref 3.88–5.62)
SODIUM SERPL-SCNC: 139 MMOL/L (ref 135–147)
TRIGL SERPL-MCNC: 116 MG/DL
WBC # BLD AUTO: 5.24 THOUSAND/UL (ref 4.31–10.16)

## 2024-01-10 PROCEDURE — 83036 HEMOGLOBIN GLYCOSYLATED A1C: CPT

## 2024-01-10 PROCEDURE — 80061 LIPID PANEL: CPT

## 2024-01-10 PROCEDURE — 36415 COLL VENOUS BLD VENIPUNCTURE: CPT

## 2024-01-10 PROCEDURE — 83615 LACTATE (LD) (LDH) ENZYME: CPT

## 2024-01-10 PROCEDURE — 80053 COMPREHEN METABOLIC PANEL: CPT

## 2024-01-10 PROCEDURE — 85025 COMPLETE CBC W/AUTO DIFF WBC: CPT

## 2024-01-15 ENCOUNTER — TELEPHONE (OUTPATIENT)
Dept: HEMATOLOGY ONCOLOGY | Facility: CLINIC | Age: 69
End: 2024-01-15

## 2024-01-15 NOTE — TELEPHONE ENCOUNTER
Appointment Change  Cancel, Reschedule, Change to Virtual      Who are you speaking with? Patient   If it is not the patient, is the caller listed on the communication consent form? N/A   Which provider is the appointment scheduled with? Dr. Knight   When was the original appointment scheduled?    Please list date and time 1/15/24 220   At which location is the appointment scheduled to take place? [facility]   Was the appointment rescheduled?     Was the appointment changed from an in person visit to a virtual visit?    If so, please list the details of the change. 1/25/24 840   What is the reason for the appointment change? Pt sick       Was STAR transport scheduled? N/A   Does STAR transport need to be scheduled for the new visit (if applicable) N/A   Does the patient need an infusion appointment rescheduled? N/A   Does the patient have an upcoming infusion appointment scheduled? If so, when? No   Is the patient undergoing chemotherapy? N/A   For appointments cancelled with less than 24 hours:  Was the no-show policy reviewed? Yes

## 2024-01-22 ENCOUNTER — RA CDI HCC (OUTPATIENT)
Dept: OTHER | Facility: HOSPITAL | Age: 69
End: 2024-01-22

## 2024-01-25 ENCOUNTER — OFFICE VISIT (OUTPATIENT)
Dept: HEMATOLOGY ONCOLOGY | Facility: CLINIC | Age: 69
End: 2024-01-25
Payer: MEDICARE

## 2024-01-25 ENCOUNTER — HOSPITAL ENCOUNTER (OUTPATIENT)
Dept: ULTRASOUND IMAGING | Facility: HOSPITAL | Age: 69
Discharge: HOME | End: 2024-01-25
Attending: STUDENT IN AN ORGANIZED HEALTH CARE EDUCATION/TRAINING PROGRAM
Payer: MEDICARE

## 2024-01-25 VITALS
WEIGHT: 210 LBS | HEART RATE: 82 BPM | SYSTOLIC BLOOD PRESSURE: 122 MMHG | BODY MASS INDEX: 28.44 KG/M2 | RESPIRATION RATE: 18 BRPM | TEMPERATURE: 97.8 F | HEIGHT: 72 IN | DIASTOLIC BLOOD PRESSURE: 74 MMHG | OXYGEN SATURATION: 98 %

## 2024-01-25 DIAGNOSIS — Z08 ENCOUNTER FOR FOLLOW-UP SURVEILLANCE OF MELANOMA: ICD-10-CM

## 2024-01-25 DIAGNOSIS — C43.59 MALIGNANT MELANOMA OF TORSO EXCLUDING BREAST (HCC): Primary | ICD-10-CM

## 2024-01-25 DIAGNOSIS — Z85.820 ENCOUNTER FOR FOLLOW-UP SURVEILLANCE OF MELANOMA: ICD-10-CM

## 2024-01-25 PROCEDURE — 76536 US EXAM OF HEAD AND NECK: CPT

## 2024-01-25 PROCEDURE — 99213 OFFICE O/P EST LOW 20 MIN: CPT | Performed by: INTERNAL MEDICINE

## 2024-01-25 NOTE — ASSESSMENT & PLAN NOTE
The patient is a 69-year-old male with stage 1b melanoma here for continued monitoring, follow-up in surveillance. He is doing well and has no clinical evidence of melanoma recurrence or metastasis. Labs reviewed and okay. All values are within normal limits. He continues to follow with dermatology as well as surgical oncology. He does have periodic imaging with ultrasounds of his head and neck lymph nodes which have been negative in the past. He had one earlier today with results pending. He will follow up with his primary care doctor on Monday, 01/29/2023. We will continue to follow him every 6 months at this time. He will return for skin check and blood work. Orders placed and scripts provided. He knows to call with issues or concerns prior to his next visit.

## 2024-01-25 NOTE — PROGRESS NOTES
[facility] [place]  [address]  [place] [address]  [phone]  [phone]     Date of Visit: 1/25/2024  Name: Andrea Quintero   YOB: 1955        Subjective    VISIT DIAGNOSIS:  Diagnoses and all orders for this visit:    Malignant melanoma of torso excluding breast (HCC)  -     CBC and differential; Future  -     Comprehensive metabolic panel; Future  -     LD,Blood; Future    Encounter for follow-up surveillance of melanoma  -     CBC and differential; Future  -     Comprehensive metabolic panel; Future  -     LD,Blood; Future        Oncology History   Malignant melanoma of torso excluding breast (HCC)   12/20/2021 Biopsy    Left upper back Melamona: shave biopsy  Thickness: 0.8mm  Ulceration: none   Mitoses: 0  Margins: Approaches both lateral margins and extends to the deep margin      12/20/2021 -  Cancer Staged    Staging form: Melanoma of the Skin, AJCC 8th Edition  - Clinical stage from 12/20/2021: Stage IB (cT1b, cN0, cM0) - Signed by Erum Knight MD on 1/27/2022 1/17/2022 Initial Diagnosis    Malignant melanoma of upper back (HCC)     2/15/2022 Surgery    A. Lymph node, sentinel, left neck #1:  Two lymph nodes, negative for metastatic melanoma (0/2).    B. Lymph node, sentinel, left neck #2:  Two lymph nodes, negative for metastatic melanoma (0/2).    C. Skin and soft tissue, upper left back, wide excision: Scar.        Cancer Staging   Malignant melanoma of torso excluding breast (HCC)  Staging form: Melanoma of the Skin, AJCC 8th Edition  - Clinical stage from 12/20/2021: Stage IB (cT1b, cN0, cM0) - Signed by Erum Knight MD on 1/27/2022          HISTORY OF PRESENT ILLNESS: Andrea Quintero is a 69 y.o. male  who presents for follow-up of malignant melanoma of torso excluding breasts.    Eliud vlades, he is doing well. He has no concern about anything on his skin. He is scheduled for another Moh's treatment here on 02/2024. He denies any  existence of any lumps or bumps. He was last seen on 01/2022.  He had undergone ultrasound on the lymph nodes in radiology today. He had the same on 3/2023. Dr. Magallanes does it every 3 months to every 6 months. He states his appetite is good. He last visited his dermatologist on 12/2023. He had his blood work done today and it came out good.    He describes feeling tired frequently during midafternoon even while sitting at his office mostly during winter. He denies feeling tired during summers. He does not sleep well at night. He will visit his primary care doctor on Monday. He denies doing exercise regularly due to cold, but he does it in summer.        Review of Systems   Constitutional:  Positive for fatigue. Negative for appetite change, fever and unexpected weight change.   HENT:   Negative for lump/mass, trouble swallowing and voice change.    Eyes:  Negative for icterus.   Respiratory:  Negative for cough, shortness of breath and wheezing.    Cardiovascular:  Negative for leg swelling.   Gastrointestinal:  Negative for abdominal pain, constipation, diarrhea, nausea and vomiting.   Genitourinary:  Negative for difficulty urinating and hematuria.    Musculoskeletal:  Negative for arthralgias, gait problem and myalgias.   Skin:  Negative for itching and rash.        No new, changing, or concerning lesions.   Neurological:  Negative for extremity weakness, gait problem, headaches, light-headedness and numbness.   Hematological:  Negative for adenopathy.        MEDICATIONS:    Current Outpatient Medications:     atorvastatin (LIPITOR) 20 mg tablet, Take 1 tablet (20 mg total) by mouth daily, Disp: 90 tablet, Rfl: 1    losartan (COZAAR) 100 MG tablet, Take 1 tablet (100 mg total) by mouth daily, Disp: 90 tablet, Rfl: 1    omeprazole (PriLOSEC) 40 MG capsule, Take 40 mg by mouth daily Every other day, Disp: , Rfl:     sildenafil (VIAGRA) 100 mg tablet, Take 0.5 tablets (50 mg total) by mouth daily as needed for  erectile dysfunction, Disp: 30 tablet, Rfl: 1     ALLERGIES:  Allergies   Allergen Reactions    Neomycin-Polymyxin-Dexameth      Annotation - [id number]: skin peeled around eyes    Pollen Extract         ACTIVE PROBLEMS:  Patient Active Problem List   Diagnosis    HTN (hypertension)    Hypercholesterolemia    Numerous moles    GERD (gastroesophageal reflux disease)    BMI 28.0-28.9,adult    Malignant melanoma of torso excluding breast (HCC)    Encounter for follow-up surveillance of melanoma          PAST MEDICAL HISTORY:   Past Medical History:   Diagnosis Date    Allergic     Allergic rhinitis     Resolved 1/14/2016     Allergy     Mild; spring and fall    Cancer (HCC)     melanoma    Colon polyp     Diverticulitis     Erectile dysfunction of nonorganic origin     Resolved 1/14/2016     GERD (gastroesophageal reflux disease)     Hyperlipidemia     Hypertension     Inflammatory bowel disease 11/42021    Diverticulitis    Rosacea     Resolved 1/14/2016         PAST SURGICAL HISTORY:  Past Surgical History:   Procedure Laterality Date    COLONOSCOPY      FACIAL/NECK BIOPSY Right 01/09/2018    Procedure: CHEEK and LOWER EYELID BCC EXCISION AND COMPLEX CLOSURE;  Surgeon: Anoop Jensen MD;  Location: [facility];  Service: Plastics    FL INJECTION LEFT HIP (NON ARTHROGRAM)  02/07/2022    LYMPH NODE BIOPSY Left 02/15/2022    Procedure: BIOPSY LYMPH NODE SENTINEL (NUC MED INJECTION AT 1300);  Surgeon: Alessia Magallanes MD;  Location[facility];  Service: Surgical Oncology    SKIN BIOPSY      SKIN LESION EXCISION Left 02/15/2022    Procedure: UPPER BACK LESION WIDE EXCISION;  Surgeon: Alessia Magallanes MD;  Location[facility];  Service: Surgical Oncology    WISDOM TOOTH EXTRACTION          SOCIAL HISTORY:  Social History     Socioeconomic History    Marital status: /Civil Union     Spouse name: None    Number of children: None    Years of education: None    Highest education level: None   Occupational  History    None   Tobacco Use    Smoking status: Passive Smoke Exposure - Never Smoker    Smokeless tobacco: Never    Tobacco comments:     cigar 6 times a year   Vaping Use    Vaping status: Never Used   Substance and Sexual Activity    Alcohol use: Yes     Alcohol/week: 7.0 standard drinks of alcohol     Types: 6 Glasses of wine, 1 Shots of liquor per week     Comment: occasionally    Drug use: No    Sexual activity: Yes     Partners: Female   Other Topics Concern    None   Social History Narrative    Former smoker - As per [employer]    Never used drugs - As per [employer]      Social Determinants of Health     Financial Resource Strain: Low Risk  (12/13/2022)    Overall Financial Resource Strain (CARDIA)     Difficulty of Paying Living Expenses: Not very hard   Food Insecurity: Not on file   Transportation Needs: No Transportation Needs (12/13/2022)    PRAPARE - Transportation     Lack of Transportation (Medical): No     Lack of Transportation (Non-Medical): No   Physical Activity: Not on file   Stress: Not on file   Social Connections: Not on file   Intimate Partner Violence: Not on file   Housing Stability: Not on file        FAMILY HISTORY:  Family History   Problem Relation Age of Onset    Skin cancer Mother     No Known Problems Father            Objective    PHYSICAL EXAMINATION:   Blood pressure 122/74, pulse 82, temperature 97.8 °F (36.6 °C), temperature source Temporal, resp. rate 18, height 6', weight 95.3 kg (210 lb), SpO2 98%.     Pain Score: 0-No pain            Physical Exam  Constitutional:       General: He is not in acute distress.     Appearance: Normal appearance. He is not ill-appearing or toxic-appearing.   HENT:      Head: Normocephalic and atraumatic.      Right Ear: External ear normal.      Left Ear: External ear normal.      Nose: Nose normal.      Mouth/Throat:      Mouth: Mucous membranes are moist.      Pharynx: Oropharynx is clear.   Eyes:      General: No scleral icterus.         Right eye: No discharge.         Left eye: No discharge.      Conjunctiva/sclera: Conjunctivae normal.   Cardiovascular:      Rate and Rhythm: Normal rate and regular rhythm.      Pulses: Normal pulses.      Heart sounds: No murmur heard.     No friction rub. No gallop.   Pulmonary:      Effort: Pulmonary effort is normal. No respiratory distress.      Breath sounds: Normal breath sounds. No wheezing or rales.   Abdominal:      General: Bowel sounds are normal. There is no distension.      Palpations: There is no mass.      Tenderness: There is no abdominal tenderness. There is no rebound.   Musculoskeletal:         General: No swelling or tenderness.      Cervical back: Normal range of motion. No rigidity.      Right lower leg: No edema.      Left lower leg: No edema.   Lymphadenopathy:      Head:      Right side of head: No submandibular, preauricular or posterior auricular adenopathy.      Left side of head: No submandibular, preauricular or posterior auricular adenopathy.      Cervical: No cervical adenopathy.      Right cervical: No superficial or posterior cervical adenopathy.     Left cervical: No superficial or posterior cervical adenopathy.      Upper Body:      Right upper body: No supraclavicular or axillary adenopathy.      Left upper body: No supraclavicular or axillary adenopathy.      Lower Body: No right inguinal adenopathy. No left inguinal adenopathy.   Skin:     General: Skin is warm.      Coloration: Skin is not jaundiced.      Findings: No lesion or rash.      Comments: Well healed surgical scar.  No evidence of recurrence at primary site.   Neurological:      General: No focal deficit present.      Mental Status: He is alert and oriented to person, place, and time.      Cranial Nerves: No cranial nerve deficit.      Motor: No weakness.      Gait: Gait normal.   Psychiatric:         Mood and Affect: Mood normal.         Behavior: Behavior normal.         Thought Content: Thought content normal.          Judgment: Judgment normal.         I reviewed lab data in the chart.    Lab report reviewed with patient.    Cholesterol is good, hemoglobin is fine, A1c level is good.  TSH done in June shows normal level.    WBC   Date Value Ref Range Status   01/10/2024 5.24 4.31 - 10.16 Thousand/uL Final   06/15/2023 5.21 4.31 - 10.16 Thousand/uL Final   09/06/2022 6.54 4.31 - 10.16 Thousand/uL Final     Hemoglobin   Date Value Ref Range Status   01/10/2024 15.7 12.0 - 17.0 g/dL Final   06/15/2023 15.0 12.0 - 17.0 g/dL Final   09/06/2022 15.4 12.0 - 17.0 g/dL Final     Platelets   Date Value Ref Range Status   01/10/2024 211 149 - 390 Thousands/uL Final   06/15/2023 188 149 - 390 Thousands/uL Final   09/06/2022 178 149 - 390 Thousands/uL Final     MCV   Date Value Ref Range Status   01/10/2024 96 82 - 98 fL Final   06/15/2023 95 82 - 98 fL Final   09/06/2022 94 82 - 98 fL Final      Potassium   Date Value Ref Range Status   01/10/2024 4.0 3.5 - 5.3 mmol/L Final   06/15/2023 4.1 3.5 - 5.3 mmol/L Final   09/06/2022 3.7 3.5 - 5.3 mmol/L Final     Chloride   Date Value Ref Range Status   01/10/2024 106 96 - 108 mmol/L Final   06/15/2023 110 (H) 96 - 108 mmol/L Final   09/06/2022 104 96 - 108 mmol/L Final     CO2   Date Value Ref Range Status   01/10/2024 27 21 - 32 mmol/L Final   06/15/2023 23 21 - 32 mmol/L Final   09/06/2022 26 21 - 32 mmol/L Final     BUN   Date Value Ref Range Status   01/10/2024 18 5 - 25 mg/dL Final   06/15/2023 19 5 - 25 mg/dL Final   09/06/2022 19 5 - 25 mg/dL Final     Creatinine   Date Value Ref Range Status   01/10/2024 1.21 0.60 - 1.30 mg/dL Final     Comment:     Standardized to IDMS reference method   06/15/2023 1.21 0.60 - 1.30 mg/dL Final     Comment:     Standardized to IDMS reference method   09/06/2022 1.13 0.60 - 1.30 mg/dL Final     Comment:     Standardized to IDMS reference method     Glucose   Date Value Ref Range Status   11/04/2021 94 65 - 140 mg/dL Final     Comment:     If the  patient is fasting, the ADA then defines impaired fasting glucose as > 100 mg/dL and diabetes as > or equal to 123 mg/dL.  Specimen collection should occur prior to Sulfasalazine administration due to the potential for falsely depressed results. Specimen collection should occur prior to Sulfapyridine administration due to the potential for falsely elevated results.     Calcium   Date Value Ref Range Status   01/10/2024 9.5 8.4 - 10.2 mg/dL Final   06/15/2023 9.3 8.3 - 10.1 mg/dL Final   09/06/2022 9.0 8.3 - 10.1 mg/dL Final     Albumin   Date Value Ref Range Status   01/10/2024 4.2 3.5 - 5.0 g/dL Final   06/15/2023 3.8 3.5 - 5.0 g/dL Final   09/06/2022 4.0 3.5 - 5.0 g/dL Final     Total Bilirubin   Date Value Ref Range Status   01/10/2024 0.96 0.20 - 1.00 mg/dL Final     Comment:     Use of this assay is not recommended for patients undergoing treatment with eltrombopag due to the potential for falsely elevated results.  N-acetyl-p-benzoquinone imine (metabolite of Acetaminophen) will generate erroneously low results in samples for patients that have taken an overdose of Acetaminophen.   06/15/2023 0.94 0.20 - 1.00 mg/dL Final     Comment:     Use of this assay is not recommended for patients undergoing treatment with eltrombopag due to the potential for falsely elevated results.   09/06/2022 1.08 (H) 0.20 - 1.00 mg/dL Final     Comment:     Use of this assay is not recommended for patients undergoing treatment with eltrombopag due to the potential for falsely elevated results.     Alkaline Phosphatase   Date Value Ref Range Status   01/10/2024 50 34 - 104 U/L Final   06/15/2023 57 46 - 116 U/L Final   09/06/2022 56 46 - 116 U/L Final     AST   Date Value Ref Range Status   01/10/2024 18 13 - 39 U/L Final   06/15/2023 16 5 - 45 U/L Final     Comment:     Specimen collection should occur prior to Sulfasalazine administration due to the potential for falsely depressed results.    09/06/2022 18 5 - 45 U/L Final      "Comment:     Specimen collection should occur prior to Sulfasalazine administration due to the potential for falsely depressed results.      ALT   Date Value Ref Range Status   01/10/2024 20 7 - 52 U/L Final     Comment:     Specimen collection should occur prior to Sulfasalazine administration due to the potential for falsely depressed results.    06/15/2023 29 12 - 78 U/L Final     Comment:     Specimen collection should occur prior to Sulfasalazine and/or Sulfapyridine administration due to the potential for falsely depressed results.    09/06/2022 29 12 - 78 U/L Final     Comment:     Specimen collection should occur prior to Sulfasalazine administration due to the potential for falsely depressed results.       LD   Date Value Ref Range Status   01/10/2024 168 140 - 271 U/L Final   06/15/2023 197 81 - 234 U/L Final   09/06/2022 157 81 - 234 U/L Final     No results found for: \"TSH\"  No results found for: \"O9YLSCJ\"   No results found for: \"FREET4\"      RECENT IMAGING:  No results found.          Assessment/Plan  1. Malignant melanoma of torso excluding breast (HCC)  -     CBC and differential; Future; Expected date: 07/25/2024  -     Comprehensive metabolic panel; Future; Expected date: 07/25/2024  -     LD,Blood; Future; Expected date: 07/25/2024    2. Encounter for follow-up surveillance of melanoma  Assessment & Plan:  The patient is a 69-year-old male with stage 1b melanoma here for continued monitoring, follow-up in surveillance. He is doing well and has no clinical evidence of melanoma recurrence or metastasis. Labs reviewed and okay. All values are within normal limits. He continues to follow with dermatology as well as surgical oncology. He does have periodic imaging with ultrasounds of his head and neck lymph nodes which have been negative in the past. He had one earlier today with results pending. He will follow up with his primary care doctor on Monday, 01/29/2023. We will continue to follow him every " 6 months at this time. He will return for skin check and blood work. Orders placed and scripts provided. He knows to call with issues or concerns prior to his next visit.    Orders:  -     CBC and differential; Future; Expected date: 07/25/2024  -     Comprehensive metabolic panel; Future; Expected date: 07/25/2024  -     LD,Blood; Future; Expected date: 07/25/2024         Return in about 6 months (around 7/25/2024) for Office Visit, labs.     Erum Knight MD, PhD    Transcribed for Erum Knight MD, by BISMITA BEHERA on 01/25/24 at 11:49 AM. Powered by [employer].

## 2024-01-25 NOTE — LETTER
January 25, 2024     Alessia Magallanes MD  [address]  [facility] [address]    Patient: Andrea Quintero   YOB: 1955   Date of Visit: 1/25/2024       Dear Dr. Magallanes:    Thank you for referring Andrea Quintero to me for evaluation. Below are my notes for this consultation.    If you have questions, please do not hesitate to call me. I look forward to following your patient along with you.         Sincerely,        Erum Knight MD        CC: MD Hodan Quiroz PA-C Joseph J Zaladonis Jr., MD Melissa A Wilson, MD  1/25/2024  2:45 PM  Sign when Signing Visit   [facility] [place]  [address] [address]  [phone]  [phone]     Date of Visit: 1/25/2024  Name: Andrea Quintero   YOB: 1955        Subjective    VISIT DIAGNOSIS:  Diagnoses and all orders for this visit:    Malignant melanoma of torso excluding breast (HCC)  -     CBC and differential; Future  -     Comprehensive metabolic panel; Future  -     LD,Blood; Future    Encounter for follow-up surveillance of melanoma  -     CBC and differential; Future  -     Comprehensive metabolic panel; Future  -     LD,Blood; Future        Oncology History   Malignant melanoma of torso excluding breast (HCC)   12/20/2021 Biopsy    Left upper back Melamona: shave biopsy  Thickness: 0.8mm  Ulceration: none   Mitoses: 0  Margins: Approaches both lateral margins and extends to the deep margin      12/20/2021 -  Cancer Staged    Staging form: Melanoma of the Skin, AJCC 8th Edition  - Clinical stage from 12/20/2021: Stage IB (cT1b, cN0, cM0) - Signed by Erum Knight MD on 1/27/2022 1/17/2022 Initial Diagnosis    Malignant melanoma of upper back (HCC)     2/15/2022 Surgery    A. Lymph node, sentinel, left neck #1:  Two lymph nodes, negative for metastatic melanoma (0/2).    B. Lymph node, sentinel, left neck #2:  Two lymph nodes, negative for metastatic melanoma  (0/2).    C. Skin and soft tissue, upper left back, wide excision: Scar.        Cancer Staging   Malignant melanoma of torso excluding breast (HCC)  Staging form: Melanoma of the Skin, AJCC 8th Edition  - Clinical stage from 12/20/2021: Stage IB (cT1b, cN0, cM0) - Signed by Erum Knight MD on 1/27/2022          HISTORY OF PRESENT ILLNESS: Anrdea Quintero is a 69 y.o. male  who presents for follow-up of malignant melanoma of torso excluding breasts.    Eliud valdes, he is doing well. He has no concern about anything on his skin. He is scheduled for another Moh's treatment here on 02/2024. He denies any existence of any lumps or bumps. He was last seen on 01/2022.  He had undergone ultrasound on the lymph nodes in radiology today. He had the same on 3/2023. Dr. Magallanes does it every 3 months to every 6 months. He states his appetite is good. He last visited his dermatologist on 12/2023. He had his blood work done today and it came out good.    He describes feeling tired frequently during midafternoon even while sitting at his office mostly during winter. He denies feeling tired during summers. He does not sleep well at night. He will visit his primary care doctor on Monday. He denies doing exercise regularly due to cold, but he does it in summer.        Review of Systems   Constitutional:  Positive for fatigue. Negative for appetite change, fever and unexpected weight change.   HENT:   Negative for lump/mass, trouble swallowing and voice change.    Eyes:  Negative for icterus.   Respiratory:  Negative for cough, shortness of breath and wheezing.    Cardiovascular:  Negative for leg swelling.   Gastrointestinal:  Negative for abdominal pain, constipation, diarrhea, nausea and vomiting.   Genitourinary:  Negative for difficulty urinating and hematuria.    Musculoskeletal:  Negative for arthralgias, gait problem and myalgias.   Skin:  Negative for itching and rash.        No new, changing, or concerning lesions.    Neurological:  Negative for extremity weakness, gait problem, headaches, light-headedness and numbness.   Hematological:  Negative for adenopathy.        MEDICATIONS:    Current Outpatient Medications:   •  atorvastatin (LIPITOR) 20 mg tablet, Take 1 tablet (20 mg total) by mouth daily, Disp: 90 tablet, Rfl: 1  •  losartan (COZAAR) 100 MG tablet, Take 1 tablet (100 mg total) by mouth daily, Disp: 90 tablet, Rfl: 1  •  omeprazole (PriLOSEC) 40 MG capsule, Take 40 mg by mouth daily Every other day, Disp: , Rfl:   •  sildenafil (VIAGRA) 100 mg tablet, Take 0.5 tablets (50 mg total) by mouth daily as needed for erectile dysfunction, Disp: 30 tablet, Rfl: 1     ALLERGIES:  Allergies   Allergen Reactions   • Neomycin-Polymyxin-Dexameth      Annotation - [id number]: skin peeled around eyes   • Pollen Extract         ACTIVE PROBLEMS:  Patient Active Problem List   Diagnosis   • HTN (hypertension)   • Hypercholesterolemia   • Numerous moles   • GERD (gastroesophageal reflux disease)   • BMI 28.0-28.9,adult   • Malignant melanoma of torso excluding breast (HCC)   • Encounter for follow-up surveillance of melanoma          PAST MEDICAL HISTORY:   Past Medical History:   Diagnosis Date   • Allergic    • Allergic rhinitis     Resolved 1/14/2016    • Allergy     Mild; spring and fall   • Cancer (HCC)     melanoma   • Colon polyp    • Diverticulitis    • Erectile dysfunction of nonorganic origin     Resolved 1/14/2016    • GERD (gastroesophageal reflux disease)    • Hyperlipidemia    • Hypertension    • Inflammatory bowel disease 11/42021    Diverticulitis   • Rosacea     Resolved 1/14/2016         PAST SURGICAL HISTORY:  Past Surgical History:   Procedure Laterality Date   • COLONOSCOPY     • FACIAL/NECK BIOPSY Right 01/09/2018    Procedure: CHEEK and LOWER EYELID BCC EXCISION AND COMPLEX CLOSURE;  Surgeon: Anoop Jensen MD;  Location: [facility];  Service: Plastics   • FL INJECTION LEFT HIP (NON ARTHROGRAM)  02/07/2022    • LYMPH NODE BIOPSY Left 02/15/2022    Procedure: BIOPSY LYMPH NODE SENTINEL (NUC MED INJECTION AT 1300);  Surgeon: Alessia Magallanes MD;  [facility];  Service: Surgical Oncology   • SKIN BIOPSY     • SKIN LESION EXCISION Left 02/15/2022    Procedure: UPPER BACK LESION WIDE EXCISION;  Surgeon: Alessia Magallanes MD;  Location[facility];  Service: Surgical Oncology   • WISDOM TOOTH EXTRACTION          SOCIAL HISTORY:  Social History     Socioeconomic History   • Marital status: /Civil Union     Spouse name: None   • Number of children: None   • Years of education: None   • Highest education level: None   Occupational History   • None   Tobacco Use   • Smoking status: Passive Smoke Exposure - Never Smoker   • Smokeless tobacco: Never   • Tobacco comments:     cigar 6 times a year   Vaping Use   • Vaping status: Never Used   Substance and Sexual Activity   • Alcohol use: Yes     Alcohol/week: 7.0 standard drinks of alcohol     Types: 6 Glasses of wine, 1 Shots of liquor per week     Comment: occasionally   • Drug use: No   • Sexual activity: Yes     Partners: Female   Other Topics Concern   • None   Social History Narrative    Former smoker - As per [employer]    Never used drugs - As per [employer]      Social Determinants of Health     Financial Resource Strain: Low Risk  (12/13/2022)    Overall Financial Resource Strain (CARDIA)    • Difficulty of Paying Living Expenses: Not very hard   Food Insecurity: Not on file   Transportation Needs: No Transportation Needs (12/13/2022)    PRAPARE - Transportation    • Lack of Transportation (Medical): No    • Lack of Transportation (Non-Medical): No   Physical Activity: Not on file   Stress: Not on file   Social Connections: Not on file   Intimate Partner Violence: Not on file   Housing Stability: Not on file        FAMILY HISTORY:  Family History   Problem Relation Age of Onset   • Skin cancer Mother    • No Known Problems Father             Objective    PHYSICAL EXAMINATION:   Blood pressure 122/74, pulse 82, temperature 97.8 °F (36.6 °C), temperature source Temporal, resp. rate 18, height 6', weight 95.3 kg (210 lb), SpO2 98%.     Pain Score: 0-No pain            Physical Exam  Constitutional:       General: He is not in acute distress.     Appearance: Normal appearance. He is not ill-appearing or toxic-appearing.   HENT:      Head: Normocephalic and atraumatic.      Right Ear: External ear normal.      Left Ear: External ear normal.      Nose: Nose normal.      Mouth/Throat:      Mouth: Mucous membranes are moist.      Pharynx: Oropharynx is clear.   Eyes:      General: No scleral icterus.        Right eye: No discharge.         Left eye: No discharge.      Conjunctiva/sclera: Conjunctivae normal.   Cardiovascular:      Rate and Rhythm: Normal rate and regular rhythm.      Pulses: Normal pulses.      Heart sounds: No murmur heard.     No friction rub. No gallop.   Pulmonary:      Effort: Pulmonary effort is normal. No respiratory distress.      Breath sounds: Normal breath sounds. No wheezing or rales.   Abdominal:      General: Bowel sounds are normal. There is no distension.      Palpations: There is no mass.      Tenderness: There is no abdominal tenderness. There is no rebound.   Musculoskeletal:         General: No swelling or tenderness.      Cervical back: Normal range of motion. No rigidity.      Right lower leg: No edema.      Left lower leg: No edema.   Lymphadenopathy:      Head:      Right side of head: No submandibular, preauricular or posterior auricular adenopathy.      Left side of head: No submandibular, preauricular or posterior auricular adenopathy.      Cervical: No cervical adenopathy.      Right cervical: No superficial or posterior cervical adenopathy.     Left cervical: No superficial or posterior cervical adenopathy.      Upper Body:      Right upper body: No supraclavicular or axillary adenopathy.      Left upper body: No  supraclavicular or axillary adenopathy.      Lower Body: No right inguinal adenopathy. No left inguinal adenopathy.   Skin:     General: Skin is warm.      Coloration: Skin is not jaundiced.      Findings: No lesion or rash.      Comments: Well healed surgical scar.  No evidence of recurrence at primary site.   Neurological:      General: No focal deficit present.      Mental Status: He is alert and oriented to person, place, and time.      Cranial Nerves: No cranial nerve deficit.      Motor: No weakness.      Gait: Gait normal.   Psychiatric:         Mood and Affect: Mood normal.         Behavior: Behavior normal.         Thought Content: Thought content normal.         Judgment: Judgment normal.         I reviewed lab data in the chart.    Lab report reviewed with patient.    Cholesterol is good, hemoglobin is fine, A1c level is good.  TSH done in June shows normal level.    WBC   Date Value Ref Range Status   01/10/2024 5.24 4.31 - 10.16 Thousand/uL Final   06/15/2023 5.21 4.31 - 10.16 Thousand/uL Final   09/06/2022 6.54 4.31 - 10.16 Thousand/uL Final     Hemoglobin   Date Value Ref Range Status   01/10/2024 15.7 12.0 - 17.0 g/dL Final   06/15/2023 15.0 12.0 - 17.0 g/dL Final   09/06/2022 15.4 12.0 - 17.0 g/dL Final     Platelets   Date Value Ref Range Status   01/10/2024 211 149 - 390 Thousands/uL Final   06/15/2023 188 149 - 390 Thousands/uL Final   09/06/2022 178 149 - 390 Thousands/uL Final     MCV   Date Value Ref Range Status   01/10/2024 96 82 - 98 fL Final   06/15/2023 95 82 - 98 fL Final   09/06/2022 94 82 - 98 fL Final      Potassium   Date Value Ref Range Status   01/10/2024 4.0 3.5 - 5.3 mmol/L Final   06/15/2023 4.1 3.5 - 5.3 mmol/L Final   09/06/2022 3.7 3.5 - 5.3 mmol/L Final     Chloride   Date Value Ref Range Status   01/10/2024 106 96 - 108 mmol/L Final   06/15/2023 110 (H) 96 - 108 mmol/L Final   09/06/2022 104 96 - 108 mmol/L Final     CO2   Date Value Ref Range Status   01/10/2024 27 21 - 32  mmol/L Final   06/15/2023 23 21 - 32 mmol/L Final   09/06/2022 26 21 - 32 mmol/L Final     BUN   Date Value Ref Range Status   01/10/2024 18 5 - 25 mg/dL Final   06/15/2023 19 5 - 25 mg/dL Final   09/06/2022 19 5 - 25 mg/dL Final     Creatinine   Date Value Ref Range Status   01/10/2024 1.21 0.60 - 1.30 mg/dL Final     Comment:     Standardized to IDMS reference method   06/15/2023 1.21 0.60 - 1.30 mg/dL Final     Comment:     Standardized to IDMS reference method   09/06/2022 1.13 0.60 - 1.30 mg/dL Final     Comment:     Standardized to IDMS reference method     Glucose   Date Value Ref Range Status   11/04/2021 94 65 - 140 mg/dL Final     Comment:     If the patient is fasting, the ADA then defines impaired fasting glucose as > 100 mg/dL and diabetes as > or equal to 123 mg/dL.  Specimen collection should occur prior to Sulfasalazine administration due to the potential for falsely depressed results. Specimen collection should occur prior to Sulfapyridine administration due to the potential for falsely elevated results.     Calcium   Date Value Ref Range Status   01/10/2024 9.5 8.4 - 10.2 mg/dL Final   06/15/2023 9.3 8.3 - 10.1 mg/dL Final   09/06/2022 9.0 8.3 - 10.1 mg/dL Final     Albumin   Date Value Ref Range Status   01/10/2024 4.2 3.5 - 5.0 g/dL Final   06/15/2023 3.8 3.5 - 5.0 g/dL Final   09/06/2022 4.0 3.5 - 5.0 g/dL Final     Total Bilirubin   Date Value Ref Range Status   01/10/2024 0.96 0.20 - 1.00 mg/dL Final     Comment:     Use of this assay is not recommended for patients undergoing treatment with eltrombopag due to the potential for falsely elevated results.  N-acetyl-p-benzoquinone imine (metabolite of Acetaminophen) will generate erroneously low results in samples for patients that have taken an overdose of Acetaminophen.   06/15/2023 0.94 0.20 - 1.00 mg/dL Final     Comment:     Use of this assay is not recommended for patients undergoing treatment with eltrombopag due to the potential for  "falsely elevated results.   09/06/2022 1.08 (H) 0.20 - 1.00 mg/dL Final     Comment:     Use of this assay is not recommended for patients undergoing treatment with eltrombopag due to the potential for falsely elevated results.     Alkaline Phosphatase   Date Value Ref Range Status   01/10/2024 50 34 - 104 U/L Final   06/15/2023 57 46 - 116 U/L Final   09/06/2022 56 46 - 116 U/L Final     AST   Date Value Ref Range Status   01/10/2024 18 13 - 39 U/L Final   06/15/2023 16 5 - 45 U/L Final     Comment:     Specimen collection should occur prior to Sulfasalazine administration due to the potential for falsely depressed results.    09/06/2022 18 5 - 45 U/L Final     Comment:     Specimen collection should occur prior to Sulfasalazine administration due to the potential for falsely depressed results.      ALT   Date Value Ref Range Status   01/10/2024 20 7 - 52 U/L Final     Comment:     Specimen collection should occur prior to Sulfasalazine administration due to the potential for falsely depressed results.    06/15/2023 29 12 - 78 U/L Final     Comment:     Specimen collection should occur prior to Sulfasalazine and/or Sulfapyridine administration due to the potential for falsely depressed results.    09/06/2022 29 12 - 78 U/L Final     Comment:     Specimen collection should occur prior to Sulfasalazine administration due to the potential for falsely depressed results.       LD   Date Value Ref Range Status   01/10/2024 168 140 - 271 U/L Final   06/15/2023 197 81 - 234 U/L Final   09/06/2022 157 81 - 234 U/L Final     No results found for: \"TSH\"  No results found for: \"Q3XYFZP\"   No results found for: \"FREET4\"      RECENT IMAGING:  No results found.          Assessment/Plan  1. Malignant melanoma of torso excluding breast (HCC)  -     CBC and differential; Future; Expected date: 07/25/2024  -     Comprehensive metabolic panel; Future; Expected date: 07/25/2024  -     LD,Blood; Future; Expected date: 07/25/2024    2. " Encounter for follow-up surveillance of melanoma  Assessment & Plan:  The patient is a 69-year-old male with stage 1b melanoma here for continued monitoring, follow-up in surveillance. He is doing well and has no clinical evidence of melanoma recurrence or metastasis. Labs reviewed and okay. All values are within normal limits. He continues to follow with dermatology as well as surgical oncology. He does have periodic imaging with ultrasounds of his head and neck lymph nodes which have been negative in the past. He had one earlier today with results pending. He will follow up with his primary care doctor on Monday, 01/29/2023. We will continue to follow him every 6 months at this time. He will return for skin check and blood work. Orders placed and scripts provided. He knows to call with issues or concerns prior to his next visit.    Orders:  -     CBC and differential; Future; Expected date: 07/25/2024  -     Comprehensive metabolic panel; Future; Expected date: 07/25/2024  -     LD,Blood; Future; Expected date: 07/25/2024         Return in about 6 months (around 7/25/2024) for Office Visit, labs.     Erum Knight MD, PhD    Transcribed for Erum Knight MD, by BISMITA BEHERA on 01/25/24 at 11:49 AM. Powered by [employer].

## 2024-01-29 ENCOUNTER — OFFICE VISIT (OUTPATIENT)
Dept: INTERNAL MEDICINE CLINIC | Age: 69
End: 2024-01-29
Payer: MEDICARE

## 2024-01-29 VITALS
DIASTOLIC BLOOD PRESSURE: 76 MMHG | OXYGEN SATURATION: 98 % | SYSTOLIC BLOOD PRESSURE: 118 MMHG | WEIGHT: 211.6 LBS | HEIGHT: 72 IN | TEMPERATURE: 97.2 F | BODY MASS INDEX: 28.66 KG/M2 | HEART RATE: 81 BPM

## 2024-01-29 DIAGNOSIS — E78.2 MIXED HYPERLIPIDEMIA: ICD-10-CM

## 2024-01-29 DIAGNOSIS — C43.59 MALIGNANT MELANOMA OF TORSO EXCLUDING BREAST (HCC): ICD-10-CM

## 2024-01-29 DIAGNOSIS — K21.9 GASTROESOPHAGEAL REFLUX DISEASE WITHOUT ESOPHAGITIS: ICD-10-CM

## 2024-01-29 DIAGNOSIS — E78.00 HYPERCHOLESTEROLEMIA: ICD-10-CM

## 2024-01-29 DIAGNOSIS — I10 PRIMARY HYPERTENSION: Primary | ICD-10-CM

## 2024-01-29 PROCEDURE — G0439 PPPS, SUBSEQ VISIT: HCPCS | Performed by: INTERNAL MEDICINE

## 2024-01-29 PROCEDURE — 99214 OFFICE O/P EST MOD 30 MIN: CPT | Performed by: INTERNAL MEDICINE

## 2024-01-29 RX ORDER — CLOBETASOL PROPIONATE 0.5 MG/G
AEROSOL, FOAM TOPICAL
COMMUNITY
Start: 2023-12-17

## 2024-01-29 RX ORDER — ATORVASTATIN CALCIUM 20 MG/1
20 TABLET, FILM COATED ORAL DAILY
Qty: 90 TABLET | Refills: 1 | Status: SHIPPED | OUTPATIENT
Start: 2024-01-29

## 2024-01-29 NOTE — PATIENT INSTRUCTIONS
Medicare Preventive Visit Patient Instructions  Thank you for completing your Welcome to Medicare Visit or Medicare Annual Wellness Visit today. Your next wellness visit will be due in one year (1/29/2025).  The screening/preventive services that you may require over the next 5-10 years are detailed below. Some tests may not apply to you based off risk factors and/or age. Screening tests ordered at today's visit but not completed yet may show as past due. Also, please note that scanned in results may not display below.  Preventive Screenings:  Service Recommendations Previous Testing/Comments   Colorectal Cancer Screening  Colonoscopy    Fecal Occult Blood Test (FOBT)/Fecal Immunochemical Test (FIT)  Fecal DNA/Cologuard Test  Flexible Sigmoidoscopy Age: 45-75 years old   Colonoscopy: every 10 years (May be performed more frequently if at higher risk)  OR  FOBT/FIT: every 1 year  OR  Cologuard: every 3 years  OR  Sigmoidoscopy: every 5 years  Screening may be recommended earlier than age 45 if at higher risk for colorectal cancer. Also, an individualized decision between you and your healthcare provider will decide whether screening between the ages of 76-85 would be appropriate. Colonoscopy: 01/13/2023  FOBT/FIT: Not on file  Cologuard: Not on file  Sigmoidoscopy: Not on file    Screening Current     Prostate Cancer Screening Individualized decision between patient and health care provider in men between ages of 55-69   Medicare will cover every 12 months beginning on the day after your 50th birthday PSA: 0.87 ng/mL     Screening Current     Hepatitis C Screening Once for adults born between 1945 and 1965  More frequently in patients at high risk for Hepatitis C Hep C Antibody: 06/15/2023    Screening Current   Diabetes Screening 1-2 times per year if you're at risk for diabetes or have pre-diabetes Fasting glucose: 85 mg/dL (1/10/2024)  A1C: 5.3 % (1/10/2024)  Screening Current   Cholesterol Screening Once every 5  years if you don't have a lipid disorder. May order more often based on risk factors. Lipid panel: 01/10/2024  Screening Not Indicated  History Lipid Disorder      Other Preventive Screenings Covered by Medicare:  Abdominal Aortic Aneurysm (AAA) Screening: covered once if your at risk. You're considered to be at risk if you have a family history of AAA or a male between the age of 65-75 who smoking at least 100 cigarettes in your lifetime.  Lung Cancer Screening: covers low dose CT scan once per year if you meet all of the following conditions: (1) Age 55-77; (2) No signs or symptoms of lung cancer; (3) Current smoker or have quit smoking within the last 15 years; (4) You have a tobacco smoking history of at least 20 pack years (packs per day x number of years you smoked); (5) You get a written order from a healthcare provider.  Glaucoma Screening: covered annually if you're considered high risk: (1) You have diabetes OR (2) Family history of glaucoma OR (3)  aged 50 and older OR (4)  American aged 65 and older  Osteoporosis Screening: covered every 2 years if you meet one of the following conditions: (1) Have a vertebral abnormality; (2) On glucocorticoid therapy for more than 3 months; (3) Have primary hyperparathyroidism; (4) On osteoporosis medications and need to assess response to drug therapy.  HIV Screening: covered annually if you're between the age of 15-65. Also covered annually if you are younger than 15 and older than 65 with risk factors for HIV infection. For pregnant patients, it is covered up to 3 times per pregnancy.    Immunizations:  Immunization Recommendations   Influenza Vaccine Annual influenza vaccination during flu season is recommended for all persons aged >= 6 months who do not have contraindications   Pneumococcal Vaccine   * Pneumococcal conjugate vaccine = PCV13 (Prevnar 13), PCV15 (Vaxneuvance), PCV20 (Prevnar 20)  * Pneumococcal polysaccharide vaccine = PPSV23  (Pneumovax) Adults 19-63 yo with certain risk factors or if 65+ yo  If never received any pneumonia vaccine: recommend Prevnar 20 (PCV20)  Give PCV20 if previously received 1 dose of PCV13 or PPSV23   Hepatitis B Vaccine 3 dose series if at intermediate or high risk (ex: diabetes, end stage renal disease, liver disease)   Respiratory syncytial virus (RSV) Vaccine - COVERED BY MEDICARE PART D  * RSVPreF3 (Arexvy) CDC recommends that adults 60 years of age and older may receive a single dose of RSV vaccine using shared clinical decision-making (SCDM)   Tetanus (Td) Vaccine - COST NOT COVERED BY MEDICARE PART B Following completion of primary series, a booster dose should be given every 10 years to maintain immunity against tetanus. Td may also be given as tetanus wound prophylaxis.   Tdap Vaccine - COST NOT COVERED BY MEDICARE PART B Recommended at least once for all adults. For pregnant patients, recommended with each pregnancy.   Shingles Vaccine (Shingrix) - COST NOT COVERED BY MEDICARE PART B  2 shot series recommended in those 19 years and older who have or will have weakened immune systems or those 50 years and older     Health Maintenance Due:      Topic Date Due   • Colorectal Cancer Screening  01/12/2028   • Hepatitis C Screening  Completed     Immunizations Due:      Topic Date Due   • Pneumococcal Vaccine: 65+ Years (1 - PCV) Never done   • Influenza Vaccine (1) 09/01/2023   • COVID-19 Vaccine (4 - 2023-24 season) 09/01/2023     Advance Directives   What are advance directives?  Advance directives are legal documents that state your wishes and plans for medical care. These plans are made ahead of time in case you lose your ability to make decisions for yourself. Advance directives can apply to any medical decision, such as the treatments you want, and if you want to donate organs.   What are the types of advance directives?  There are many types of advance directives, and each state has rules about how to  use them. You may choose a combination of any of the following:  Living will:  This is a written record of the treatment you want. You can also choose which treatments you do not want, which to limit, and which to stop at a certain time. This includes surgery, medicine, IV fluid, and tube feedings.   Durable power of  for healthcare (DPAHC):  This is a written record that states who you want to make healthcare choices for you when you are unable to make them for yourself. This person, called a proxy, is usually a family member or a friend. You may choose more than 1 proxy.  Do not resuscitate (DNR) order:  A DNR order is used in case your heart stops beating or you stop breathing. It is a request not to have certain forms of treatment, such as CPR. A DNR order may be included in other types of advance directives.  Medical directive:  This covers the care that you want if you are in a coma, near death, or unable to make decisions for yourself. You can list the treatments you want for each condition. Treatment may include pain medicine, surgery, blood transfusions, dialysis, IV or tube feedings, and a ventilator (breathing machine).  Values history:  This document has questions about your views, beliefs, and how you feel and think about life. This information can help others choose the care that you would choose.  Why are advance directives important?  An advance directive helps you control your care. Although spoken wishes may be used, it is better to have your wishes written down. Spoken wishes can be misunderstood, or not followed. Treatments may be given even if you do not want them. An advance directive may make it easier for your family to make difficult choices about your care.   Weight Management   Why it is important to manage your weight:  Being overweight increases your risk of health conditions such as heart disease, high blood pressure, type 2 diabetes, and certain types of cancer. It can also  increase your risk for osteoarthritis, sleep apnea, and other respiratory problems. Aim for a slow, steady weight loss. Even a small amount of weight loss can lower your risk of health problems.  How to lose weight safely:  A safe and healthy way to lose weight is to eat fewer calories and get regular exercise. You can lose up about 1 pound a week by decreasing the number of calories you eat by 500 calories each day.   Healthy meal plan for weight management:  A healthy meal plan includes a variety of foods, contains fewer calories, and helps you stay healthy. A healthy meal plan includes the following:  Eat whole-grain foods more often.  A healthy meal plan should contain fiber. Fiber is the part of grains, fruits, and vegetables that is not broken down by your body. Whole-grain foods are healthy and provide extra fiber in your diet. Some examples of whole-grain foods are whole-wheat breads and pastas, oatmeal, brown rice, and bulgur.  Eat a variety of vegetables every day.  Include dark, leafy greens such as spinach, kale, kayla greens, and mustard greens. Eat yellow and orange vegetables such as carrots, sweet potatoes, and winter squash.   Eat a variety of fruits every day.  Choose fresh or canned fruit (canned in its own juice or light syrup) instead of juice. Fruit juice has very little or no fiber.  Eat low-fat dairy foods.  Drink fat-free (skim) milk or 1% milk. Eat fat-free yogurt and low-fat cottage cheese. Try low-fat cheeses such as mozzarella and other reduced-fat cheeses.  Choose meat and other protein foods that are low in fat.  Choose beans or other legumes such as split peas or lentils. Choose fish, skinless poultry (chicken or turkey), or lean cuts of red meat (beef or pork). Before you cook meat or poultry, cut off any visible fat.   Use less fat and oil.  Try baking foods instead of frying them. Add less fat, such as margarine, sour cream, regular salad dressing and mayonnaise to foods. Eat  "fewer high-fat foods. Some examples of high-fat foods include french fries, doughnuts, ice cream, and cakes.  Eat fewer sweets.  Limit foods and drinks that are high in sugar. This includes candy, cookies, regular soda, and sweetened drinks.  Exercise:  Exercise at least 30 minutes per day on most days of the week. Some examples of exercise include walking, biking, dancing, and swimming. You can also fit in more physical activity by taking the stairs instead of the elevator or parking farther away from stores. Ask your healthcare provider about the best exercise plan for you.   Alcohol Use and Your Health    Drinking too much can harm your health.  Excessive alcohol use leads to about 88,000 death in the [place] each year, and shortens the life of those who diet by almost 30 years.  Further, excessive drinking cost the economy $249 billion in 2010.  Most excessive drinkers are not alcohol dependent.    Excessive alcohol use has immediate effects that increase the risk of many harmful health conditions.  These are most often the result of binge drinking.  Over time, excessive alcohol use can lead to the development of chronic diseases and other series health problems.    What is considered a \"drink\"?        Excessive alcohol use includes:  Binge Drinking: For women, 4 or more drinks consumed on one occasion. For men, 5 or more drinks consumed on one occasion.  Heavy Drinking: For women, 8 or more drinks per week. For men, 15 or more drinks per week  Any alcohol used by pregnant women  Any alcohol used by those under the age of 21 years    If you choose to drink, do so in moderation:  Do not drink at all if you are under the age of 21, or if you are or may be pregnant, or have health problems that could be made worse by drinking.  For women, up to 1 drink per day  For men, up to 2 drinks a day    No one should begin drinking or drink more frequently based on potential health benefits    Short-Term Health " Risks:  Injuries: motor vehicle crashes, falls, drownings, burns  Violence: homicide, suicide, sexual assault, intimate partner violence  Alcohol poisoning  Reproductive health: risky sexual behaviors, unintended prengnacy, sexually transmitted diseases, miscarriage, stillbirth, fetal alcohol syndrome    Long-Term Health Risks:  Chronic diseases: high blood pressure, heart disease, stroke, liver disease, digestive problems  Cancers: breast, mouth and throat, liver, colon  Learning and memory problems: dementia, poor school performance  Mental health: depression, anxiety, insomnia  Social problems: lost productivity, family problems, unemployment  Alcohol dependence    For support and more information:  Substance Abuse and Mental Health Services Administration  [address]  [place] [address]  Web Address: http://www.samhsa.gov    Alcoholics Anonymous        Web Address: http://www.aa.org    https://www.cdc.gov/alcohol/fact-sheets/alcohol-use.htm     © Copyright [employer] 2018 Information is for End User's use only and may not be sold, redistributed or otherwise used for commercial purposes. All illustrations and images included in CareNotes® are the copyrighted property of [employer] or [employer]

## 2024-01-29 NOTE — PROGRESS NOTES
Assessment and Plan:     Problem List Items Addressed This Visit    None       Preventive health issues were discussed with patient, and age appropriate screening tests were ordered as noted in patient's After Visit Summary.  Personalized health advice and appropriate referrals for health education or preventive services given if needed, as noted in patient's After Visit Summary.     History of Present Illness:     Patient presents for a Medicare Wellness Visit    This is a very pleasant 69 years young gentleman who is here today for the regular follow-up and Medicare wellness visit Medicare wellness visit is up-to-date except for living will I discussed with him and he had to get a power of  and a living will set up for him.    Patient is here today for the follow-up.   Hypertension. I reviewed antihypertensive medication, patient does not have any side effects of  medications, no signs or symptoms of hypertension ,hypotension or orthostatic hypotension.  Patient is compliant with medications.  Blood workup related to hypertensive diagnosis reviewed.  Plan is to continue with the present management.  We will follow-up as a scheduled and adjust the doses of the medication as indicated.    Hypercholesterolemia lipid panel was excellent continue with the same dose of the medication.    Also esophageal reflux disease he is on omeprazole but now he takes it every other day I will recommend him to try as needed basis and do not take the medication if he does not need it.    Overall he is doing very well I will follow him up in about 6 months unless any problem he was not able to go for his coronary calcium score I will repeat the requisition and give it to him and hopefully he will be able to get it done       Patient Care Team:  Sudhir Rai MD as PCP - General  RODNEY Hammonds MD (Surgical Oncology)  Kevin Lisa Jr., MD (Dermatology)  Erum Knight MD (Oncology)      Review of Systems:     Review of Systems   Constitutional:  Negative for appetite change, fatigue and fever.   HENT:  Negative for congestion, ear pain, hearing loss, nosebleeds, sneezing, tinnitus and voice change.    Eyes:  Negative for pain, discharge and redness.   Respiratory:  Negative for cough, chest tightness and wheezing.    Cardiovascular:  Negative for chest pain, palpitations and leg swelling.   Gastrointestinal:  Negative for abdominal pain, blood in stool, constipation, diarrhea, nausea and vomiting.   Genitourinary:  Negative for difficulty urinating, dysuria, hematuria and urgency.   Musculoskeletal:  Negative for arthralgias, back pain, gait problem and joint swelling.   Skin:  Negative for rash and wound.   Allergic/Immunologic: Negative for environmental allergies.   Neurological:  Negative for dizziness, tremors, seizures, weakness, light-headedness and numbness.   Hematological:  Negative for adenopathy. Does not bruise/bleed easily.   Psychiatric/Behavioral:  Negative for behavioral problems and confusion. The patient is not nervous/anxious.         Problem List:     Patient Active Problem List   Diagnosis    HTN (hypertension)    Hypercholesterolemia    Numerous moles    GERD (gastroesophageal reflux disease)    BMI 28.0-28.9,adult    Malignant melanoma of torso excluding breast (HCC)    Encounter for follow-up surveillance of melanoma      Past Medical and Surgical History:     Past Medical History:   Diagnosis Date    Allergic     Allergic rhinitis     Resolved 1/14/2016     Allergy     Mild; spring and fall    Cancer (HCC)     melanoma    Colon polyp     Diverticulitis     Erectile dysfunction of nonorganic origin     Resolved 1/14/2016     GERD (gastroesophageal reflux disease)     Hyperlipidemia     Hypertension     Inflammatory bowel disease 11/42021    Diverticulitis    Rosacea     Resolved 1/14/2016      Past Surgical History:   Procedure Laterality Date    COLONOSCOPY       FACIAL/NECK BIOPSY Right 01/09/2018    Procedure: CHEEK and LOWER EYELID BCC EXCISION AND COMPLEX CLOSURE;  Surgeon: Anoop Jensen MD;  Location: [facility];  Service: Plastics    FL INJECTION LEFT HIP (NON ARTHROGRAM)  02/07/2022    LYMPH NODE BIOPSY Left 02/15/2022    Procedure: BIOPSY LYMPH NODE SENTINEL (NUC MED INJECTION AT 1300);  Surgeon: Alessia Magallanes MD;  Location[facility];  Service: Surgical Oncology    SKIN BIOPSY      SKIN LESION EXCISION Left 02/15/2022    Procedure: UPPER BACK LESION WIDE EXCISION;  Surgeon: Alessia Magallanes MD;  Location[facility];  Service: Surgical Oncology    WISDOM TOOTH EXTRACTION        Family History:     Family History   Problem Relation Age of Onset    Skin cancer Mother     No Known Problems Father       Social History:     Social History     Socioeconomic History    Marital status: /Civil Union     Spouse name: Not on file    Number of children: Not on file    Years of education: Not on file    Highest education level: Not on file   Occupational History    Not on file   Tobacco Use    Smoking status: Passive Smoke Exposure - Never Smoker    Smokeless tobacco: Never    Tobacco comments:     cigar 6 times a year   Vaping Use    Vaping status: Never Used   Substance and Sexual Activity    Alcohol use: Yes     Alcohol/week: 7.0 standard drinks of alcohol     Types: 6 Glasses of wine, 1 Shots of liquor per week     Comment: occasionally    Drug use: No    Sexual activity: Yes     Partners: Female   Other Topics Concern    Not on file   Social History Narrative    Former smoker - As per [employer]    Never used drugs - As per [employer]      Social Determinants of Health     Financial Resource Strain: Low Risk  (1/29/2024)    Overall Financial Resource Strain (CARDIA)     Difficulty of Paying Living Expenses: Not hard at all   Food Insecurity: Not on file   Transportation Needs: No Transportation Needs (1/29/2024)    PRAPARE - Transportation     Lack of  Transportation (Medical): No     Lack of Transportation (Non-Medical): No   Physical Activity: Not on file   Stress: Not on file   Social Connections: Not on file   Intimate Partner Violence: Not on file   Housing Stability: Not on file      Medications and Allergies:     Current Outpatient Medications   Medication Sig Dispense Refill    atorvastatin (LIPITOR) 20 mg tablet Take 1 tablet (20 mg total) by mouth daily 90 tablet 1    losartan (COZAAR) 100 MG tablet Take 1 tablet (100 mg total) by mouth daily 90 tablet 1    omeprazole (PriLOSEC) 40 MG capsule Take 40 mg by mouth daily Every other day      sildenafil (VIAGRA) 100 mg tablet Take 0.5 tablets (50 mg total) by mouth daily as needed for erectile dysfunction 30 tablet 1     No current facility-administered medications for this visit.     Allergies   Allergen Reactions    Neomycin-Polymyxin-Dexameth      Annotation - [id number]: skin peeled around eyes    Pollen Extract       Immunizations:     Immunization History   Administered Date(s) Administered    COVID-19 PFIZER VACCINE 0.3 ML IM 03/05/2021, 03/26/2021, 12/11/2021    INFLUENZA 11/11/2021    Influenza, high dose seasonal 0.7 mL 11/11/2021    Tdap 01/25/2019      Health Maintenance:         Topic Date Due    Colorectal Cancer Screening  01/12/2028    Hepatitis C Screening  Completed         Topic Date Due    Pneumococcal Vaccine: 65+ Years (1 - PCV) Never done    Influenza Vaccine (1) 09/01/2023    COVID-19 Vaccine (4 - 2023-24 season) 09/01/2023      Medicare Screening Tests and Risk Assessments:     Andrea is here for his Subsequent Wellness visit. Last Medicare Wellness visit information reviewed, patient interviewed and updates made to the record today.      Health Risk Assessment:   Patient rates overall health as very good. Patient feels that their physical health rating is same. Patient is satisfied with their life. Eyesight was rated as same. Hearing was rated as same. Patient feels that their  emotional and mental health rating is same. Patients states they are never, rarely angry. Patient states they are sometimes unusually tired/fatigued. Pain experienced in the last 7 days has been none. Patient states that he has experienced no weight loss or gain in last 6 months.     Depression Screening:   PHQ-2 Score: 0      Fall Risk Screening:   In the past year, patient has experienced: no history of falling in past year      Home Safety:  Patient does not have trouble with stairs inside or outside of their home. Patient has working smoke alarms and has working carbon monoxide detector. Home safety hazards include: none.     Nutrition:   Current diet is Regular.     Medications:   Patient is not currently taking any over-the-counter supplements. Patient is able to manage medications.     Activities of Daily Living (ADLs)/Instrumental Activities of Daily Living (IADLs):   Walk and transfer into and out of bed and chair?: Yes  Dress and groom yourself?: Yes    Bathe or shower yourself?: Yes    Feed yourself? Yes  Do your laundry/housekeeping?: Yes  Manage your money, pay your bills and track your expenses?: Yes  Make your own meals?: Yes    Do your own shopping?: Yes    Previous Hospitalizations:   Any hospitalizations or ED visits within the last 12 months?: No      Advance Care Planning:   Living will: No    Durable POA for healthcare: No    Advanced directive: No    Advanced directive counseling given: Yes      Cognitive Screening:   Provider or family/friend/caregiver concerned regarding cognition?: No    PREVENTIVE SCREENINGS      Cardiovascular Screening:    General: Screening Not Indicated and History Lipid Disorder      Diabetes Screening:     General: Screening Current      Colorectal Cancer Screening:     General: Screening Current      Prostate Cancer Screening:    General: Screening Current      Osteoporosis Screening:    General: Screening Not Indicated      Abdominal Aortic Aneurysm (AAA)  Screening:    Risk factors include: age between 65-76 yo        General: Screening Current      Lung Cancer Screening:     General: Screening Not Indicated      Hepatitis C Screening:    General: Screening Current    Screening, Brief Intervention, and Referral to Treatment (SBIRT)    Screening  Typical number of drinks in a day: 2  Typical number of drinks in a week: 10  Interpretation: Low risk drinking behavior.    AUDIT-C Screenin) How often did you have a drink containing alcohol in the past year? 2 to 3 times a week  2) How many drinks did you have on a typical day when you were drinking in the past year? 1 to 2  3) How often did you have 6 or more drinks on one occasion in the past year? less than monthly    AUDIT-C Score: 4  Interpretation: Score 4-12 (male): POSITIVE screen for alcohol misuse    AUDIT Screenin) How often during the last year have you found that you were not able to stop drinking once you had started? 0 - never  5) How often during the last year have you failed to do what was normally expected from you because of drinking? 0 - never  6) How often during the last year have you needed a first drink in the morning to get yourself going after a heavy drinking session? 0 - never  7) How often during the last year have you had a feeling of guilt or remorse after drinking? 0 - never  8) How often during the last year have you been unable to remember what happened the night before because you had been drinking? 1 - less than monthly  9) Have you or someone else been injured as a result of your drinking? 0 - no  10) Has a relative or friend or a doctor or another health worker been concerned about your drinking or suggested you cut down? 2 - yes, but not in the last year    AUDIT Score: 7  Interpretation: Low risk alcohol consumption    Single Item Drug Screening:  How often have you used an illegal drug (including marijuana) or a prescription medication for non-medical reasons in the past  year? never    Single Item Drug Screen Score: 0  Interpretation: Negative screen for possible drug use disorder    Other Counseling Topics:   Calcium and vitamin D intake and regular weightbearing exercise.     No results found.     Physical Exam:     Ht 6' (1.829 m)   Wt 96 kg (211 lb 9.6 oz)   BMI 28.70 kg/m²     Physical Exam  Vitals and nursing note reviewed.   Constitutional:       Appearance: Normal appearance. He is normal weight.   HENT:      Head: Normocephalic and atraumatic.      Right Ear: Tympanic membrane normal.      Left Ear: Tympanic membrane normal.      Nose: Nose normal.      Mouth/Throat:      Mouth: Mucous membranes are moist.   Eyes:      Extraocular Movements: Extraocular movements intact.      Pupils: Pupils are equal, round, and reactive to light.   Cardiovascular:      Rate and Rhythm: Normal rate and regular rhythm.      Pulses: Normal pulses.      Heart sounds: Normal heart sounds.   Pulmonary:      Effort: Pulmonary effort is normal.      Breath sounds: Normal breath sounds.   Abdominal:      General: Abdomen is flat. Bowel sounds are normal.      Palpations: Abdomen is soft.   Musculoskeletal:         General: No swelling, tenderness or deformity. Normal range of motion.      Cervical back: Normal range of motion and neck supple.   Skin:     General: Skin is warm.      Capillary Refill: Capillary refill takes 2 to 3 seconds.   Neurological:      General: No focal deficit present.      Mental Status: He is alert and oriented to person, place, and time. Mental status is at baseline.   Psychiatric:         Mood and Affect: Mood normal.         Behavior: Behavior normal.          Sudhir Rai MD

## 2024-02-13 ENCOUNTER — TELEPHONE (OUTPATIENT)
Dept: HEMATOLOGY ONCOLOGY | Facility: CLINIC | Age: 69
End: 2024-02-13

## 2024-02-13 NOTE — TELEPHONE ENCOUNTER
Appointment Change  Cancel, Reschedule, Change to Virtual      Who are you speaking with? Patient   If it is not the patient, is the caller listed on the communication consent form? N/A   Which provider is the appointment scheduled with? Dr. Magallanes   When was the original appointment scheduled?    Please list date and time 3/4/24 8:00 AM   At which location is the appointment scheduled to take place? [facility]   Was the appointment rescheduled?     Was the appointment changed from an in person visit to a virtual visit?    If so, please list the details of the change. 3/18/24 9:45 AM   What is the reason for the appointment change? Provider requested change due to scheduling conflict.        Was STAR transport scheduled? No   Does STAR transport need to be scheduled for the new visit (if applicable) No   Does the patient need an infusion appointment rescheduled? No   Does the patient have an upcoming infusion appointment scheduled? If so, when? No   Is the patient undergoing chemotherapy? No   For appointments cancelled with less than 24 hours:  Was the no-show policy reviewed? Yes

## 2024-03-04 ENCOUNTER — TELEPHONE (OUTPATIENT)
Dept: HEMATOLOGY ONCOLOGY | Facility: CLINIC | Age: 69
End: 2024-03-04

## 2024-03-04 NOTE — TELEPHONE ENCOUNTER
Appointment Change  Cancel, Reschedule, Change to Virtual      Who are you speaking with? Patient   If it is not the patient, is the caller listed on the communication consent form? N/A   Which provider is the appointment scheduled with? Dr. Magallanes   When was the original appointment scheduled?    Please list date and time 3/18/24 @ 945   At which location is the appointment scheduled to take place? [facility]   Was the appointment rescheduled?     Was the appointment changed from an in person visit to a virtual visit?    If so, please list the details of the change. 4/16/24 @ 709   What is the reason for the appointment change? Patient needed to move       Was STAR transport scheduled? No   Does STAR transport need to be scheduled for the new visit (if applicable) No   Does the patient need an infusion appointment rescheduled? No   Does the patient have an upcoming infusion appointment scheduled? If so, when? No   Is the patient undergoing chemotherapy? No   For appointments cancelled with less than 24 hours:  Was the no-show policy reviewed? N/A

## 2024-03-13 DIAGNOSIS — I10 ESSENTIAL HYPERTENSION: ICD-10-CM

## 2024-03-13 RX ORDER — LOSARTAN POTASSIUM 100 MG/1
100 TABLET ORAL DAILY
Qty: 90 TABLET | Refills: 1 | Status: SHIPPED | OUTPATIENT
Start: 2024-03-13 | End: 2024-03-15 | Stop reason: SDUPTHER

## 2024-03-15 DIAGNOSIS — I10 ESSENTIAL HYPERTENSION: ICD-10-CM

## 2024-03-15 RX ORDER — LOSARTAN POTASSIUM 100 MG/1
100 TABLET ORAL DAILY
Qty: 90 TABLET | Refills: 0 | Status: SHIPPED | OUTPATIENT
Start: 2024-03-15

## 2024-04-15 ENCOUNTER — TELEPHONE (OUTPATIENT)
Dept: SURGICAL ONCOLOGY | Facility: CLINIC | Age: 69
End: 2024-04-15

## 2024-04-16 ENCOUNTER — OFFICE VISIT (OUTPATIENT)
Dept: SURGICAL ONCOLOGY | Facility: CLINIC | Age: 69
End: 2024-04-16
Payer: MEDICARE

## 2024-04-16 VITALS
SYSTOLIC BLOOD PRESSURE: 126 MMHG | BODY MASS INDEX: 28.44 KG/M2 | HEIGHT: 72 IN | DIASTOLIC BLOOD PRESSURE: 74 MMHG | WEIGHT: 210 LBS | OXYGEN SATURATION: 98 % | TEMPERATURE: 97.1 F | HEART RATE: 85 BPM | RESPIRATION RATE: 18 BRPM

## 2024-04-16 DIAGNOSIS — Z08 ENCOUNTER FOR FOLLOW-UP SURVEILLANCE OF MELANOMA: Primary | ICD-10-CM

## 2024-04-16 DIAGNOSIS — Z85.820 PERSONAL HISTORY OF MALIGNANT MELANOMA: ICD-10-CM

## 2024-04-16 DIAGNOSIS — Z85.820 ENCOUNTER FOR FOLLOW-UP SURVEILLANCE OF MELANOMA: Primary | ICD-10-CM

## 2024-04-16 PROCEDURE — 99214 OFFICE O/P EST MOD 30 MIN: CPT | Performed by: STUDENT IN AN ORGANIZED HEALTH CARE EDUCATION/TRAINING PROGRAM

## 2024-04-16 NOTE — PROGRESS NOTES
Surgical Oncology Follow Up    [facility] [place]  [address] [facility] [address]  [phone]    Andrea Quintero  1955  [id number]    Diagnoses and all orders for this visit:    Encounter for follow-up surveillance of melanoma    Personal history of malignant melanoma        Chief Complaint   Patient presents with    Follow-up       No follow-ups on file.      Oncology History   Personal history of malignant melanoma   12/20/2021 Biopsy    Left upper back Melamona: shave biopsy  Thickness: 0.8mm  Ulceration: none   Mitoses: 0  Margins: Approaches both lateral margins and extends to the deep margin      12/20/2021 -  Cancer Staged    Staging form: Melanoma of the Skin, AJCC 8th Edition  - Clinical stage from 12/20/2021: Stage IB (cT1b, cN0, cM0) - Signed by Erum Knight MD on 1/27/2022 1/17/2022 Initial Diagnosis    Malignant melanoma of upper back (HCC)     2/15/2022 Surgery    A. Lymph node, sentinel, left neck #1:  Two lymph nodes, negative for metastatic melanoma (0/2).    B. Lymph node, sentinel, left neck #2:  Two lymph nodes, negative for metastatic melanoma (0/2).    C. Skin and soft tissue, upper left back, wide excision: Scar.         Staging: T1bNx    Treatment history: s/p WLE and insufficient SLN bx 2/15/22  Current treatment: obs  Disease status:     History of Present Illness:  Presents in f/u after wide local excision and sentinel lymph node biopsy for a final path T1b melanoma of the back.  The sentinel lymph node mapping in the operating room was insufficient, with no dominant site of elevated Johnny counts or blue dye. US @ 6 mo and @ 12 mo with no abnormal nodes. Most recent US 1/2024 also WNL and pt reports no new lumps or bumps. No systemic sx of HA, bone pain, dizziness, fatigue, weight loss. Sees Maria L regularly; had a BCC removed in March of 2023 and again in March of this year he had a BCC of left  temple.     Review of Systems  Complete ROS Surg Onc:   Constitutional: The patient denies new or recent history of general fatigue, no recent weight loss, no change in appetite.   Eyes: No complaints of visual problems, no scleral icterus.   ENT: no complaints of ear pain, no hoarseness, no difficulty swallowing,  no tinnitus and no new masses in head, oral cavity, or neck.   Cardiovascular: No complaints of chest pain, no palpitations, no ankle edema.   Respiratory: No complaints of shortness of breath, no cough.   Gastrointestinal: No complaints of jaundice, no bloody stools, no pale stools.   Genitourinary: No complaints of dysuria, no hematuria, no nocturia, no frequent urination, no urethral discharge.   Musculoskeletal: No complaints of weakness, paralysis, joint stiffness or arthralgias.  Integumentary: No complaints of rash, no new lesions.   Neurological: No complaints of convulsions, no seizures, no dizziness.   Hematologic/Lymphatic: No complaints of easy bruising.   Endocrine:  No hot or cold intolerance.  No polydipsia, polyphagia, or polyuria.  Allergy/immunology:  No environmental allergies.  No food allergies.  Not immunocompromised.  Skin:  No pallor or rash.  No wound.        Patient Active Problem List   Diagnosis    HTN (hypertension)    Hypercholesterolemia    Numerous moles    GERD (gastroesophageal reflux disease)    BMI 28.0-28.9,adult    Personal history of malignant melanoma    Encounter for follow-up surveillance of melanoma     Past Medical History:   Diagnosis Date    Allergic     Allergic rhinitis     Resolved 1/14/2016     Allergy     Mild; spring and fall    Cancer (HCC)     melanoma    Colon polyp     Diverticulitis     Erectile dysfunction of nonorganic origin     Resolved 1/14/2016     GERD (gastroesophageal reflux disease)     Hyperlipidemia     Hypertension     Inflammatory bowel disease 11/42021    Diverticulitis    Rosacea     Resolved 1/14/2016      Past Surgical History:    Procedure Laterality Date    COLONOSCOPY      FACIAL/NECK BIOPSY Right 01/09/2018    Procedure: CHEEK and LOWER EYELID BCC EXCISION AND COMPLEX CLOSURE;  Surgeon: Anoop Jensen MD;  Location: [facility];  Service: Plastics    FL INJECTION LEFT HIP (NON ARTHROGRAM)  02/07/2022    LYMPH NODE BIOPSY Left 02/15/2022    Procedure: BIOPSY LYMPH NODE SENTINEL (NUC MED INJECTION AT 1300);  Surgeon: Alessia Magallanes MD;  Location: [facility];  Service: Surgical Oncology    SKIN BIOPSY      SKIN LESION EXCISION Left 02/15/2022    Procedure: UPPER BACK LESION WIDE EXCISION;  Surgeon: Alessia Magallanes MD;  Location: [facility];  Service: Surgical Oncology    WISDOM TOOTH EXTRACTION       Family History   Problem Relation Age of Onset    Skin cancer Mother     No Known Problems Father      Social History     Socioeconomic History    Marital status: /Civil Union     Spouse name: Not on file    Number of children: Not on file    Years of education: Not on file    Highest education level: Not on file   Occupational History    Not on file   Tobacco Use    Smoking status: Never     Passive exposure: Yes    Smokeless tobacco: Never    Tobacco comments:     cigar 6 times a year   Vaping Use    Vaping status: Never Used   Substance and Sexual Activity    Alcohol use: Yes     Alcohol/week: 7.0 standard drinks of alcohol     Types: 6 Glasses of wine, 1 Shots of liquor per week     Comment: occasionally    Drug use: No    Sexual activity: Yes     Partners: Female   Other Topics Concern    Not on file   Social History Narrative    Former smoker - As per [employer]    Never used drugs - As per [employer]      Social Determinants of Health     Financial Resource Strain: Low Risk  (1/29/2024)    Overall Financial Resource Strain (CARDIA)     Difficulty of Paying Living Expenses: Not hard at all   Food Insecurity: Not on file   Transportation Needs: No Transportation Needs (1/29/2024)    PRAPARE - Transportation     Lack of  Transportation (Medical): No     Lack of Transportation (Non-Medical): No   Physical Activity: Not on file   Stress: Not on file   Social Connections: Not on file   Intimate Partner Violence: Not on file   Housing Stability: Not on file       Current Outpatient Medications:     atorvastatin (LIPITOR) 20 mg tablet, Take 1 tablet (20 mg total) by mouth daily, Disp: 90 tablet, Rfl: 1    clobetasol propionate (OLUX-E) 0.05 % topical foam, APPLY TO AFFECTED AREA OF SKIN UP TO TWICE DAILY FOR TWO WEEKS ON, AND ONE WEEK OFF, THEN AS NEEDED WITH FLARES, Disp: , Rfl:     losartan (COZAAR) 100 MG tablet, Take 1 tablet (100 mg total) by mouth daily, Disp: 90 tablet, Rfl: 0    omeprazole (PriLOSEC) 40 MG capsule, Take 40 mg by mouth daily Every other day, Disp: , Rfl:     sildenafil (VIAGRA) 100 mg tablet, Take 0.5 tablets (50 mg total) by mouth daily as needed for erectile dysfunction, Disp: 30 tablet, Rfl: 1  Allergies   Allergen Reactions    Neomycin-Polymyxin-Dexameth      Annotation - [id number]: skin peeled around eyes    Pollen Extract      Vitals:    04/16/24 1541   BP: 126/74   Pulse: 85   Resp: 18   Temp: (!) 97.1 °F (36.2 °C)   SpO2: 98%       Physical Exam  Constitutional: Patient is well-developed and well-nourished who appears the stated age in no acute distress. Patient is pleasant and talkative.     HEENT:  Normocephalic.  Sclerae are anicteric. Mucous membranes are moist. Neck is supple without adenopathy. Incision well-healed, No JVD.     Chest: Equal chest rise, easy WOB  Cardiac: Heart is regular rate.     Abdomen: Abdomen is non-tender, non-distended and without masses.     Extremities: There is no clubbing or cyanosis. There is no edema.  Symmetric.  Neuro: Grossly nonfocal. Gait is normal.     Lymphatic: No evidence of cervical adenopathy bilaterally. No evidence of axillary adenopathy bilaterally. No evidence of inguinal adenopathy bilaterally.     Skin: Warm, anicteric.  Incision flat with no  abnormality  Psych:  Patient is pleasant and talkative.    Pathology:  As above    Labs:  Reviewed in [place] personally    Imaging  NM lymphatic melanoma    Addendum Date: 2/15/2022 Addendum:   ADDENDUM: Please note the dictation error documenting the site at left upper back. 0.48 mCi Tc-99m sulfur colloid (0.6 cc volume) was administered intradermally in divided doses in the region of the patients left upper back melanoma.      Result Date: 2/15/2022  Narrative: SENTINEL NODE LYMPHOSCINTIGRAPHY INDICATION:   Left upper back melanoma. FINDINGS: 0.48 mCi Tc-99m sulfur colloid (0.6 cc volume) was administered intradermally in divided doses in the region of the patients left upper pelvic melanoma.  Scintigraphic images were obtained over the chest and neck. Initially 2 foci of intense tracer uptake identified in the left neck base followed by a  slightly inferior tracer uptake.  Using scintigraphic guidance, the corresponding skin sites were marked with an indelible marker.  The patient was transferred to the operating room in satisfactory condition.     Impression: 1.  [place] lymph node localized to left neck base to at least 3 foci of tracer uptake.. Workstation performed: [id number]     FL injection left hip (non arthrogram)    Result Date: 2/7/2022  Narrative: LEFT HIP INJECTION INDICATION:  Patient presents with prescription for left hip steroid injection. COMPARISON:  Plain films hips and pelvis left 1/8/2022. IMAGES:  3 FLUOROSCOPY TIME:  0.1 MFT. FINDINGS: After the risks and benefits of the procedure were thoroughly explained, informed consent was obtained. The patient was prepped and draped in the usual sterile fashion. 1% lidocaine solution was utilized for local anesthesia.  Intermittent fluoroscopy was utilized for placement a 20 gauge  3.5 inch spinal needle within the left hip joint.  After positioning was confirmed with 3 mL of Omnipaque 300, a solution comprised of 1 mL 80 mg/mL Depomedrol, 2 mL of  0.25% Sensorcaine and 2 mL 1% Xylocaine. was injected into the left hip joint.  The patient tolerated the procedure well.  There were no complications.     Impression: Post injection patient does admit to mid improvement of his typical left hip pain. Technically successful left hip steroid injection. Procedure was performed by ELZA Biggs, RODNEY under the direct supervision of Dr. Grande. Workstation performed: [id number]     US 6/2023  IMPRESSION:     Normal-appearing left lateral neck lymph nodes.  No abnormal lymph nodes seen.    I reviewed and intrepreted the laboratory and imaging data incl present and past iamging, derm notes, path, med onc notes, US of H&N    Discussion/Summary:   T1 B melanoma of the back excised 2/2022, with an insufficient sentinel lymph node procedure. Recent neck US 1/2024 with no abnormalities. Doing well and sees vania regularly. Will RTC in 6 mo with repeat US and for H&P

## 2024-07-17 ENCOUNTER — APPOINTMENT (OUTPATIENT)
Dept: LAB | Age: 69
End: 2024-07-17
Payer: MEDICARE

## 2024-07-17 DIAGNOSIS — Z85.820 ENCOUNTER FOR FOLLOW-UP SURVEILLANCE OF MELANOMA: ICD-10-CM

## 2024-07-17 DIAGNOSIS — C43.59 MALIGNANT MELANOMA OF TORSO EXCLUDING BREAST (HCC): ICD-10-CM

## 2024-07-17 DIAGNOSIS — Z08 ENCOUNTER FOR FOLLOW-UP SURVEILLANCE OF MELANOMA: ICD-10-CM

## 2024-07-17 LAB
ALBUMIN SERPL BCG-MCNC: 4.1 G/DL (ref 3.5–5)
ALP SERPL-CCNC: 55 U/L (ref 34–104)
ALT SERPL W P-5'-P-CCNC: 20 U/L (ref 7–52)
ANION GAP SERPL CALCULATED.3IONS-SCNC: 12 MMOL/L (ref 4–13)
AST SERPL W P-5'-P-CCNC: 18 U/L (ref 13–39)
BASOPHILS # BLD AUTO: 0.04 THOUSANDS/ÂΜL (ref 0–0.1)
BASOPHILS NFR BLD AUTO: 1 % (ref 0–1)
BILIRUB SERPL-MCNC: 0.85 MG/DL (ref 0.2–1)
BUN SERPL-MCNC: 18 MG/DL (ref 5–25)
CALCIUM SERPL-MCNC: 9.1 MG/DL (ref 8.4–10.2)
CHLORIDE SERPL-SCNC: 105 MMOL/L (ref 96–108)
CO2 SERPL-SCNC: 22 MMOL/L (ref 21–32)
CREAT SERPL-MCNC: 1.05 MG/DL (ref 0.6–1.3)
EOSINOPHIL # BLD AUTO: 0.08 THOUSAND/ÂΜL (ref 0–0.61)
EOSINOPHIL NFR BLD AUTO: 2 % (ref 0–6)
ERYTHROCYTE [DISTWIDTH] IN BLOOD BY AUTOMATED COUNT: 12.7 % (ref 11.6–15.1)
GFR SERPL CREATININE-BSD FRML MDRD: 72 ML/MIN/1.73SQ M
GLUCOSE P FAST SERPL-MCNC: 93 MG/DL (ref 65–99)
HCT VFR BLD AUTO: 46.2 % (ref 36.5–49.3)
HGB BLD-MCNC: 15.6 G/DL (ref 12–17)
IMM GRANULOCYTES # BLD AUTO: 0.01 THOUSAND/UL (ref 0–0.2)
IMM GRANULOCYTES NFR BLD AUTO: 0 % (ref 0–2)
LDH SERPL-CCNC: 162 U/L (ref 140–271)
LYMPHOCYTES # BLD AUTO: 1.41 THOUSANDS/ÂΜL (ref 0.6–4.47)
LYMPHOCYTES NFR BLD AUTO: 28 % (ref 14–44)
MCH RBC QN AUTO: 31.8 PG (ref 26.8–34.3)
MCHC RBC AUTO-ENTMCNC: 33.8 G/DL (ref 31.4–37.4)
MCV RBC AUTO: 94 FL (ref 82–98)
MONOCYTES # BLD AUTO: 0.63 THOUSAND/ÂΜL (ref 0.17–1.22)
MONOCYTES NFR BLD AUTO: 13 % (ref 4–12)
NEUTROPHILS # BLD AUTO: 2.88 THOUSANDS/ÂΜL (ref 1.85–7.62)
NEUTS SEG NFR BLD AUTO: 56 % (ref 43–75)
NRBC BLD AUTO-RTO: 0 /100 WBCS
PLATELET # BLD AUTO: 195 THOUSANDS/UL (ref 149–390)
PMV BLD AUTO: 11.4 FL (ref 8.9–12.7)
POTASSIUM SERPL-SCNC: 3.9 MMOL/L (ref 3.5–5.3)
PROT SERPL-MCNC: 6.8 G/DL (ref 6.4–8.4)
RBC # BLD AUTO: 4.91 MILLION/UL (ref 3.88–5.62)
SODIUM SERPL-SCNC: 139 MMOL/L (ref 135–147)
WBC # BLD AUTO: 5.05 THOUSAND/UL (ref 4.31–10.16)

## 2024-07-17 PROCEDURE — 85025 COMPLETE CBC W/AUTO DIFF WBC: CPT

## 2024-07-17 PROCEDURE — 80053 COMPREHEN METABOLIC PANEL: CPT

## 2024-07-17 PROCEDURE — 36415 COLL VENOUS BLD VENIPUNCTURE: CPT

## 2024-07-17 PROCEDURE — 83615 LACTATE (LD) (LDH) ENZYME: CPT

## 2024-07-19 NOTE — TELEPHONE ENCOUNTER
LM for pt offering earlier appt time tomorrow at 2pm opening. Instructed to call back if able to come earlier.    No

## 2024-07-25 ENCOUNTER — OFFICE VISIT (OUTPATIENT)
Dept: HEMATOLOGY ONCOLOGY | Facility: CLINIC | Age: 69
End: 2024-07-25
Payer: MEDICARE

## 2024-07-25 ENCOUNTER — RA CDI HCC (OUTPATIENT)
Dept: OTHER | Facility: HOSPITAL | Age: 69
End: 2024-07-25

## 2024-07-25 VITALS
HEART RATE: 83 BPM | HEIGHT: 72 IN | WEIGHT: 212 LBS | TEMPERATURE: 97.9 F | SYSTOLIC BLOOD PRESSURE: 122 MMHG | OXYGEN SATURATION: 99 % | DIASTOLIC BLOOD PRESSURE: 84 MMHG | RESPIRATION RATE: 18 BRPM | BODY MASS INDEX: 28.71 KG/M2

## 2024-07-25 DIAGNOSIS — Z85.820 ENCOUNTER FOR FOLLOW-UP SURVEILLANCE OF MELANOMA: Primary | ICD-10-CM

## 2024-07-25 DIAGNOSIS — C43.59 MALIGNANT MELANOMA OF UPPER BACK (HCC): ICD-10-CM

## 2024-07-25 DIAGNOSIS — C43.59 MALIGNANT MELANOMA OF TORSO EXCLUDING BREAST (HCC): ICD-10-CM

## 2024-07-25 DIAGNOSIS — Z08 ENCOUNTER FOR FOLLOW-UP SURVEILLANCE OF MELANOMA: Primary | ICD-10-CM

## 2024-07-25 PROCEDURE — 99213 OFFICE O/P EST LOW 20 MIN: CPT | Performed by: INTERNAL MEDICINE

## 2024-07-25 PROCEDURE — G2211 COMPLEX E/M VISIT ADD ON: HCPCS | Performed by: INTERNAL MEDICINE

## 2024-07-25 NOTE — PROGRESS NOTES
[facility] [place]  [address]  [place] [address]  [phone]  [phone]     Date of Visit: 7/25/2024  Name: Andrea Quintero   YOB: 1955        Subjective    VISIT DIAGNOSIS:  There are no diagnoses linked to this encounter.      Oncology History   Personal history of malignant melanoma   12/20/2021 Biopsy    Left upper back Melamona: shave biopsy  Thickness: 0.8mm  Ulceration: none   Mitoses: 0  Margins: Approaches both lateral margins and extends to the deep margin      12/20/2021 -  Cancer Staged    Staging form: Melanoma of the Skin, AJCC 8th Edition  - Clinical stage from 12/20/2021: Stage IB (cT1b, cN0, cM0) - Signed by Erum Knight MD on 1/27/2022 1/17/2022 Initial Diagnosis    Malignant melanoma of upper back (HCC)     2/15/2022 Surgery    A. Lymph node, sentinel, left neck #1:  Two lymph nodes, negative for metastatic melanoma (0/2).    B. Lymph node, sentinel, left neck #2:  Two lymph nodes, negative for metastatic melanoma (0/2).    C. Skin and soft tissue, upper left back, wide excision: Scar.        Cancer Staging   Malignant melanoma of torso excluding breast (HCC)  Staging form: Melanoma of the Skin, AJCC 8th Edition  - Clinical stage from 12/20/2021: Stage IB (cT1b, cN0, cM0) - Signed by Erum Knight MD on 1/27/2022          HISTORY OF PRESENT ILLNESS: Andrea Quintero is a 69 y.o. male  who presents for follow-up of malignant melanoma of torso excluding breasts.    Eliud valdes, he is doing well. He has no concern about anything on his skin. He is scheduled for another Moh's treatment here on 02/2024. He denies any existence of any lumps or bumps. He was last seen on 01/2022.  He had undergone ultrasound on the lymph nodes in radiology today. He had the same on 3/2023. Dr. Magallanes does it every 3 months to every 6 months. He states his appetite is good. He last visited his dermatologist on 12/2023. He had his blood work done today and  it came out good.    He describes feeling tired frequently during midafternoon even while sitting at his office mostly during winter. He denies feeling tired during summers. He does not sleep well at night. He will visit his primary care doctor on Monday. He denies doing exercise regularly due to cold, but he does it in summer.        Review of Systems   Constitutional:  Positive for fatigue. Negative for appetite change, fever and unexpected weight change.   HENT:   Negative for lump/mass, trouble swallowing and voice change.    Eyes:  Negative for icterus.   Respiratory:  Negative for cough, shortness of breath and wheezing.    Cardiovascular:  Negative for leg swelling.   Gastrointestinal:  Negative for abdominal distention, abdominal pain, blood in stool, constipation, diarrhea, nausea and vomiting.   Genitourinary:  Negative for difficulty urinating and hematuria.    Musculoskeletal:  Negative for arthralgias, gait problem and myalgias.   Skin:  Negative for itching and rash.        No new, changing, or concerning lesions.   Neurological:  Negative for extremity weakness, gait problem, headaches, light-headedness and numbness.   Hematological:  Negative for adenopathy.        MEDICATIONS:    Current Outpatient Medications:     atorvastatin (LIPITOR) 20 mg tablet, Take 1 tablet (20 mg total) by mouth daily, Disp: 90 tablet, Rfl: 1    clobetasol propionate (OLUX-E) 0.05 % topical foam, APPLY TO AFFECTED AREA OF SKIN UP TO TWICE DAILY FOR TWO WEEKS ON, AND ONE WEEK OFF, THEN AS NEEDED WITH FLARES, Disp: , Rfl:     losartan (COZAAR) 100 MG tablet, Take 1 tablet (100 mg total) by mouth daily, Disp: 90 tablet, Rfl: 0    omeprazole (PriLOSEC) 40 MG capsule, Take 40 mg by mouth daily Every other day, Disp: , Rfl:     sildenafil (VIAGRA) 100 mg tablet, Take 0.5 tablets (50 mg total) by mouth daily as needed for erectile dysfunction, Disp: 30 tablet, Rfl: 1     ALLERGIES:  Allergies   Allergen Reactions     Neomycin-Polymyxin-Dexameth      Annotation - [id number]: skin peeled around eyes    Pollen Extract         ACTIVE PROBLEMS:  Patient Active Problem List   Diagnosis    HTN (hypertension)    Hypercholesterolemia    Numerous moles    GERD (gastroesophageal reflux disease)    BMI 28.0-28.9,adult    Personal history of malignant melanoma    Encounter for follow-up surveillance of melanoma          PAST MEDICAL HISTORY:   Past Medical History:   Diagnosis Date    Allergic     Allergic rhinitis     Resolved 1/14/2016     Allergy     Mild; spring and fall    Cancer (HCC)     melanoma    Colon polyp     Diverticulitis     Erectile dysfunction of nonorganic origin     Resolved 1/14/2016     GERD (gastroesophageal reflux disease)     Hyperlipidemia     Hypertension     Inflammatory bowel disease 11/42021    Diverticulitis    Rosacea     Resolved 1/14/2016         PAST SURGICAL HISTORY:  Past Surgical History:   Procedure Laterality Date    COLONOSCOPY      FACIAL/NECK BIOPSY Right 01/09/2018    Procedure: CHEEK and LOWER EYELID BCC EXCISION AND COMPLEX CLOSURE;  Surgeon: Anoop Jensen MD;  Location: [facility];  Service: Plastics    FL INJECTION LEFT HIP (NON ARTHROGRAM)  02/07/2022    LYMPH NODE BIOPSY Left 02/15/2022    Procedure: BIOPSY LYMPH NODE SENTINEL (NUC MED INJECTION AT 1300);  Surgeon: Alessia Magallanes MD;  Location: [facility];  Service: Surgical Oncology    SKIN BIOPSY      SKIN LESION EXCISION Left 02/15/2022    Procedure: UPPER BACK LESION WIDE EXCISION;  Surgeon: Alessia Magallanes MD;  Location: [facility];  Service: Surgical Oncology    WISDOM TOOTH EXTRACTION          SOCIAL HISTORY:  Social History     Socioeconomic History    Marital status: /Civil Union     Spouse name: Not on file    Number of children: Not on file    Years of education: Not on file    Highest education level: Not on file   Occupational History    Not on file   Tobacco Use    Smoking status: Never     Passive exposure:  Yes    Smokeless tobacco: Never    Tobacco comments:     cigar 6 times a year   Vaping Use    Vaping status: Never Used   Substance and Sexual Activity    Alcohol use: Yes     Alcohol/week: 7.0 standard drinks of alcohol     Types: 6 Glasses of wine, 1 Shots of liquor per week     Comment: occasionally    Drug use: No    Sexual activity: Yes     Partners: Female   Other Topics Concern    Not on file   Social History Narrative    Former smoker - As per [employer]    Never used drugs - As per [employer]      Social Determinants of Health     Financial Resource Strain: Low Risk  (1/29/2024)    Overall Financial Resource Strain (CARDIA)     Difficulty of Paying Living Expenses: Not hard at all   Food Insecurity: Not on file   Transportation Needs: No Transportation Needs (1/29/2024)    PRAPARE - Transportation     Lack of Transportation (Medical): No     Lack of Transportation (Non-Medical): No   Physical Activity: Not on file   Stress: Not on file   Social Connections: Not on file   Intimate Partner Violence: Not on file   Housing Stability: Not on file        FAMILY HISTORY:  Family History   Problem Relation Age of Onset    Skin cancer Mother     No Known Problems Father            Objective    PHYSICAL EXAMINATION:   Blood pressure 122/84, pulse 83, temperature 97.9 °F (36.6 °C), temperature source Temporal, resp. rate 18, height 6' (1.829 m), weight 96.2 kg (212 lb), SpO2 99%.     Pain Score: 0-No pain            Physical Exam  Constitutional:       General: He is not in acute distress.     Appearance: Normal appearance. He is not ill-appearing or toxic-appearing.   HENT:      Head: Normocephalic and atraumatic.      Right Ear: External ear normal.      Left Ear: External ear normal.      Nose: Nose normal.      Mouth/Throat:      Mouth: Mucous membranes are moist.      Pharynx: Oropharynx is clear.   Eyes:      General: No scleral icterus.        Right eye: No discharge.         Left eye: No discharge.       Conjunctiva/sclera: Conjunctivae normal.   Cardiovascular:      Rate and Rhythm: Normal rate and regular rhythm.      Pulses: Normal pulses.      Heart sounds: No murmur heard.     No friction rub. No gallop.   Pulmonary:      Effort: Pulmonary effort is normal. No respiratory distress.      Breath sounds: Normal breath sounds. No wheezing or rales.   Abdominal:      General: Bowel sounds are normal. There is no distension.      Palpations: There is no mass.      Tenderness: There is no abdominal tenderness. There is no rebound.   Musculoskeletal:         General: No swelling or tenderness.      Cervical back: Normal range of motion. No rigidity.      Right lower leg: No edema.      Left lower leg: No edema.   Lymphadenopathy:      Head:      Right side of head: No submandibular, preauricular or posterior auricular adenopathy.      Left side of head: No submandibular, preauricular or posterior auricular adenopathy.      Cervical: No cervical adenopathy.      Right cervical: No superficial or posterior cervical adenopathy.     Left cervical: No superficial or posterior cervical adenopathy.      Upper Body:      Right upper body: No supraclavicular or axillary adenopathy.      Left upper body: No supraclavicular or axillary adenopathy.      Lower Body: No right inguinal adenopathy. No left inguinal adenopathy.   Skin:     General: Skin is warm.      Coloration: Skin is not jaundiced.      Findings: No lesion or rash.      Comments: Well healed surgical scar.  No evidence of recurrence at primary site.   Neurological:      General: No focal deficit present.      Mental Status: He is alert and oriented to person, place, and time.      Cranial Nerves: No cranial nerve deficit.      Motor: No weakness.      Gait: Gait normal.   Psychiatric:         Mood and Affect: Mood normal.         Behavior: Behavior normal.         Thought Content: Thought content normal.         Judgment: Judgment normal.         I reviewed lab data  in the chart.    Lab report reviewed with patient.    WBC   Date Value Ref Range Status   07/17/2024 5.05 4.31 - 10.16 Thousand/uL Final   01/10/2024 5.24 4.31 - 10.16 Thousand/uL Final   06/15/2023 5.21 4.31 - 10.16 Thousand/uL Final     Hemoglobin   Date Value Ref Range Status   07/17/2024 15.6 12.0 - 17.0 g/dL Final   01/10/2024 15.7 12.0 - 17.0 g/dL Final   06/15/2023 15.0 12.0 - 17.0 g/dL Final     Platelets   Date Value Ref Range Status   07/17/2024 195 149 - 390 Thousands/uL Final   01/10/2024 211 149 - 390 Thousands/uL Final   06/15/2023 188 149 - 390 Thousands/uL Final     MCV   Date Value Ref Range Status   07/17/2024 94 82 - 98 fL Final   01/10/2024 96 82 - 98 fL Final   06/15/2023 95 82 - 98 fL Final      Potassium   Date Value Ref Range Status   07/17/2024 3.9 3.5 - 5.3 mmol/L Final   01/10/2024 4.0 3.5 - 5.3 mmol/L Final   06/15/2023 4.1 3.5 - 5.3 mmol/L Final     Chloride   Date Value Ref Range Status   07/17/2024 105 96 - 108 mmol/L Final   01/10/2024 106 96 - 108 mmol/L Final   06/15/2023 110 (H) 96 - 108 mmol/L Final     CO2   Date Value Ref Range Status   07/17/2024 22 21 - 32 mmol/L Final   01/10/2024 27 21 - 32 mmol/L Final   06/15/2023 23 21 - 32 mmol/L Final     BUN   Date Value Ref Range Status   07/17/2024 18 5 - 25 mg/dL Final   01/10/2024 18 5 - 25 mg/dL Final   06/15/2023 19 5 - 25 mg/dL Final     Creatinine   Date Value Ref Range Status   07/17/2024 1.05 0.60 - 1.30 mg/dL Final     Comment:     Standardized to IDMS reference method   01/10/2024 1.21 0.60 - 1.30 mg/dL Final     Comment:     Standardized to IDMS reference method   06/15/2023 1.21 0.60 - 1.30 mg/dL Final     Comment:     Standardized to IDMS reference method     Glucose   Date Value Ref Range Status   11/04/2021 94 65 - 140 mg/dL Final     Comment:     If the patient is fasting, the ADA then defines impaired fasting glucose as > 100 mg/dL and diabetes as > or equal to 123 mg/dL.  Specimen collection should occur prior to  Sulfasalazine administration due to the potential for falsely depressed results. Specimen collection should occur prior to Sulfapyridine administration due to the potential for falsely elevated results.     Calcium   Date Value Ref Range Status   07/17/2024 9.1 8.4 - 10.2 mg/dL Final   01/10/2024 9.5 8.4 - 10.2 mg/dL Final   06/15/2023 9.3 8.3 - 10.1 mg/dL Final     Albumin   Date Value Ref Range Status   07/17/2024 4.1 3.5 - 5.0 g/dL Final   01/10/2024 4.2 3.5 - 5.0 g/dL Final   06/15/2023 3.8 3.5 - 5.0 g/dL Final     Total Bilirubin   Date Value Ref Range Status   07/17/2024 0.85 0.20 - 1.00 mg/dL Final     Comment:     Use of this assay is not recommended for patients undergoing treatment with eltrombopag due to the potential for falsely elevated results.  N-acetyl-p-benzoquinone imine (metabolite of Acetaminophen) will generate erroneously low results in samples for patients that have taken an overdose of Acetaminophen.   01/10/2024 0.96 0.20 - 1.00 mg/dL Final     Comment:     Use of this assay is not recommended for patients undergoing treatment with eltrombopag due to the potential for falsely elevated results.  N-acetyl-p-benzoquinone imine (metabolite of Acetaminophen) will generate erroneously low results in samples for patients that have taken an overdose of Acetaminophen.   06/15/2023 0.94 0.20 - 1.00 mg/dL Final     Comment:     Use of this assay is not recommended for patients undergoing treatment with eltrombopag due to the potential for falsely elevated results.     Alkaline Phosphatase   Date Value Ref Range Status   07/17/2024 55 34 - 104 U/L Final   01/10/2024 50 34 - 104 U/L Final   06/15/2023 57 46 - 116 U/L Final     AST   Date Value Ref Range Status   07/17/2024 18 13 - 39 U/L Final   01/10/2024 18 13 - 39 U/L Final   06/15/2023 16 5 - 45 U/L Final     Comment:     Specimen collection should occur prior to Sulfasalazine administration due to the potential for falsely depressed results.   "    ALT   Date Value Ref Range Status   07/17/2024 20 7 - 52 U/L Final     Comment:     Specimen collection should occur prior to Sulfasalazine administration due to the potential for falsely depressed results.    01/10/2024 20 7 - 52 U/L Final     Comment:     Specimen collection should occur prior to Sulfasalazine administration due to the potential for falsely depressed results.    06/15/2023 29 12 - 78 U/L Final     Comment:     Specimen collection should occur prior to Sulfasalazine and/or Sulfapyridine administration due to the potential for falsely depressed results.       LD   Date Value Ref Range Status   07/17/2024 162 140 - 271 U/L Final   01/10/2024 168 140 - 271 U/L Final   06/15/2023 197 81 - 234 U/L Final     No results found for: \"TSH\"  No results found for: \"O3XLPMO\"   No results found for: \"FREET4\"      RECENT IMAGING:  No results found.          Assessment/Plan  1. Malignant melanoma of torso excluding breast (HCC)  -     Cbc, cmp. Ld levels     2. Encounter for follow-up surveillance of melanoma  Assessment & Plan:  The patient is a 69-year-old male with stage 1b melanoma here for continued monitoring, follow-up in surveillance. He is doing well and has no clinical evidence of melanoma recurrence or metastasis. Labs reviewed and okay. All values are within normal limits. He continues to follow with dermatology as well as surgical oncology. He does have periodic imaging with ultrasounds of his head and neck lymph nodes which have been negative in the past. He had one earlier today with results pending. . We will continue to follow him every 6 months at this time. He will return for skin check and blood work. Orders placed and scripts provided. He knows to call with issues or concerns prior to his next visit.    CBC, cmp and LD level check  F/u after 6 months    Erum Knight MD, PhD    Transcribed for Erum Knight MD, by BISMITA BEHERA on 07/25/24 at 9:49 AM. Powered by Dragon [employer]ient " [employer].

## 2024-07-25 NOTE — LETTER
July 31, 2024     Alessia Magallanes MD  [address] [facility] [address]  [facility] [address]    Patient: Andrea Quitnero   YOB: 1955   Date of Visit: 7/25/2024       Dear Dr. Magallanes:    Thank you for referring Andrea Quintero to me for evaluation. Below are my notes for this consultation.    If you have questions, please do not hesitate to call me. I look forward to following your patient along with you.         Sincerely,        Erum Knight MD        CC: MD Hodan Quiroz PA-C Joseph J Zaladonis Jr., MD Dhruvkumar Gadhiya, MD  7/31/2024  8:25 AM  Attested   [facility] [address] [address]  [phone]  [phone]     Date of Visit: 7/25/2024  Name: Andrea Quintero   YOB: 1955        Subjective    VISIT DIAGNOSIS:  There are no diagnoses linked to this encounter.      Oncology History   Personal history of malignant melanoma   12/20/2021 Biopsy    Left upper back Melamona: shave biopsy  Thickness: 0.8mm  Ulceration: none   Mitoses: 0  Margins: Approaches both lateral margins and extends to the deep margin      12/20/2021 -  Cancer Staged    Staging form: Melanoma of the Skin, AJCC 8th Edition  - Clinical stage from 12/20/2021: Stage IB (cT1b, cN0, cM0) - Signed by Erum Knight MD on 1/27/2022 1/17/2022 Initial Diagnosis    Malignant melanoma of upper back (HCC)     2/15/2022 Surgery    A. Lymph node, sentinel, left neck #1:  Two lymph nodes, negative for metastatic melanoma (0/2).    B. Lymph node, sentinel, left neck #2:  Two lymph nodes, negative for metastatic melanoma (0/2).    C. Skin and soft tissue, upper left back, wide excision: Scar.        Cancer Staging   Malignant melanoma of torso excluding breast (HCC)  Staging form: Melanoma of the Skin, AJCC 8th Edition  - Clinical stage from 12/20/2021: Stage IB (cT1b, cN0, cM0) - Signed by Erum Knight MD on 1/27/2022          HISTORY OF PRESENT  ILLNESS: Andrea Quintero is a 69 y.o. male  who presents for follow-up of malignant melanoma of torso excluding breasts.    Eliud valdes, he is doing well. He has no concern about anything on his skin. He is scheduled for another Moh's treatment here on 02/2024. He denies any existence of any lumps or bumps. He was last seen on 01/2022.  He had undergone ultrasound on the lymph nodes in radiology today. He had the same on 3/2023. Dr. Magallanes does it every 3 months to every 6 months. He states his appetite is good. He last visited his dermatologist on 12/2023. He had his blood work done today and it came out good.    He describes feeling tired frequently during midafternoon even while sitting at his office mostly during winter. He denies feeling tired during summers. He does not sleep well at night. He will visit his primary care doctor on Monday. He denies doing exercise regularly due to cold, but he does it in summer.        Review of Systems   Constitutional:  Positive for fatigue. Negative for appetite change, fever and unexpected weight change.   HENT:   Negative for lump/mass, trouble swallowing and voice change.    Eyes:  Negative for icterus.   Respiratory:  Negative for cough, shortness of breath and wheezing.    Cardiovascular:  Negative for leg swelling.   Gastrointestinal:  Negative for abdominal distention, abdominal pain, blood in stool, constipation, diarrhea, nausea and vomiting.   Genitourinary:  Negative for difficulty urinating and hematuria.    Musculoskeletal:  Negative for arthralgias, gait problem and myalgias.   Skin:  Negative for itching and rash.        No new, changing, or concerning lesions.   Neurological:  Negative for extremity weakness, gait problem, headaches, light-headedness and numbness.   Hematological:  Negative for adenopathy.        MEDICATIONS:    Current Outpatient Medications:   •  atorvastatin (LIPITOR) 20 mg tablet, Take 1 tablet (20 mg total) by mouth daily, Disp: 90 tablet,  Rfl: 1  •  clobetasol propionate (OLUX-E) 0.05 % topical foam, APPLY TO AFFECTED AREA OF SKIN UP TO TWICE DAILY FOR TWO WEEKS ON, AND ONE WEEK OFF, THEN AS NEEDED WITH FLARES, Disp: , Rfl:   •  losartan (COZAAR) 100 MG tablet, Take 1 tablet (100 mg total) by mouth daily, Disp: 90 tablet, Rfl: 0  •  omeprazole (PriLOSEC) 40 MG capsule, Take 40 mg by mouth daily Every other day, Disp: , Rfl:   •  sildenafil (VIAGRA) 100 mg tablet, Take 0.5 tablets (50 mg total) by mouth daily as needed for erectile dysfunction, Disp: 30 tablet, Rfl: 1     ALLERGIES:  Allergies   Allergen Reactions   • Neomycin-Polymyxin-Dexameth      Annotation - [id number]: skin peeled around eyes   • Pollen Extract         ACTIVE PROBLEMS:  Patient Active Problem List   Diagnosis   • HTN (hypertension)   • Hypercholesterolemia   • Numerous moles   • GERD (gastroesophageal reflux disease)   • BMI 28.0-28.9,adult   • Personal history of malignant melanoma   • Encounter for follow-up surveillance of melanoma          PAST MEDICAL HISTORY:   Past Medical History:   Diagnosis Date   • Allergic    • Allergic rhinitis     Resolved 1/14/2016    • Allergy     Mild; spring and fall   • Cancer (HCC)     melanoma   • Colon polyp    • Diverticulitis    • Erectile dysfunction of nonorganic origin     Resolved 1/14/2016    • GERD (gastroesophageal reflux disease)    • Hyperlipidemia    • Hypertension    • Inflammatory bowel disease 11/42021    Diverticulitis   • Rosacea     Resolved 1/14/2016         PAST SURGICAL HISTORY:  Past Surgical History:   Procedure Laterality Date   • COLONOSCOPY     • FACIAL/NECK BIOPSY Right 01/09/2018    Procedure: CHEEK and LOWER EYELID BCC EXCISION AND COMPLEX CLOSURE;  Surgeon: Anoop Jensen MD;  Location: [facility];  Service: Plastics   • FL INJECTION LEFT HIP (NON ARTHROGRAM)  02/07/2022   • LYMPH NODE BIOPSY Left 02/15/2022    Procedure: BIOPSY LYMPH NODE SENTINEL (NUC MED INJECTION AT 1300);  Surgeon: Alessia Magallanes,  MD;  Location[facility];  Service: Surgical Oncology   • SKIN BIOPSY     • SKIN LESION EXCISION Left 02/15/2022    Procedure: UPPER BACK LESION WIDE EXCISION;  Surgeon: Alessia Magallanes MD;  Location: [facility];  Service: Surgical Oncology   • WISDOM TOOTH EXTRACTION          SOCIAL HISTORY:  Social History     Socioeconomic History   • Marital status: /Civil Union     Spouse name: Not on file   • Number of children: Not on file   • Years of education: Not on file   • Highest education level: Not on file   Occupational History   • Not on file   Tobacco Use   • Smoking status: Never     Passive exposure: Yes   • Smokeless tobacco: Never   • Tobacco comments:     cigar 6 times a year   Vaping Use   • Vaping status: Never Used   Substance and Sexual Activity   • Alcohol use: Yes     Alcohol/week: 7.0 standard drinks of alcohol     Types: 6 Glasses of wine, 1 Shots of liquor per week     Comment: occasionally   • Drug use: No   • Sexual activity: Yes     Partners: Female   Other Topics Concern   • Not on file   Social History Narrative    Former smoker - As per [employer]    Never used drugs - As per [employer]      Social Determinants of Health     Financial Resource Strain: Low Risk  (1/29/2024)    Overall Financial Resource Strain (CARDIA)    • Difficulty of Paying Living Expenses: Not hard at all   Food Insecurity: Not on file   Transportation Needs: No Transportation Needs (1/29/2024)    PRAPARE - Transportation    • Lack of Transportation (Medical): No    • Lack of Transportation (Non-Medical): No   Physical Activity: Not on file   Stress: Not on file   Social Connections: Not on file   Intimate Partner Violence: Not on file   Housing Stability: Not on file        FAMILY HISTORY:  Family History   Problem Relation Age of Onset   • Skin cancer Mother    • No Known Problems Father            Objective    PHYSICAL EXAMINATION:   Blood pressure 122/84, pulse 83, temperature 97.9 °F (36.6 °C), temperature  source Temporal, resp. rate 18, height 6' (1.829 m), weight 96.2 kg (212 lb), SpO2 99%.     Pain Score: 0-No pain            Physical Exam  Constitutional:       General: He is not in acute distress.     Appearance: Normal appearance. He is not ill-appearing or toxic-appearing.   HENT:      Head: Normocephalic and atraumatic.      Right Ear: External ear normal.      Left Ear: External ear normal.      Nose: Nose normal.      Mouth/Throat:      Mouth: Mucous membranes are moist.      Pharynx: Oropharynx is clear.   Eyes:      General: No scleral icterus.        Right eye: No discharge.         Left eye: No discharge.      Conjunctiva/sclera: Conjunctivae normal.   Cardiovascular:      Rate and Rhythm: Normal rate and regular rhythm.      Pulses: Normal pulses.      Heart sounds: No murmur heard.     No friction rub. No gallop.   Pulmonary:      Effort: Pulmonary effort is normal. No respiratory distress.      Breath sounds: Normal breath sounds. No wheezing or rales.   Abdominal:      General: Bowel sounds are normal. There is no distension.      Palpations: There is no mass.      Tenderness: There is no abdominal tenderness. There is no rebound.   Musculoskeletal:         General: No swelling or tenderness.      Cervical back: Normal range of motion. No rigidity.      Right lower leg: No edema.      Left lower leg: No edema.   Lymphadenopathy:      Head:      Right side of head: No submandibular, preauricular or posterior auricular adenopathy.      Left side of head: No submandibular, preauricular or posterior auricular adenopathy.      Cervical: No cervical adenopathy.      Right cervical: No superficial or posterior cervical adenopathy.     Left cervical: No superficial or posterior cervical adenopathy.      Upper Body:      Right upper body: No supraclavicular or axillary adenopathy.      Left upper body: No supraclavicular or axillary adenopathy.      Lower Body: No right inguinal adenopathy. No left inguinal  adenopathy.   Skin:     General: Skin is warm.      Coloration: Skin is not jaundiced.      Findings: No lesion or rash.      Comments: Well healed surgical scar.  No evidence of recurrence at primary site.   Neurological:      General: No focal deficit present.      Mental Status: He is alert and oriented to person, place, and time.      Cranial Nerves: No cranial nerve deficit.      Motor: No weakness.      Gait: Gait normal.   Psychiatric:         Mood and Affect: Mood normal.         Behavior: Behavior normal.         Thought Content: Thought content normal.         Judgment: Judgment normal.         I reviewed lab data in the chart.    Lab report reviewed with patient.    WBC   Date Value Ref Range Status   07/17/2024 5.05 4.31 - 10.16 Thousand/uL Final   01/10/2024 5.24 4.31 - 10.16 Thousand/uL Final   06/15/2023 5.21 4.31 - 10.16 Thousand/uL Final     Hemoglobin   Date Value Ref Range Status   07/17/2024 15.6 12.0 - 17.0 g/dL Final   01/10/2024 15.7 12.0 - 17.0 g/dL Final   06/15/2023 15.0 12.0 - 17.0 g/dL Final     Platelets   Date Value Ref Range Status   07/17/2024 195 149 - 390 Thousands/uL Final   01/10/2024 211 149 - 390 Thousands/uL Final   06/15/2023 188 149 - 390 Thousands/uL Final     MCV   Date Value Ref Range Status   07/17/2024 94 82 - 98 fL Final   01/10/2024 96 82 - 98 fL Final   06/15/2023 95 82 - 98 fL Final      Potassium   Date Value Ref Range Status   07/17/2024 3.9 3.5 - 5.3 mmol/L Final   01/10/2024 4.0 3.5 - 5.3 mmol/L Final   06/15/2023 4.1 3.5 - 5.3 mmol/L Final     Chloride   Date Value Ref Range Status   07/17/2024 105 96 - 108 mmol/L Final   01/10/2024 106 96 - 108 mmol/L Final   06/15/2023 110 (H) 96 - 108 mmol/L Final     CO2   Date Value Ref Range Status   07/17/2024 22 21 - 32 mmol/L Final   01/10/2024 27 21 - 32 mmol/L Final   06/15/2023 23 21 - 32 mmol/L Final     BUN   Date Value Ref Range Status   07/17/2024 18 5 - 25 mg/dL Final   01/10/2024 18 5 - 25 mg/dL Final    06/15/2023 19 5 - 25 mg/dL Final     Creatinine   Date Value Ref Range Status   07/17/2024 1.05 0.60 - 1.30 mg/dL Final     Comment:     Standardized to IDMS reference method   01/10/2024 1.21 0.60 - 1.30 mg/dL Final     Comment:     Standardized to IDMS reference method   06/15/2023 1.21 0.60 - 1.30 mg/dL Final     Comment:     Standardized to IDMS reference method     Glucose   Date Value Ref Range Status   11/04/2021 94 65 - 140 mg/dL Final     Comment:     If the patient is fasting, the ADA then defines impaired fasting glucose as > 100 mg/dL and diabetes as > or equal to 123 mg/dL.  Specimen collection should occur prior to Sulfasalazine administration due to the potential for falsely depressed results. Specimen collection should occur prior to Sulfapyridine administration due to the potential for falsely elevated results.     Calcium   Date Value Ref Range Status   07/17/2024 9.1 8.4 - 10.2 mg/dL Final   01/10/2024 9.5 8.4 - 10.2 mg/dL Final   06/15/2023 9.3 8.3 - 10.1 mg/dL Final     Albumin   Date Value Ref Range Status   07/17/2024 4.1 3.5 - 5.0 g/dL Final   01/10/2024 4.2 3.5 - 5.0 g/dL Final   06/15/2023 3.8 3.5 - 5.0 g/dL Final     Total Bilirubin   Date Value Ref Range Status   07/17/2024 0.85 0.20 - 1.00 mg/dL Final     Comment:     Use of this assay is not recommended for patients undergoing treatment with eltrombopag due to the potential for falsely elevated results.  N-acetyl-p-benzoquinone imine (metabolite of Acetaminophen) will generate erroneously low results in samples for patients that have taken an overdose of Acetaminophen.   01/10/2024 0.96 0.20 - 1.00 mg/dL Final     Comment:     Use of this assay is not recommended for patients undergoing treatment with eltrombopag due to the potential for falsely elevated results.  N-acetyl-p-benzoquinone imine (metabolite of Acetaminophen) will generate erroneously low results in samples for patients that have taken an overdose of Acetaminophen.  "  06/15/2023 0.94 0.20 - 1.00 mg/dL Final     Comment:     Use of this assay is not recommended for patients undergoing treatment with eltrombopag due to the potential for falsely elevated results.     Alkaline Phosphatase   Date Value Ref Range Status   07/17/2024 55 34 - 104 U/L Final   01/10/2024 50 34 - 104 U/L Final   06/15/2023 57 46 - 116 U/L Final     AST   Date Value Ref Range Status   07/17/2024 18 13 - 39 U/L Final   01/10/2024 18 13 - 39 U/L Final   06/15/2023 16 5 - 45 U/L Final     Comment:     Specimen collection should occur prior to Sulfasalazine administration due to the potential for falsely depressed results.      ALT   Date Value Ref Range Status   07/17/2024 20 7 - 52 U/L Final     Comment:     Specimen collection should occur prior to Sulfasalazine administration due to the potential for falsely depressed results.    01/10/2024 20 7 - 52 U/L Final     Comment:     Specimen collection should occur prior to Sulfasalazine administration due to the potential for falsely depressed results.    06/15/2023 29 12 - 78 U/L Final     Comment:     Specimen collection should occur prior to Sulfasalazine and/or Sulfapyridine administration due to the potential for falsely depressed results.       LD   Date Value Ref Range Status   07/17/2024 162 140 - 271 U/L Final   01/10/2024 168 140 - 271 U/L Final   06/15/2023 197 81 - 234 U/L Final     No results found for: \"TSH\"  No results found for: \"A5EHBIL\"   No results found for: \"FREET4\"      RECENT IMAGING:  No results found.          Assessment/Plan  1. Malignant melanoma of torso excluding breast (HCC)  -     Cbc, cmp. Ld levels     2. Encounter for follow-up surveillance of melanoma  Assessment & Plan:  The patient is a 69-year-old male with stage 1b melanoma here for continued monitoring, follow-up in surveillance. He is doing well and has no clinical evidence of melanoma recurrence or metastasis. Labs reviewed and okay. All values are within normal limits. " He continues to follow with dermatology as well as surgical oncology. He does have periodic imaging with ultrasounds of his head and neck lymph nodes which have been negative in the past. He had one earlier today with results pending. . We will continue to follow him every 6 months at this time. He will return for skin check and blood work. Orders placed and scripts provided. He knows to call with issues or concerns prior to his next visit.    CBC, cmp and LD level check  F/u after 6 months    Erum Knight MD, PhD    Transcribed for Erum Knight MD, by BISMITA BEHERA on 07/25/24 at 9:49 AM. Powered by Dragon [employer].   Attestation signed by Erum Knight MD at 7/31/2024  8:25 AM:  Attending Attestation:    I reviewed the care furnished by the Resident/Fellow during the visit on 7/25/2024.  I was present in the office and personally saw and examined the patient.    Mr. Quintero is a 69-year-old gentleman with stage Ib melanoma here for continued monitoring, follow-up and surveillance.  He is doing well with some fatigue.  Denies any new, changing, concerning skin lesions.  Denies lymphadenopathy.  Does follow with dermatology.      On exam ECOG PS 0.  No concerning skin lesions.  Scar is well-healed without any evidence of recurrence at the primary site.  No lymphadenopathy.    Mr. Helton is a 69-year-old gentleman with stage Ib melanoma here for continued monitoring, follow-up and surveillance.  He has no clinical evidence of melanoma recurrence.  Labs reviewed and okay.  He continues to follow with surgical oncology who does get ultrasounds of lymph nodes due to not on mapping of the sentinel lymph node.  These have been normal in appearance.  He will return for follow-up in 6 months.  He knows to call with issues or concerns prior to his next visit.  All orders placed and prescription provided for blood work to be done prior to his visit in 6 months.    Erum Knight MD, PhD

## 2024-08-01 ENCOUNTER — OFFICE VISIT (OUTPATIENT)
Dept: INTERNAL MEDICINE CLINIC | Age: 69
End: 2024-08-01
Payer: MEDICARE

## 2024-08-01 VITALS
HEART RATE: 73 BPM | OXYGEN SATURATION: 99 % | SYSTOLIC BLOOD PRESSURE: 128 MMHG | WEIGHT: 213 LBS | BODY MASS INDEX: 28.85 KG/M2 | HEIGHT: 72 IN | DIASTOLIC BLOOD PRESSURE: 84 MMHG | TEMPERATURE: 98 F

## 2024-08-01 DIAGNOSIS — E78.00 HYPERCHOLESTEROLEMIA: ICD-10-CM

## 2024-08-01 DIAGNOSIS — D22.9 NUMEROUS MOLES: ICD-10-CM

## 2024-08-01 DIAGNOSIS — Z85.820 PERSONAL HISTORY OF MALIGNANT MELANOMA: ICD-10-CM

## 2024-08-01 DIAGNOSIS — I10 PRIMARY HYPERTENSION: Primary | ICD-10-CM

## 2024-08-01 DIAGNOSIS — Z12.5 SCREENING FOR PROSTATE CANCER: ICD-10-CM

## 2024-08-01 PROCEDURE — G2211 COMPLEX E/M VISIT ADD ON: HCPCS | Performed by: INTERNAL MEDICINE

## 2024-08-01 PROCEDURE — 99214 OFFICE O/P EST MOD 30 MIN: CPT | Performed by: INTERNAL MEDICINE

## 2024-08-01 NOTE — PROGRESS NOTES
Assessment/Plan:     Diagnoses and all orders for this visit:    Primary hypertension    Hypercholesterolemia    Numerous moles    Personal history of malignant melanoma           M*Modal software was used to dictate this note.  It may contain errors with dictating incorrect words or incorrect spelling. Please contact the provider directly with any questions.    Subjective:   Chief Complaint   Patient presents with    Follow-up     Patient here for 6 month follow up exam   Labs done- 7/17/2024      Hypertension    Hyperlipidemia    [facility].        Patient ID: Andrea Quintero is a 69 y.o. male.    HPI  This is a very pleasant 69 years young gentleman who is here today for the regular follow-up doing well no new complaints sometimes urgency in the urination otherwise review of system is essentially unremarkable he is a still working full-time and active gentleman  Patient is here today for the follow-up.   Hypertension. I reviewed antihypertensive medication, patient does not have any side effects of  medications, no signs or symptoms of hypertension ,hypotension or orthostatic hypotension.  Patient is compliant with medications.  Blood workup related to hypertensive diagnosis reviewed.  Plan is to continue with the present management.  We will follow-up as a scheduled and adjust the doses of the medication as indicated.  Last lipid panel was good my hypercholesterolemia  So esophageal reflux disease he is using present all every other day and he is doing well with that I will recommend him to see if he can slowly wean himself off may be taking as needed if he cannot do that  Workup reviewed  In 6 months unless any problem  The following portions of the patient's history were reviewed and updated as appropriate: allergies, current medications, past family history, past medical history, past social history, past surgical history, and problem list.    Review of Systems   Constitutional:  Negative for appetite  change, fatigue and fever.   HENT:  Negative for congestion, ear pain, hearing loss, nosebleeds, sneezing, tinnitus and voice change.    Eyes:  Negative for pain, discharge and redness.   Respiratory:  Negative for cough, chest tightness and wheezing.    Cardiovascular:  Negative for chest pain, palpitations and leg swelling.   Gastrointestinal:  Negative for abdominal pain, blood in stool, constipation, diarrhea, nausea and vomiting.   Genitourinary:  Positive for urgency. Negative for difficulty urinating, dysuria and hematuria.   Musculoskeletal:  Negative for arthralgias, back pain, gait problem and joint swelling.   Skin:  Negative for rash and wound.   Allergic/Immunologic: Negative for environmental allergies.   Neurological:  Negative for dizziness, tremors, seizures, weakness, light-headedness and numbness.   Hematological:  Negative for adenopathy. Does not bruise/bleed easily.   Psychiatric/Behavioral:  Negative for behavioral problems and confusion. The patient is not nervous/anxious.          Past Medical History:   Diagnosis Date    Allergic     Allergic rhinitis     Resolved 1/14/2016     Allergy     Mild; spring and fall    Cancer (HCC)     melanoma    Colon polyp     Diverticulitis     Erectile dysfunction of nonorganic origin     Resolved 1/14/2016     GERD (gastroesophageal reflux disease)     Hyperlipidemia     Hypertension     Inflammatory bowel disease 11/42021    Diverticulitis    Rosacea     Resolved 1/14/2016          Current Outpatient Medications:     atorvastatin (LIPITOR) 20 mg tablet, Take 1 tablet (20 mg total) by mouth daily, Disp: 90 tablet, Rfl: 1    clobetasol propionate (OLUX-E) 0.05 % topical foam, APPLY TO AFFECTED AREA OF SKIN UP TO TWICE DAILY FOR TWO WEEKS ON, AND ONE WEEK OFF, THEN AS NEEDED WITH FLARES, Disp: , Rfl:     losartan (COZAAR) 100 MG tablet, Take 1 tablet (100 mg total) by mouth daily, Disp: 90 tablet, Rfl: 0    omeprazole (PriLOSEC) 40 MG capsule, Take 40 mg by  mouth daily Every other day, Disp: , Rfl:     Allergies   Allergen Reactions    Neomycin-Polymyxin-Dexameth      Annotation - [id number]: skin peeled around eyes    Pollen Extract        Social History   Past Surgical History:   Procedure Laterality Date    COLONOSCOPY      FACIAL/NECK BIOPSY Right 01/09/2018    Procedure: CHEEK and LOWER EYELID BCC EXCISION AND COMPLEX CLOSURE;  Surgeon: Anoop Jensen MD;  [facility];  Service: Plastics    FL INJECTION LEFT HIP (NON ARTHROGRAM)  02/07/2022    LYMPH NODE BIOPSY Left 02/15/2022    Procedure: BIOPSY LYMPH NODE SENTINEL (NUC MED INJECTION AT 1300);  Surgeon: Alessia Magallanes MD;  [facility];  Service: Surgical Oncology    SKIN BIOPSY      SKIN LESION EXCISION Left 02/15/2022    Procedure: UPPER BACK LESION WIDE EXCISION;  Surgeon: Alessia Magallanes MD;  [facility];  Service: Surgical Oncology    WISDOM TOOTH EXTRACTION       Family History   Problem Relation Age of Onset    Skin cancer Mother     No Known Problems Father        Objective:  /84 (BP Location: Left arm, Patient Position: Sitting, Cuff Size: Large)   Pulse 73   Temp 98 °F (36.7 °C) (Temporal)   Ht 6' (1.829 m)   Wt 96.6 kg (213 lb)   SpO2 99%   BMI 28.89 kg/m²        Physical Exam  Constitutional:       Appearance: He is well-developed.   HENT:      Right Ear: Tympanic membrane and external ear normal.      Left Ear: Tympanic membrane normal.   Eyes:      Conjunctiva/sclera: Conjunctivae normal.      Pupils: Pupils are equal, round, and reactive to light.   Neck:      Thyroid: No thyromegaly.      Vascular: No JVD.   Cardiovascular:      Rate and Rhythm: Normal rate and regular rhythm.      Heart sounds: Normal heart sounds.   Pulmonary:      Breath sounds: Normal breath sounds.   Abdominal:      General: Bowel sounds are normal.      Palpations: Abdomen is soft.   Musculoskeletal:         General: Normal range of motion.      Cervical back: Normal range of  motion.   Lymphadenopathy:      Cervical: No cervical adenopathy.   Skin:     General: Skin is dry.   Neurological:      General: No focal deficit present.      Mental Status: He is alert and oriented to person, place, and time. Mental status is at baseline.      Deep Tendon Reflexes: Reflexes are normal and symmetric.   Psychiatric:         Mood and Affect: Mood normal.         Behavior: Behavior normal.

## 2024-08-06 ENCOUNTER — TELEPHONE (OUTPATIENT)
Dept: SURGICAL ONCOLOGY | Facility: CLINIC | Age: 69
End: 2024-08-06

## 2024-08-06 NOTE — TELEPHONE ENCOUNTER
Left hopeline number for patient if the reschedule of his office visit with Dr Magallanes is now 10/29/24 @ 9:45 am Gera

## 2024-08-27 DIAGNOSIS — E78.2 MIXED HYPERLIPIDEMIA: ICD-10-CM

## 2024-08-28 RX ORDER — ATORVASTATIN CALCIUM 20 MG/1
20 TABLET, FILM COATED ORAL DAILY
Qty: 90 TABLET | Refills: 1 | Status: SHIPPED | OUTPATIENT
Start: 2024-08-28

## 2024-10-03 ENCOUNTER — TELEPHONE (OUTPATIENT)
Dept: SURGICAL ONCOLOGY | Facility: CLINIC | Age: 69
End: 2024-10-03

## 2024-10-03 NOTE — TELEPHONE ENCOUNTER
Called patient, no answer and left message.The appointment with Dr. Magallanes will be rescheduled due to Dr. Magallanes in the OR on 10/29/24. A PWA message was sent for patient.

## 2024-10-16 ENCOUNTER — HOSPITAL ENCOUNTER (OUTPATIENT)
Dept: ULTRASOUND IMAGING | Facility: HOSPITAL | Age: 69
Discharge: HOME | End: 2024-10-16
Attending: STUDENT IN AN ORGANIZED HEALTH CARE EDUCATION/TRAINING PROGRAM
Payer: MEDICARE

## 2024-10-16 DIAGNOSIS — Z08 ENCOUNTER FOR FOLLOW-UP SURVEILLANCE OF MELANOMA: ICD-10-CM

## 2024-10-16 DIAGNOSIS — Z85.820 ENCOUNTER FOR FOLLOW-UP SURVEILLANCE OF MELANOMA: ICD-10-CM

## 2024-10-16 PROCEDURE — 76536 US EXAM OF HEAD AND NECK: CPT

## 2024-10-28 ENCOUNTER — TELEPHONE (OUTPATIENT)
Age: 69
End: 2024-10-28

## 2024-10-28 NOTE — TELEPHONE ENCOUNTER
"Pt has had some urinary issues with frequency and possible stone. Per Dr. Rai, this morning, he should get a urine test and a CT scan. Pt called Dr. Rai.    Can an order be placed for the urine test? Pt will go to a [facility] for that.    Please call patient back on work phone [phone] press option 3 (\"Scotty\")  "

## 2024-10-29 ENCOUNTER — HOSPITAL ENCOUNTER (OUTPATIENT)
Dept: CT IMAGING | Facility: HOSPITAL | Age: 69
Discharge: HOME | End: 2024-10-29
Attending: INTERNAL MEDICINE
Payer: MEDICARE

## 2024-10-29 ENCOUNTER — APPOINTMENT (OUTPATIENT)
Dept: LAB | Facility: HOSPITAL | Age: 69
End: 2024-10-29
Payer: MEDICARE

## 2024-10-29 ENCOUNTER — OFFICE VISIT (OUTPATIENT)
Dept: INTERNAL MEDICINE CLINIC | Age: 69
End: 2024-10-29
Payer: MEDICARE

## 2024-10-29 VITALS
TEMPERATURE: 97.4 F | DIASTOLIC BLOOD PRESSURE: 70 MMHG | BODY MASS INDEX: 28.58 KG/M2 | HEART RATE: 76 BPM | OXYGEN SATURATION: 96 % | SYSTOLIC BLOOD PRESSURE: 130 MMHG | HEIGHT: 72 IN | WEIGHT: 211 LBS

## 2024-10-29 DIAGNOSIS — R10.9 ACUTE LEFT FLANK PAIN: Primary | ICD-10-CM

## 2024-10-29 DIAGNOSIS — E78.00 HYPERCHOLESTEROLEMIA: ICD-10-CM

## 2024-10-29 DIAGNOSIS — I10 PRIMARY HYPERTENSION: ICD-10-CM

## 2024-10-29 DIAGNOSIS — C43.59 MALIGNANT MELANOMA OF TORSO EXCLUDING BREAST (HCC): ICD-10-CM

## 2024-10-29 DIAGNOSIS — R10.9 ACUTE LEFT FLANK PAIN: ICD-10-CM

## 2024-10-29 DIAGNOSIS — N20.0 LEFT NEPHROLITHIASIS: Primary | ICD-10-CM

## 2024-10-29 DIAGNOSIS — C43.59 MALIGNANT MELANOMA OF UPPER BACK (HCC): ICD-10-CM

## 2024-10-29 DIAGNOSIS — Z12.5 SCREENING FOR PROSTATE CANCER: ICD-10-CM

## 2024-10-29 DIAGNOSIS — Z85.820 ENCOUNTER FOR FOLLOW-UP SURVEILLANCE OF MELANOMA: ICD-10-CM

## 2024-10-29 DIAGNOSIS — Z08 ENCOUNTER FOR FOLLOW-UP SURVEILLANCE OF MELANOMA: ICD-10-CM

## 2024-10-29 DIAGNOSIS — K21.9 GASTROESOPHAGEAL REFLUX DISEASE WITHOUT ESOPHAGITIS: ICD-10-CM

## 2024-10-29 LAB
ALBUMIN SERPL BCG-MCNC: 4.4 G/DL (ref 3.5–5)
ALP SERPL-CCNC: 49 U/L (ref 34–104)
ALT SERPL W P-5'-P-CCNC: 21 U/L (ref 7–52)
ANION GAP SERPL CALCULATED.3IONS-SCNC: 7 MMOL/L (ref 4–13)
AST SERPL W P-5'-P-CCNC: 17 U/L (ref 13–39)
BASOPHILS # BLD AUTO: 0.02 THOUSANDS/ΜL (ref 0–0.1)
BASOPHILS NFR BLD AUTO: 0 % (ref 0–1)
BILIRUB SERPL-MCNC: 0.86 MG/DL (ref 0.2–1)
BUN SERPL-MCNC: 16 MG/DL (ref 5–25)
CALCIUM SERPL-MCNC: 9.4 MG/DL (ref 8.4–10.2)
CHLORIDE SERPL-SCNC: 101 MMOL/L (ref 96–108)
CHOLEST SERPL-MCNC: 182 MG/DL
CO2 SERPL-SCNC: 30 MMOL/L (ref 21–32)
CREAT SERPL-MCNC: 1.16 MG/DL (ref 0.6–1.3)
EOSINOPHIL # BLD AUTO: 0.06 THOUSAND/ΜL (ref 0–0.61)
EOSINOPHIL NFR BLD AUTO: 1 % (ref 0–6)
ERYTHROCYTE [DISTWIDTH] IN BLOOD BY AUTOMATED COUNT: 13 % (ref 11.6–15.1)
GFR SERPL CREATININE-BSD FRML MDRD: 63 ML/MIN/1.73SQ M
GLUCOSE P FAST SERPL-MCNC: 97 MG/DL (ref 65–99)
GLUCOSE SERPL-MCNC: 97 MG/DL (ref 65–140)
HCT VFR BLD AUTO: 46.2 % (ref 36.5–49.3)
HDLC SERPL-MCNC: 53 MG/DL
HGB BLD-MCNC: 15.3 G/DL (ref 12–17)
IMM GRANULOCYTES # BLD AUTO: 0.01 THOUSAND/UL (ref 0–0.2)
IMM GRANULOCYTES NFR BLD AUTO: 0 % (ref 0–2)
LDLC SERPL CALC-MCNC: 85 MG/DL (ref 0–100)
LYMPHOCYTES # BLD AUTO: 1.49 THOUSANDS/ΜL (ref 0.6–4.47)
LYMPHOCYTES NFR BLD AUTO: 25 % (ref 14–44)
MCH RBC QN AUTO: 31.3 PG (ref 26.8–34.3)
MCHC RBC AUTO-ENTMCNC: 33.1 G/DL (ref 31.4–37.4)
MCV RBC AUTO: 95 FL (ref 82–98)
MONOCYTES # BLD AUTO: 0.52 THOUSAND/ΜL (ref 0.17–1.22)
MONOCYTES NFR BLD AUTO: 9 % (ref 4–12)
NEUTROPHILS # BLD AUTO: 3.78 THOUSANDS/ΜL (ref 1.85–7.62)
NEUTS SEG NFR BLD AUTO: 65 % (ref 43–75)
NONHDLC SERPL-MCNC: 129 MG/DL
NRBC BLD AUTO-RTO: 0 /100 WBCS
PLATELET # BLD AUTO: 221 THOUSANDS/UL (ref 149–390)
PMV BLD AUTO: 9.8 FL (ref 8.9–12.7)
POTASSIUM SERPL-SCNC: 3.7 MMOL/L (ref 3.5–5.3)
PROT SERPL-MCNC: 7.2 G/DL (ref 6.4–8.4)
PSA SERPL-MCNC: 2.31 NG/ML (ref 0–4)
RBC # BLD AUTO: 4.89 MILLION/UL (ref 3.88–5.62)
SL AMB  POCT GLUCOSE, UA: NORMAL
SL AMB LEUKOCYTE ESTERASE,UA: NORMAL
SL AMB POCT BILIRUBIN,UA: NORMAL
SL AMB POCT BLOOD,UA: NORMAL
SL AMB POCT CLARITY,UA: CLEAR
SL AMB POCT COLOR,UA: YELLOW
SL AMB POCT KETONES,UA: NORMAL
SL AMB POCT NITRITE,UA: NORMAL
SL AMB POCT PH,UA: 5.5
SL AMB POCT SPECIFIC GRAVITY,UA: 1.02
SL AMB POCT URINE PROTEIN: NORMAL
SL AMB POCT UROBILINOGEN: 0.2
SODIUM SERPL-SCNC: 138 MMOL/L (ref 135–147)
TRIGL SERPL-MCNC: 221 MG/DL
TSH SERPL DL<=0.05 MIU/L-ACNC: 1.63 UIU/ML (ref 0.45–4.5)
WBC # BLD AUTO: 5.88 THOUSAND/UL (ref 4.31–10.16)

## 2024-10-29 PROCEDURE — 74176 CT ABD & PELVIS W/O CONTRAST: CPT

## 2024-10-29 PROCEDURE — G0103 PSA SCREENING: HCPCS

## 2024-10-29 PROCEDURE — 36415 COLL VENOUS BLD VENIPUNCTURE: CPT

## 2024-10-29 PROCEDURE — 80061 LIPID PANEL: CPT

## 2024-10-29 PROCEDURE — 84443 ASSAY THYROID STIM HORMONE: CPT

## 2024-10-29 PROCEDURE — 99214 OFFICE O/P EST MOD 30 MIN: CPT | Performed by: INTERNAL MEDICINE

## 2024-10-29 PROCEDURE — 80053 COMPREHEN METABOLIC PANEL: CPT | Performed by: INTERNAL MEDICINE

## 2024-10-29 PROCEDURE — G2211 COMPLEX E/M VISIT ADD ON: HCPCS | Performed by: INTERNAL MEDICINE

## 2024-10-29 PROCEDURE — 85025 COMPLETE CBC W/AUTO DIFF WBC: CPT

## 2024-10-29 PROCEDURE — 81003 URINALYSIS AUTO W/O SCOPE: CPT | Performed by: INTERNAL MEDICINE

## 2024-10-29 NOTE — PROGRESS NOTES
Assessment/Plan:    No problem-specific Assessment & Plan notes found for this encounter.       There are no diagnoses linked to this encounter.           Subjective:          Patient ID: Andrea Quintero is a 69 y.o. male.    Patient came to office with a complaint of left flank pain with 1 episode of hematuria.  This happened while he was a vacationing in [place].  This happened about a week ago.  He have a urgency to urinate but was unable to urinate for a brief.  Of time.  Pain accompanied left flank then moved to the left groin area.  After about 4-hour he noted with a clot of blood and probably passed stone.  He was evaluated by physician initially and was treated with ciprofloxacin 500 mg twice daily.  Since that and then no more episode.  No fever or chills.  No nausea or vomiting.    Back Pain  Pertinent negatives include no abdominal pain, chest pain, dysuria, fever, headaches or weakness.       The following portions of the patient's history were reviewed and updated as appropriate: allergies, current medications, past family history, past medical history, past social history, past surgical history, and problem list.    Review of Systems   Constitutional:  Negative for fatigue and fever.   HENT:  Negative for congestion, ear discharge, ear pain, postnasal drip, sinus pressure, sore throat, tinnitus and trouble swallowing.    Eyes:  Negative for discharge, itching and visual disturbance.   Respiratory:  Negative for cough and shortness of breath.    Cardiovascular:  Negative for chest pain and palpitations.   Gastrointestinal:  Negative for abdominal pain, diarrhea, nausea and vomiting.   Endocrine: Negative for cold intolerance and polyuria.   Genitourinary:  Negative for difficulty urinating, dysuria and urgency.   Musculoskeletal:  Positive for back pain. Negative for arthralgias and neck pain.   Skin:  Negative for color change and rash.   Allergic/Immunologic: Negative for environmental allergies.    Neurological:  Negative for dizziness, weakness and headaches.   Psychiatric/Behavioral:  The patient is not nervous/anxious.          Past Medical History:   Diagnosis Date    Allergic     Allergic rhinitis     Resolved 1/14/2016     Allergy     Mild; spring and fall    Cancer (HCC)     melanoma    Colon polyp     Diverticulitis     Erectile dysfunction of nonorganic origin     Resolved 1/14/2016     GERD (gastroesophageal reflux disease)     Hyperlipidemia     Hypertension     Inflammatory bowel disease 11/42021    Diverticulitis    Rosacea     Resolved 1/14/2016          Current Outpatient Medications:     atorvastatin (LIPITOR) 20 mg tablet, Take 1 tablet (20 mg total) by mouth daily, Disp: 90 tablet, Rfl: 1    clobetasol propionate (OLUX-E) 0.05 % topical foam, APPLY TO AFFECTED AREA OF SKIN UP TO TWICE DAILY FOR TWO WEEKS ON, AND ONE WEEK OFF, THEN AS NEEDED WITH FLARES, Disp: , Rfl:     losartan (COZAAR) 100 MG tablet, Take 1 tablet (100 mg total) by mouth daily, Disp: 90 tablet, Rfl: 0    omeprazole (PriLOSEC) 40 MG capsule, Take 40 mg by mouth daily Every other day, Disp: , Rfl:     Allergies   Allergen Reactions    Neomycin-Polymyxin-Dexameth      Annotation - [id number]: skin peeled around eyes    Pollen Extract        Social History   Past Surgical History:   Procedure Laterality Date    COLONOSCOPY      FACIAL/NECK BIOPSY Right 01/09/2018    Procedure: CHEEK and LOWER EYELID BCC EXCISION AND COMPLEX CLOSURE;  Surgeon: Anoop Jensen MD;  [facility];  Service: Plastics    FL INJECTION LEFT HIP (NON ARTHROGRAM)  02/07/2022    LYMPH NODE BIOPSY Left 02/15/2022    Procedure: BIOPSY LYMPH NODE SENTINEL (NUC MED INJECTION AT 1300);  Surgeon: Alessia Magallanes MD;  [facility];  Service: Surgical Oncology    SKIN BIOPSY      SKIN LESION EXCISION Left 02/15/2022    Procedure: UPPER BACK LESION WIDE EXCISION;  Surgeon: Alessia Magallanes MD;  [facility];  Service: Surgical  Oncology    WISDOM TOOTH EXTRACTION       Family History   Problem Relation Age of Onset    Skin cancer Mother     No Known Problems Father        Objective:  /70 (BP Location: Left arm, Patient Position: Sitting, Cuff Size: Standard)   Pulse 76   Temp (!) 97.4 °F (36.3 °C) (Temporal)   Ht 6' (1.829 m)   Wt 95.7 kg (211 lb)   SpO2 96%   BMI 28.62 kg/m²   Body mass index is 28.62 kg/m².     Physical Exam  Constitutional:       General: He is not in acute distress.     Appearance: He is well-developed.   HENT:      Head: Normocephalic.      Right Ear: External ear normal. There is no impacted cerumen.      Left Ear: External ear normal. There is no impacted cerumen.      Nose: No congestion or rhinorrhea.      Mouth/Throat:      Pharynx: No oropharyngeal exudate or posterior oropharyngeal erythema.   Eyes:      General: No scleral icterus.     Pupils: Pupils are equal, round, and reactive to light.   Neck:      Thyroid: No thyromegaly.      Trachea: No tracheal deviation.   Cardiovascular:      Rate and Rhythm: Normal rate and regular rhythm.      Heart sounds: Normal heart sounds. No murmur heard.     No friction rub.   Pulmonary:      Effort: Pulmonary effort is normal. No respiratory distress.      Breath sounds: Normal breath sounds.   Chest:      Chest wall: No tenderness.   Abdominal:      General: Bowel sounds are normal.      Palpations: Abdomen is soft. There is no mass.      Tenderness: There is no abdominal tenderness.   Musculoskeletal:         General: Normal range of motion.      Cervical back: Normal range of motion and neck supple. No rigidity.      Right lower leg: No edema.      Left lower leg: No edema.   Lymphadenopathy:      Cervical: No cervical adenopathy.   Skin:     General: Skin is warm.      Findings: No erythema or rash.   Neurological:      Mental Status: He is alert and oriented to person, place, and time.      Cranial Nerves: No cranial nerve deficit.   Psychiatric:          Mood and Affect: Mood normal.         Behavior: Behavior normal.

## 2024-10-29 NOTE — ASSESSMENT & PLAN NOTE
Most likely his left flank pain and an episode of hematuria was due to nephrolithiasis.    CT abdominal pelvis reviewed from 2021 which shows scattered less than 3 mm stone in left kidney.    Urine dip done in office today is negative for any infection or hematuria.  Will also request CT abdomen pelvis without contrast stat today for further evaluation of nephrolithiasis.    Advised for adequate hydration and to decrease alcohol/caffeine intake

## 2024-12-02 ENCOUNTER — TELEPHONE (OUTPATIENT)
Dept: UROLOGY | Facility: CLINIC | Age: 69
End: 2024-12-02

## 2024-12-17 ENCOUNTER — OFFICE VISIT (OUTPATIENT)
Dept: SURGICAL ONCOLOGY | Facility: CLINIC | Age: 69
End: 2024-12-17
Payer: MEDICARE

## 2024-12-17 VITALS
WEIGHT: 216 LBS | HEIGHT: 72 IN | RESPIRATION RATE: 16 BRPM | HEART RATE: 86 BPM | DIASTOLIC BLOOD PRESSURE: 74 MMHG | OXYGEN SATURATION: 98 % | SYSTOLIC BLOOD PRESSURE: 132 MMHG | BODY MASS INDEX: 29.26 KG/M2 | TEMPERATURE: 97.2 F

## 2024-12-17 DIAGNOSIS — Z85.820 PERSONAL HISTORY OF MALIGNANT MELANOMA: ICD-10-CM

## 2024-12-17 DIAGNOSIS — Z08 ENCOUNTER FOR FOLLOW-UP SURVEILLANCE OF MELANOMA: Primary | ICD-10-CM

## 2024-12-17 DIAGNOSIS — Z85.820 ENCOUNTER FOR FOLLOW-UP SURVEILLANCE OF MELANOMA: Primary | ICD-10-CM

## 2024-12-17 PROCEDURE — 99214 OFFICE O/P EST MOD 30 MIN: CPT | Performed by: STUDENT IN AN ORGANIZED HEALTH CARE EDUCATION/TRAINING PROGRAM

## 2024-12-17 NOTE — PROGRESS NOTES
Surgical Oncology Follow Up    [facility] [place]  [address] [id number] [phone]    Andrea Quintero  1955  [id number]      ASSESSMENT AND PLAN    1. Encounter for follow-up surveillance of melanoma  -     US head neck lymph node mapping; Future; Expected date: 06/17/2025  2. Personal history of malignant melanoma  Assessment & Plan:  Doing well. ANN on exam and on US. Will need another US in 6 mo and that will conclude 6 mo of surveillance US per MSLT-II. Will check in on his visits with Mari aL and be sure to see them q3-6 mo. All questions answered.         Chief Complaint   Patient presents with    office visit       Return in about 6 months (around 6/17/2025) for Imaging - See orders, Office Visit 15 min.      Oncology History   Personal history of malignant melanoma   12/20/2021 Biopsy    Left upper back Melamona: shave biopsy  Thickness: 0.8mm  Ulceration: none   Mitoses: 0  Margins: Approaches both lateral margins and extends to the deep margin      12/20/2021 -  Cancer Staged    Staging form: Melanoma of the Skin, AJCC 8th Edition  - Clinical stage from 12/20/2021: Stage IB (cT1b, cN0, cM0) - Signed by Erum Knight MD on 1/27/2022 1/17/2022 Initial Diagnosis    Malignant melanoma of upper back (HCC)     2/15/2022 Surgery    A. Lymph node, sentinel, left neck #1:  Two lymph nodes, negative for metastatic melanoma (0/2).    B. Lymph node, sentinel, left neck #2:  Two lymph nodes, negative for metastatic melanoma (0/2).    C. Skin and soft tissue, upper left back, wide excision: Scar.         Staging: T1bNx    Treatment history: s/p WLE and insufficient SLN bx 2/15/22  Current treatment: obs  Disease status: ANN    History of Present Illness:  Presents in f/u after wide local excision and sentinel lymph node biopsy for a final path T1b melanoma of the back.  The sentinel lymph node mapping in the operating  room was insufficient, with no dominant site of elevated Johnny counts or blue dye. US q6 mo, most recently in Oct, with no abnormal nodes. Pt reports no new lumps or bumps. No systemic sx of HA, bone pain, dizziness, fatigue, weight loss. Sees Maria L regularly; had a BCC removed in March of 2023 and again in March 2024 he had a BCC of left temple. He has not seen him since this time that he can recall.     Review of Systems  Complete ROS Surg Onc:   Constitutional: The patient denies new or recent history of general fatigue, no recent weight loss, no change in appetite.   Eyes: No complaints of visual problems, no scleral icterus.   ENT: no complaints of ear pain, no hoarseness, no difficulty swallowing,  no tinnitus and no new masses in head, oral cavity, or neck.   Cardiovascular: No complaints of chest pain, no palpitations, no ankle edema.   Respiratory: No complaints of shortness of breath, no cough.   Gastrointestinal: No complaints of jaundice, no bloody stools, no pale stools.   Genitourinary: No complaints of dysuria, no hematuria, no nocturia, no frequent urination, no urethral discharge.   Musculoskeletal: No complaints of weakness, paralysis, joint stiffness or arthralgias.  Integumentary: No complaints of rash, no new lesions.   Neurological: No complaints of convulsions, no seizures, no dizziness.   Hematologic/Lymphatic: No complaints of easy bruising.   Endocrine:  No hot or cold intolerance.  No polydipsia, polyphagia, or polyuria.  Allergy/immunology:  No environmental allergies.  No food allergies.  Not immunocompromised.  Skin:  No pallor or rash.  No wound.        Patient Active Problem List   Diagnosis    HTN (hypertension)    Hypercholesterolemia    Numerous moles    Gastroesophageal reflux disease without esophagitis    Acute left flank pain    BMI 28.0-28.9,adult    Personal history of malignant melanoma    Encounter for follow-up surveillance of melanoma     Past Medical History:    Diagnosis Date    Allergic     Allergic rhinitis     Resolved 1/14/2016     Allergy     Mild; spring and fall    Cancer (HCC)     melanoma    Colon polyp     Diverticulitis     Erectile dysfunction of nonorganic origin     Resolved 1/14/2016     GERD (gastroesophageal reflux disease)     Hyperlipidemia     Hypertension     Inflammatory bowel disease 11/42021    Diverticulitis    Rosacea     Resolved 1/14/2016      Past Surgical History:   Procedure Laterality Date    COLONOSCOPY      FACIAL/NECK BIOPSY Right 01/09/2018    Procedure: CHEEK and LOWER EYELID BCC EXCISION AND COMPLEX CLOSURE;  Surgeon: Anoop Jensen MD;  [facility];  Service: Plastics    FL INJECTION LEFT HIP (NON ARTHROGRAM)  02/07/2022    LYMPH NODE BIOPSY Left 02/15/2022    Procedure: BIOPSY LYMPH NODE SENTINEL (NUC MED INJECTION AT 1300);  Surgeon: Alessia Magallanes MD;  [facility];  Service: Surgical Oncology    SKIN BIOPSY      SKIN LESION EXCISION Left 02/15/2022    Procedure: UPPER BACK LESION WIDE EXCISION;  Surgeon: Alessia Magallanes MD;  [facility];  Service: Surgical Oncology    WISDOM TOOTH EXTRACTION       Family History   Problem Relation Age of Onset    Skin cancer Mother     No Known Problems Father      Social History     Socioeconomic History    Marital status: /Civil Union     Spouse name: Not on file    Number of children: Not on file    Years of education: Not on file    Highest education level: Not on file   Occupational History    Not on file   Tobacco Use    Smoking status: Never     Passive exposure: Yes    Smokeless tobacco: Never    Tobacco comments:     cigar 6 times a year   Vaping Use    Vaping status: Never Used   Substance and Sexual Activity    Alcohol use: Yes     Alcohol/week: 7.0 standard drinks of alcohol     Types: 6 Glasses of wine, 1 Shots of liquor per week     Comment: occasionally    Drug use: No    Sexual activity: Yes     Partners: Female   Other Topics Concern     Not on file   Social History Narrative    Former smoker - As per [employer]    Never used drugs - As per [employer]      Social Drivers of Health     Financial Resource Strain: Low Risk  (1/29/2024)    Overall Financial Resource Strain (CARDIA)     Difficulty of Paying Living Expenses: Not hard at all   Food Insecurity: Not on file   Transportation Needs: No Transportation Needs (1/29/2024)    PRAPARE - Transportation     Lack of Transportation (Medical): No     Lack of Transportation (Non-Medical): No   Physical Activity: Not on file   Stress: Not on file   Social Connections: Not on file   Intimate Partner Violence: Not on file   Housing Stability: Not on file       Current Outpatient Medications:     atorvastatin (LIPITOR) 20 mg tablet, Take 1 tablet (20 mg total) by mouth daily, Disp: 90 tablet, Rfl: 1    clobetasol propionate (OLUX-E) 0.05 % topical foam, APPLY TO AFFECTED AREA OF SKIN UP TO TWICE DAILY FOR TWO WEEKS ON, AND ONE WEEK OFF, THEN AS NEEDED WITH FLARES, Disp: , Rfl:     losartan (COZAAR) 100 MG tablet, Take 1 tablet (100 mg total) by mouth daily, Disp: 90 tablet, Rfl: 0    omeprazole (PriLOSEC) 40 MG capsule, Take 40 mg by mouth daily Every other day, Disp: , Rfl:   Allergies   Allergen Reactions    Neomycin-Polymyxin-Dexameth      Annotation - [id number]: skin peeled around eyes    Pollen Extract      Vitals:    12/17/24 0917   BP: 132/74   Pulse: 86   Resp: 16   Temp: (!) 97.2 °F (36.2 °C)   SpO2: 98%       Physical Exam  Constitutional: Patient is well-developed and well-nourished who appears the stated age in no acute distress. Patient is pleasant and talkative.     HEENT:  Normocephalic.  Sclerae are anicteric. Mucous membranes are moist. Neck is supple without adenopathy. Incision well-healed, No JVD.     Chest: Equal chest rise, easy WOB  Cardiac: Heart is regular rate.     Abdomen: Abdomen is non-tender, non-distended and without masses.     Extremities: There is no clubbing or cyanosis.  There is no edema.  Symmetric.  Neuro: Grossly nonfocal. Gait is normal.     Lymphatic: No evidence of cervical adenopathy bilaterally. No evidence of axillary adenopathy bilaterally. No evidence of inguinal adenopathy bilaterally.     Skin: Warm, anicteric.  Incision flat with no abnormality  Psych:  Patient is pleasant and talkative.    Pathology:  As above    Labs:  Reviewed in Year Up personally    Imaging  NM lymphatic melanoma    Addendum Date: 2/15/2022 Addendum:   ADDENDUM: Please note the dictation error documenting the site at left upper back. 0.48 mCi Tc-99m sulfur colloid (0.6 cc volume) was administered intradermally in divided doses in the region of the patients left upper back melanoma.      Result Date: 2/15/2022  Narrative: SENTINEL NODE LYMPHOSCINTIGRAPHY INDICATION:   Left upper back melanoma. FINDINGS: 0.48 mCi Tc-99m sulfur colloid (0.6 cc volume) was administered intradermally in divided doses in the region of the patients left upper pelvic melanoma.  Scintigraphic images were obtained over the chest and neck. Initially 2 foci of intense tracer uptake identified in the left neck base followed by a  slightly inferior tracer uptake.  Using scintigraphic guidance, the corresponding skin sites were marked with an indelible marker.  The patient was transferred to the operating room in satisfactory condition.     Impression: 1.  Hubert lymph node localized to left neck base to at least 3 foci of tracer uptake.. Workstation performed: [id number]     FL injection left hip (non arthrogram)    Result Date: 2/7/2022  Narrative: LEFT HIP INJECTION INDICATION:  Patient presents with prescription for left hip steroid injection. COMPARISON:  Plain films hips and pelvis left 1/8/2022. IMAGES:  3 FLUOROSCOPY TIME:  0.1 MFT. FINDINGS: After the risks and benefits of the procedure were thoroughly explained, informed consent was obtained. The patient was prepped and draped in the usual sterile fashion. 1%  lidocaine solution was utilized for local anesthesia.  Intermittent fluoroscopy was utilized for placement a 20 gauge  3.5 inch spinal needle within the left hip joint.  After positioning was confirmed with 3 mL of Omnipaque 300, a solution comprised of 1 mL 80 mg/mL Depomedrol, 2 mL of 0.25% Sensorcaine and 2 mL 1% Xylocaine. was injected into the left hip joint.  The patient tolerated the procedure well.  There were no complications.     Impression: Post injection patient does admit to mid improvement of his typical left hip pain. Technically successful left hip steroid injection. Procedure was performed by ELZA Biggs, PAELIZA under the direct supervision of Dr. Grande. Workstation performed: [id number]       I personally reviewed and intrepreted the laboratory and imaging data incl present and past iamging, derm notes, path, med onc notes, US of H&N

## 2024-12-17 NOTE — ASSESSMENT & PLAN NOTE
Doing well. ANN on exam and on US. Will need another US in 6 mo and that will conclude 6 mo of surveillance US per MSLT-II. Will check in on his visits with Maria L and be sure to see them q3-6 mo. All questions answered.

## 2024-12-19 DIAGNOSIS — I10 ESSENTIAL HYPERTENSION: ICD-10-CM

## 2024-12-19 RX ORDER — LOSARTAN POTASSIUM 100 MG/1
100 TABLET ORAL DAILY
Qty: 90 TABLET | Refills: 0 | Status: SHIPPED | OUTPATIENT
Start: 2024-12-19

## 2025-01-05 NOTE — PROGRESS NOTES
Referring Physician: Sudhir Rai MD  A copy of this note was sent to the referring physician.       Diagnoses and all orders for this visit:    Nephrolithiasis  -     XR abdomen 1 view kub; Future    Left nephrolithiasis  -     Ambulatory Referral to Urology    Malignant melanoma of upper back (HCC)            Assessment and plan:       Left intrarenal calculi  - multile peripheral left lower pole calculi on CT    2/ Prostate cancer screening  - PSA 2.308    It was a pleasure to evaluate the patient.  I reviewed his CAT scan and his recent episode of stone passage while traveling to [place]    Current CT reviewed he is currently asymptomatic.  We discussed options including observation, shockwave lithotripsy, of his ureteroscopy.  My recommendation for now is observation    Patient agrees.  He will return in 4 months time with her advanced practitioner team with a K prior or sooner if needed.  If the stones are radiopaque he interested in preemptive SWL..      Ayo Kimbrough MD      Chief Complaint     Kidney stone consult      History of Present Illness     Andrea Quintero is a 69 y.o. referred by Dr Domínguez for nephrolithiasis    Detailed Urologic History     - please refer to HPI    Review of Systems     Review of Systems   Constitutional: Negative for activity change and fatigue.   HENT: Negative for congestion.    Eyes: Negative for visual disturbance.   Respiratory: Negative for shortness of breath and wheezing.    Cardiovascular: Negative for chest pain and leg swelling.   Gastrointestinal: Negative for abdominal pain.   Endocrine: Negative for polyuria.   Genitourinary: Negative for dysuria, flank pain, hematuria and urgency.   Musculoskeletal: Negative for back pain.   Allergic/Immunologic: Negative for immunocompromised state.   Neurological: Negative for dizziness and numbness.   Psychiatric/Behavioral: Negative for dysphoric mood.   All other systems reviewed and are negative.    AUA SYMPTOM SCORE       Flowsheet Row Most Recent Value   AUA SYMPTOM SCORE    How often have you had a sensation of not emptying your bladder completely after you finished urinating? 2 (P)     How often have you had to urinate again less than two hours after you finished urinating? 1 (P)     How often have you found you stopped and started again several times when you urinate? 3 (P)     How often have you found it difficult to postpone urination? 1 (P)     How often have you had a weak urinary stream? 3 (P)     How often have you had to push or strain to begin urination? 2 (P)     How many times did you most typically get up to urinate from the time you went to bed at night until the time you got up in the morning? 1 (P)     Quality of Life: If you were to spend the rest of your life with your urinary condition just the way it is now, how would you feel about that? 3 (P)     AUA SYMPTOM SCORE 13 (P)                Allergies     Allergies   Allergen Reactions    Neomycin-Polymyxin-Dexameth      Annotation - [id number]: skin peeled around eyes    Pollen Extract        Physical Exam       Physical Exam  Constitutional:       General: He is not in acute distress.     Appearance: He is well-developed.   HENT:      Head: Normocephalic and atraumatic.   Cardiovascular:      Comments: Negative lower extremity edema  Pulmonary:      Effort: Pulmonary effort is normal.      Breath sounds: Normal breath sounds.   Abdominal:      Palpations: Abdomen is soft.   Musculoskeletal:         General: Normal range of motion.      Cervical back: Normal range of motion.   Skin:     General: Skin is warm.   Neurological:      Mental Status: He is alert and oriented to person, place, and time.   Psychiatric:         Behavior: Behavior normal.           Vital Signs  There were no vitals filed for this visit.      Current Medications       Current Outpatient Medications:     atorvastatin (LIPITOR) 20 mg tablet, Take 1 tablet (20 mg total) by mouth daily, Disp:  90 tablet, Rfl: 1    clobetasol propionate (OLUX-E) 0.05 % topical foam, APPLY TO AFFECTED AREA OF SKIN UP TO TWICE DAILY FOR TWO WEEKS ON, AND ONE WEEK OFF, THEN AS NEEDED WITH FLARES, Disp: , Rfl:     losartan (COZAAR) 100 MG tablet, Take 1 tablet (100 mg total) by mouth daily, Disp: 90 tablet, Rfl: 0    omeprazole (PriLOSEC) 40 MG capsule, Take 40 mg by mouth daily Every other day, Disp: , Rfl:       Active Problems     Patient Active Problem List   Diagnosis    HTN (hypertension)    Hypercholesterolemia    Numerous moles    Gastroesophageal reflux disease without esophagitis    Acute left flank pain    BMI 28.0-28.9,adult    Nephrolithiasis    Personal history of malignant melanoma    Encounter for follow-up surveillance of melanoma         Past Medical History     Past Medical History:   Diagnosis Date    Allergic     Allergic rhinitis     Resolved 1/14/2016     Allergy     Mild; spring and fall    Cancer (HCC)     melanoma    Colon polyp     Diverticulitis     Erectile dysfunction of nonorganic origin     Resolved 1/14/2016     GERD (gastroesophageal reflux disease)     Hyperlipidemia     Hypertension     Inflammatory bowel disease 11/42021    Diverticulitis    Rosacea     Resolved 1/14/2016          Surgical History     Past Surgical History:   Procedure Laterality Date    COLONOSCOPY      FACIAL/NECK BIOPSY Right 01/09/2018    Procedure: CHEEK and LOWER EYELID BCC EXCISION AND COMPLEX CLOSURE;  Surgeon: Anoop Jensen MD;  Location: [facility];  Service: Plastics    FL INJECTION LEFT HIP (NON ARTHROGRAM)  02/07/2022    LYMPH NODE BIOPSY Left 02/15/2022    Procedure: BIOPSY LYMPH NODE SENTINEL (NUC MED INJECTION AT 1300);  Surgeon: Alessia Magallanes MD;  [facility];  Service: Surgical Oncology    SKIN BIOPSY      SKIN LESION EXCISION Left 02/15/2022    Procedure: UPPER BACK LESION WIDE EXCISION;  Surgeon: Alessia Magallanes MD;  Location[facility];  Service: Surgical Oncology    WISDOM TOOTH  "EXTRACTION           Family History     Family History   Problem Relation Age of Onset    Skin cancer Mother     No Known Problems Father          Social History     Social History     Social History     Tobacco Use   Smoking Status Never    Passive exposure: Yes   Smokeless Tobacco Never   Tobacco Comments    cigar 6 times a year         Pertinent Lab Values     Lab Results   Component Value Date    CREATININE 1.16 10/29/2024       Lab Results   Component Value Date    PSA 2.308 10/29/2024    PSA 0.87 06/15/2023    PSA 0.8 01/27/2022       @RESULTRCNT(1H])@      Pertinent Imaging       No results found.      Portions of the record may have been created with voice recognition software.  Occasional wrong word or \"sound a like\" substitutions may have occurred due to the inherent limitations of voice recognition software.  In addition some of the content generated from this outpatient encounter includes information designed for patient education and/or communication back to the referring provider.  Read the chart carefully and recognize, using context, where substitutions have occurred.    "

## 2025-01-07 ENCOUNTER — CONSULT (OUTPATIENT)
Dept: UROLOGY | Facility: CLINIC | Age: 70
End: 2025-01-07
Payer: MEDICARE

## 2025-01-07 VITALS
WEIGHT: 215 LBS | OXYGEN SATURATION: 99 % | DIASTOLIC BLOOD PRESSURE: 82 MMHG | HEART RATE: 80 BPM | HEIGHT: 72 IN | BODY MASS INDEX: 29.12 KG/M2 | SYSTOLIC BLOOD PRESSURE: 148 MMHG

## 2025-01-07 DIAGNOSIS — N20.0 LEFT NEPHROLITHIASIS: ICD-10-CM

## 2025-01-07 DIAGNOSIS — C43.59 MALIGNANT MELANOMA OF UPPER BACK (HCC): ICD-10-CM

## 2025-01-07 DIAGNOSIS — N20.0 NEPHROLITHIASIS: Primary | ICD-10-CM

## 2025-01-07 PROCEDURE — 99203 OFFICE O/P NEW LOW 30 MIN: CPT | Performed by: UROLOGY

## 2025-01-23 ENCOUNTER — RA CDI HCC (OUTPATIENT)
Dept: OTHER | Facility: HOSPITAL | Age: 70
End: 2025-01-23

## 2025-02-19 ENCOUNTER — APPOINTMENT (OUTPATIENT)
Dept: LAB | Age: 70
End: 2025-02-19
Payer: MEDICARE

## 2025-02-19 DIAGNOSIS — C43.59 MALIGNANT MELANOMA OF UPPER BACK (HCC): ICD-10-CM

## 2025-02-19 DIAGNOSIS — C43.59 MALIGNANT MELANOMA OF TORSO EXCLUDING BREAST (HCC): ICD-10-CM

## 2025-02-19 DIAGNOSIS — Z85.820 ENCOUNTER FOR FOLLOW-UP SURVEILLANCE OF MELANOMA: ICD-10-CM

## 2025-02-19 DIAGNOSIS — Z08 ENCOUNTER FOR FOLLOW-UP SURVEILLANCE OF MELANOMA: ICD-10-CM

## 2025-02-19 LAB
BASOPHILS # BLD AUTO: 0.03 THOUSANDS/ΜL (ref 0–0.1)
BASOPHILS NFR BLD AUTO: 1 % (ref 0–1)
BILIRUB UR QL STRIP: NEGATIVE
CLARITY UR: CLEAR
COLOR UR: NORMAL
EOSINOPHIL # BLD AUTO: 0.07 THOUSAND/ΜL (ref 0–0.61)
EOSINOPHIL NFR BLD AUTO: 1 % (ref 0–6)
ERYTHROCYTE [DISTWIDTH] IN BLOOD BY AUTOMATED COUNT: 12.5 % (ref 11.6–15.1)
GLUCOSE UR STRIP-MCNC: NEGATIVE MG/DL
HCT VFR BLD AUTO: 46.5 % (ref 36.5–49.3)
HGB BLD-MCNC: 15.6 G/DL (ref 12–17)
HGB UR QL STRIP.AUTO: NEGATIVE
IMM GRANULOCYTES # BLD AUTO: 0.02 THOUSAND/UL (ref 0–0.2)
IMM GRANULOCYTES NFR BLD AUTO: 0 % (ref 0–2)
KETONES UR STRIP-MCNC: NEGATIVE MG/DL
LDH SERPL-CCNC: 188 U/L (ref 140–271)
LEUKOCYTE ESTERASE UR QL STRIP: NEGATIVE
LYMPHOCYTES # BLD AUTO: 1.39 THOUSANDS/ΜL (ref 0.6–4.47)
LYMPHOCYTES NFR BLD AUTO: 26 % (ref 14–44)
MCH RBC QN AUTO: 31.2 PG (ref 26.8–34.3)
MCHC RBC AUTO-ENTMCNC: 33.5 G/DL (ref 31.4–37.4)
MCV RBC AUTO: 93 FL (ref 82–98)
MONOCYTES # BLD AUTO: 0.57 THOUSAND/ΜL (ref 0.17–1.22)
MONOCYTES NFR BLD AUTO: 11 % (ref 4–12)
NEUTROPHILS # BLD AUTO: 3.2 THOUSANDS/ΜL (ref 1.85–7.62)
NEUTS SEG NFR BLD AUTO: 61 % (ref 43–75)
NITRITE UR QL STRIP: NEGATIVE
NRBC BLD AUTO-RTO: 0 /100 WBCS
PH UR STRIP.AUTO: 6 [PH]
PLATELET # BLD AUTO: 202 THOUSANDS/UL (ref 149–390)
PMV BLD AUTO: 10.5 FL (ref 8.9–12.7)
PROT UR STRIP-MCNC: NEGATIVE MG/DL
RBC # BLD AUTO: 5 MILLION/UL (ref 3.88–5.62)
SP GR UR STRIP.AUTO: 1.02 (ref 1–1.03)
UROBILINOGEN UR STRIP-ACNC: <2 MG/DL
WBC # BLD AUTO: 5.28 THOUSAND/UL (ref 4.31–10.16)

## 2025-02-19 PROCEDURE — 36415 COLL VENOUS BLD VENIPUNCTURE: CPT

## 2025-02-19 PROCEDURE — 83615 LACTATE (LD) (LDH) ENZYME: CPT

## 2025-02-19 PROCEDURE — 85025 COMPLETE CBC W/AUTO DIFF WBC: CPT

## 2025-02-20 ENCOUNTER — OFFICE VISIT (OUTPATIENT)
Dept: HEMATOLOGY ONCOLOGY | Facility: CLINIC | Age: 70
End: 2025-02-20
Payer: MEDICARE

## 2025-02-20 VITALS
DIASTOLIC BLOOD PRESSURE: 82 MMHG | SYSTOLIC BLOOD PRESSURE: 122 MMHG | RESPIRATION RATE: 18 BRPM | HEART RATE: 66 BPM | BODY MASS INDEX: 29.39 KG/M2 | OXYGEN SATURATION: 97 % | TEMPERATURE: 97.7 F | HEIGHT: 72 IN | WEIGHT: 217 LBS

## 2025-02-20 DIAGNOSIS — Z85.820 ENCOUNTER FOR FOLLOW-UP SURVEILLANCE OF MELANOMA: Primary | ICD-10-CM

## 2025-02-20 DIAGNOSIS — C43.59 MALIGNANT MELANOMA OF TORSO EXCLUDING BREAST (HCC): ICD-10-CM

## 2025-02-20 DIAGNOSIS — Z08 ENCOUNTER FOR FOLLOW-UP SURVEILLANCE OF MELANOMA: Primary | ICD-10-CM

## 2025-02-20 PROCEDURE — 99213 OFFICE O/P EST LOW 20 MIN: CPT | Performed by: INTERNAL MEDICINE

## 2025-02-20 PROCEDURE — G2211 COMPLEX E/M VISIT ADD ON: HCPCS | Performed by: INTERNAL MEDICINE

## 2025-02-20 NOTE — LETTER
2025     Alessia Magallanes MD  [address]  [address]  [place] [address]    Patient: Andrea Quintero   YOB: 1955   Date of Visit: 2025       Dear Dr. Magallanes:    Thank you for referring Andrea Quintero to me for evaluation. Below are my notes for this consultation.    If you have questions, please do not hesitate to call me. I look forward to following your patient along with you.         Sincerely,        Erum Knight MD        CC: MD Kevin Quiroz Jr., MD Melissa A Wilson, MD  2025  8:25 AM  Sign when Signing Visit  Name: Andrea Quintero      : 1955      MRN: [id number]  Encounter Provider: Erum Knight MD  Encounter Date: 2025   Encounter department: [facility]  :  Assessment & Plan  Encounter for follow-up surveillance of melanoma  Mr. Quintero is a 70-year-old gentleman with stage Ib melanoma here for continued monitoring, follow-up and surveillance.  Clinically he is doing well with no evidence of recurrence.  Labs reviewed and okay.  He continues to follow closely with dermatology.  Follows with surgical oncology as well who obtains ultrasound imaging of lymph nodes.  He knows to continue to monitor for any new, changing, concerning skin lesions or lymphadenopathy.  He knows to call with issues or concerns prior to his next visit.   He will return in 6 months.  Orders placed for blood work to be done at that time.       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 Quintero is a 70-year-old gentleman with stage Ib melanoma here for continued monitoring, follow-up and surveillance.  Clinically he is doing well with no evidence of recurrence.  Labs reviewed and okay.  He continues to follow closely with dermatology.  Follows with surgical oncology as well who obtains ultrasound imaging of lymph nodes.  He knows to continue to monitor for any new, changing, concerning skin lesions or  lymphadenopathy.  He knows to call with issues or concerns prior to his next visit.   He will return in 6 months.  Orders placed for blood work to be done at that time.  Orders:  •  CBC and differential; Future  •  Comprehensive metabolic panel; Future  •  LD,Blood; Future        Return in about 6 months (around 8/20/2025) for Office Visit, labs.    History of Present Illness  Chief Complaint   Patient presents with   • Follow-up     Oncology History  Cancer Staging   Malignant melanoma of torso excluding breast (HCC)  Staging form: Melanoma of the Skin, AJCC 8th Edition  - Clinical stage from 12/20/2021: Stage IB (cT1b, cN0, cM0) - Signed by Erum Knight MD on 1/27/2022  Oncology History   Malignant melanoma of torso excluding breast (HCC)   12/20/2021 Biopsy    Left upper back Melamona: shave biopsy  Thickness: 0.8mm  Ulceration: none   Mitoses: 0  Margins: Approaches both lateral margins and extends to the deep margin      12/20/2021 -  Cancer Staged    Staging form: Melanoma of the Skin, AJCC 8th Edition  - Clinical stage from 12/20/2021: Stage IB (cT1b, cN0, cM0) - Signed by Erum Knight MD on 1/27/2022 1/17/2022 Initial Diagnosis    Malignant melanoma of upper back (HCC)     2/15/2022 Surgery    A. Lymph node, sentinel, left neck #1:  Two lymph nodes, negative for metastatic melanoma (0/2).    B. Lymph node, sentinel, left neck #2:  Two lymph nodes, negative for metastatic melanoma (0/2).    C. Skin and soft tissue, upper left back, wide excision: Scar.        Pertinent Medical History    02/20/25:   Mr. Quintero is a 70-year-old gentleman stage Ib melanoma here for continued monitoring, follow-up and surveillance.  He is doing well.  No issues concerns or complaints today.  Energy is good eating well.  Follows closely with Dr. Myron Parsons and saw him last month.  There was no concerning lesions on exam at that time.  He denies any new, changing, concerning skin lesions or lymphadenopathy today as  well.    Did have stone identified in the left kidney for which she followed up with urology.  They are monitoring at this time and has repeat imaging.  No symptoms or pain at this time.     Review of Systems   Constitutional:  Negative for activity change, chills, diaphoresis, fatigue, fever and unexpected weight change.   HENT:  Negative for congestion, hearing loss, trouble swallowing and voice change.    Respiratory:  Negative for cough, shortness of breath and wheezing.    Cardiovascular:  Negative for chest pain, palpitations and leg swelling.   Gastrointestinal:  Negative for abdominal distention, abdominal pain, constipation, diarrhea, nausea and vomiting.   Musculoskeletal:  Negative for arthralgias and myalgias.   Skin:  Negative for color change and rash.   Neurological:  Negative for dizziness, seizures, speech difficulty, weakness, light-headedness and headaches.   Hematological:  Negative for adenopathy.     Current Outpatient Medications on File Prior to Visit   Medication Sig Dispense Refill   • atorvastatin (LIPITOR) 20 mg tablet Take 1 tablet (20 mg total) by mouth daily 90 tablet 1   • clobetasol propionate (OLUX-E) 0.05 % topical foam APPLY TO AFFECTED AREA OF SKIN UP TO TWICE DAILY FOR TWO WEEKS ON, AND ONE WEEK OFF, THEN AS NEEDED WITH FLARES     • losartan (COZAAR) 100 MG tablet Take 1 tablet (100 mg total) by mouth daily 90 tablet 0   • omeprazole (PriLOSEC) 40 MG capsule Take 40 mg by mouth daily Every other day       No current facility-administered medications on file prior to visit.          Objective  /82 (BP Location: Left arm, Patient Position: Sitting, Cuff Size: Adult)   Pulse 66   Temp 97.7 °F (36.5 °C) (Temporal)   Resp 18   Ht 6' (1.829 m)   Wt 98.4 kg (217 lb)   SpO2 97%   BMI 29.43 kg/m²     Pain Screening:  Pain Score: 0-No pain  ECOG ECOG Performance Status: 0 - Fully active, able to carry on all pre-disease performance without restriction     Physical  Exam  Constitutional:       General: He is not in acute distress.     Appearance: Normal appearance. He is not ill-appearing.   HENT:      Head: Normocephalic and atraumatic.      Right Ear: External ear normal.      Left Ear: External ear normal.      Nose: Nose normal. No congestion.      Mouth/Throat:      Mouth: Mucous membranes are moist.      Pharynx: Oropharynx is clear. No oropharyngeal exudate or posterior oropharyngeal erythema.   Eyes:      General: No scleral icterus.        Right eye: No discharge.         Left eye: No discharge.      Conjunctiva/sclera: Conjunctivae normal.   Cardiovascular:      Rate and Rhythm: Normal rate and regular rhythm.      Pulses: Normal pulses.      Heart sounds: Normal heart sounds. No murmur heard.     No friction rub. No gallop.   Pulmonary:      Effort: Pulmonary effort is normal. No respiratory distress.      Breath sounds: Normal breath sounds. No wheezing or rales.   Abdominal:      General: Bowel sounds are normal. There is no distension.      Palpations: There is no mass.      Tenderness: There is no abdominal tenderness. There is no rebound.   Musculoskeletal:         General: No swelling or tenderness. Normal range of motion.      Cervical back: Normal range of motion. No rigidity.      Right lower leg: No edema.      Left lower leg: No edema.   Lymphadenopathy:      Head:      Right side of head: No submental, submandibular, preauricular or posterior auricular adenopathy.      Left side of head: No submental, submandibular, preauricular or posterior auricular adenopathy.      Cervical: No cervical adenopathy.      Right cervical: No superficial or posterior cervical adenopathy.     Left cervical: No superficial or posterior cervical adenopathy.      Upper Body:      Right upper body: No supraclavicular or axillary adenopathy.      Left upper body: No supraclavicular or axillary adenopathy.   Skin:     General: Skin is warm.      Coloration: Skin is not jaundiced.       Findings: No lesion or rash.   Neurological:      General: No focal deficit present.      Mental Status: He is alert and oriented to person, place, and time.      Cranial Nerves: No cranial nerve deficit.      Motor: No weakness.      Gait: Gait normal.   Psychiatric:         Mood and Affect: Mood normal.         Behavior: Behavior normal.         Thought Content: Thought content normal.         Judgment: Judgment normal.         Labs: I have reviewed the following labs:  Lab Results   Component Value Date/Time    WBC 5.28 02/19/2025 07:57 AM    RBC 5.00 02/19/2025 07:57 AM    Hemoglobin 15.6 02/19/2025 07:57 AM    Hematocrit 46.5 02/19/2025 07:57 AM    MCV 93 02/19/2025 07:57 AM    MCH 31.2 02/19/2025 07:57 AM    RDW 12.5 02/19/2025 07:57 AM    Platelets 202 02/19/2025 07:57 AM    Segmented % 61 02/19/2025 07:57 AM    Lymphocytes % 26 02/19/2025 07:57 AM    Monocytes % 11 02/19/2025 07:57 AM    Eosinophils Relative 1 02/19/2025 07:57 AM    Basophils Relative 1 02/19/2025 07:57 AM    Immature Grans % 0 02/19/2025 07:57 AM    Absolute Neutrophils 3.20 02/19/2025 07:57 AM     Lab Results   Component Value Date/Time    Potassium 3.7 10/29/2024 11:30 AM    Chloride 101 10/29/2024 11:30 AM    CO2 30 10/29/2024 11:30 AM    BUN 16 10/29/2024 11:30 AM    Creatinine 1.16 10/29/2024 11:30 AM    Glucose, Fasting 97 10/29/2024 11:30 AM    Calcium 9.4 10/29/2024 11:30 AM    AST 17 10/29/2024 11:30 AM    ALT 21 10/29/2024 11:30 AM    Alkaline Phosphatase 49 10/29/2024 11:30 AM    Total Protein 7.2 10/29/2024 11:30 AM    Albumin 4.4 10/29/2024 11:30 AM    Total Bilirubin 0.86 10/29/2024 11:30 AM    eGFR 63 10/29/2024 11:30 AM     Lab Results   Component Value Date/Time    TSH 3RD GENERATON 1.631 10/29/2024 11:30 AM     02/19/2025 07:57 AM        Radiology Results Review : No pertinent imaging studies reviewed.        Erum Knight MD, PhD

## 2025-02-20 NOTE — ASSESSMENT & PLAN NOTE
Mr. Quintero is a 70-year-old gentleman with stage Ib melanoma here for continued monitoring, follow-up and surveillance.  Clinically he is doing well with no evidence of recurrence.  Labs reviewed and okay.  He continues to follow closely with dermatology.  Follows with surgical oncology as well who obtains ultrasound imaging of lymph nodes.  He knows to continue to monitor for any new, changing, concerning skin lesions or lymphadenopathy.  He knows to call with issues or concerns prior to his next visit.   He will return in 6 months.  Orders placed for blood work to be done at that time.

## 2025-02-20 NOTE — PROGRESS NOTES
Name: Andrea Quintero      : 1955      MRN: [id number]  Encounter Provider: Erum Knight MD  Encounter Date: 2025   Encounter department: [facility]  :  Assessment & Plan  Encounter for follow-up surveillance of melanoma  Mr. Quintero is a 70-year-old gentleman with stage Ib melanoma here for continued monitoring, follow-up and surveillance.  Clinically he is doing well with no evidence of recurrence.  Labs reviewed and okay.  He continues to follow closely with dermatology.  Follows with surgical oncology as well who obtains ultrasound imaging of lymph nodes.  He knows to continue to monitor for any new, changing, concerning skin lesions or lymphadenopathy.  He knows to call with issues or concerns prior to his next visit.   He will return in 6 months.  Orders placed for blood work to be done at that time.       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 Quintero is a 70-year-old gentleman with stage Ib melanoma here for continued monitoring, follow-up and surveillance.  Clinically he is doing well with no evidence of recurrence.  Labs reviewed and okay.  He continues to follow closely with dermatology.  Follows with surgical oncology as well who obtains ultrasound imaging of lymph nodes.  He knows to continue to monitor for any new, changing, concerning skin lesions or lymphadenopathy.  He knows to call with issues or concerns prior to his next visit.   He will return in 6 months.  Orders placed for blood work to be done at that time.  Orders:    CBC and differential; Future    Comprehensive metabolic panel; Future    LD,Blood; Future        Return in about 6 months (around 2025) for Office Visit, labs.    History of Present Illness   Chief Complaint   Patient presents with    Follow-up     Oncology History   Cancer Staging   Malignant melanoma of torso excluding breast (HCC)  Staging form: Melanoma of the Skin, AJCC 8th Edition  - Clinical stage  from 12/20/2021: Stage IB (cT1b, cN0, cM0) - Signed by Erum Knight MD on 1/27/2022  Oncology History   Malignant melanoma of torso excluding breast (HCC)   12/20/2021 Biopsy    Left upper back Ashley: shave biopsy  Thickness: 0.8mm  Ulceration: none   Mitoses: 0  Margins: Approaches both lateral margins and extends to the deep margin      12/20/2021 -  Cancer Staged    Staging form: Melanoma of the Skin, AJCC 8th Edition  - Clinical stage from 12/20/2021: Stage IB (cT1b, cN0, cM0) - Signed by Erum Knight MD on 1/27/2022 1/17/2022 Initial Diagnosis    Malignant melanoma of upper back (HCC)     2/15/2022 Surgery    A. Lymph node, sentinel, left neck #1:  Two lymph nodes, negative for metastatic melanoma (0/2).    B. Lymph node, sentinel, left neck #2:  Two lymph nodes, negative for metastatic melanoma (0/2).    C. Skin and soft tissue, upper left back, wide excision: Scar.        Pertinent Medical History     02/20/25:   Mr. Quintero is a 70-year-old gentleman stage Ib melanoma here for continued monitoring, follow-up and surveillance.  He is doing well.  No issues concerns or complaints today.  Energy is good eating well.  Follows closely with Dr. Myron Parsons and saw him last month.  There was no concerning lesions on exam at that time.  He denies any new, changing, concerning skin lesions or lymphadenopathy today as well.    Did have stone identified in the left kidney for which she followed up with urology.  They are monitoring at this time and has repeat imaging.  No symptoms or pain at this time.     Review of Systems   Constitutional:  Negative for activity change, chills, diaphoresis, fatigue, fever and unexpected weight change.   HENT:  Negative for congestion, hearing loss, trouble swallowing and voice change.    Respiratory:  Negative for cough, shortness of breath and wheezing.    Cardiovascular:  Negative for chest pain, palpitations and leg swelling.   Gastrointestinal:  Negative for  abdominal distention, abdominal pain, constipation, diarrhea, nausea and vomiting.   Musculoskeletal:  Negative for arthralgias and myalgias.   Skin:  Negative for color change and rash.   Neurological:  Negative for dizziness, seizures, speech difficulty, weakness, light-headedness and headaches.   Hematological:  Negative for adenopathy.     Current Outpatient Medications on File Prior to Visit   Medication Sig Dispense Refill    atorvastatin (LIPITOR) 20 mg tablet Take 1 tablet (20 mg total) by mouth daily 90 tablet 1    clobetasol propionate (OLUX-E) 0.05 % topical foam APPLY TO AFFECTED AREA OF SKIN UP TO TWICE DAILY FOR TWO WEEKS ON, AND ONE WEEK OFF, THEN AS NEEDED WITH FLARES      losartan (COZAAR) 100 MG tablet Take 1 tablet (100 mg total) by mouth daily 90 tablet 0    omeprazole (PriLOSEC) 40 MG capsule Take 40 mg by mouth daily Every other day       No current facility-administered medications on file prior to visit.          Objective   /82 (BP Location: Left arm, Patient Position: Sitting, Cuff Size: Adult)   Pulse 66   Temp 97.7 °F (36.5 °C) (Temporal)   Resp 18   Ht 6' (1.829 m)   Wt 98.4 kg (217 lb)   SpO2 97%   BMI 29.43 kg/m²     Pain Screening:  Pain Score: 0-No pain  ECOG ECOG Performance Status: 0 - Fully active, able to carry on all pre-disease performance without restriction     Physical Exam  Constitutional:       General: He is not in acute distress.     Appearance: Normal appearance. He is not ill-appearing.   HENT:      Head: Normocephalic and atraumatic.      Right Ear: External ear normal.      Left Ear: External ear normal.      Nose: Nose normal. No congestion.      Mouth/Throat:      Mouth: Mucous membranes are moist.      Pharynx: Oropharynx is clear. No oropharyngeal exudate or posterior oropharyngeal erythema.   Eyes:      General: No scleral icterus.        Right eye: No discharge.         Left eye: No discharge.      Conjunctiva/sclera: Conjunctivae normal.    Cardiovascular:      Rate and Rhythm: Normal rate and regular rhythm.      Pulses: Normal pulses.      Heart sounds: Normal heart sounds. No murmur heard.     No friction rub. No gallop.   Pulmonary:      Effort: Pulmonary effort is normal. No respiratory distress.      Breath sounds: Normal breath sounds. No wheezing or rales.   Abdominal:      General: Bowel sounds are normal. There is no distension.      Palpations: There is no mass.      Tenderness: There is no abdominal tenderness. There is no rebound.   Musculoskeletal:         General: No swelling or tenderness. Normal range of motion.      Cervical back: Normal range of motion. No rigidity.      Right lower leg: No edema.      Left lower leg: No edema.   Lymphadenopathy:      Head:      Right side of head: No submental, submandibular, preauricular or posterior auricular adenopathy.      Left side of head: No submental, submandibular, preauricular or posterior auricular adenopathy.      Cervical: No cervical adenopathy.      Right cervical: No superficial or posterior cervical adenopathy.     Left cervical: No superficial or posterior cervical adenopathy.      Upper Body:      Right upper body: No supraclavicular or axillary adenopathy.      Left upper body: No supraclavicular or axillary adenopathy.   Skin:     General: Skin is warm.      Coloration: Skin is not jaundiced.      Findings: No lesion or rash.   Neurological:      General: No focal deficit present.      Mental Status: He is alert and oriented to person, place, and time.      Cranial Nerves: No cranial nerve deficit.      Motor: No weakness.      Gait: Gait normal.   Psychiatric:         Mood and Affect: Mood normal.         Behavior: Behavior normal.         Thought Content: Thought content normal.         Judgment: Judgment normal.         Labs: I have reviewed the following labs:  Lab Results   Component Value Date/Time    WBC 5.28 02/19/2025 07:57 AM    RBC 5.00 02/19/2025 07:57 AM     Hemoglobin 15.6 02/19/2025 07:57 AM    Hematocrit 46.5 02/19/2025 07:57 AM    MCV 93 02/19/2025 07:57 AM    MCH 31.2 02/19/2025 07:57 AM    RDW 12.5 02/19/2025 07:57 AM    Platelets 202 02/19/2025 07:57 AM    Segmented % 61 02/19/2025 07:57 AM    Lymphocytes % 26 02/19/2025 07:57 AM    Monocytes % 11 02/19/2025 07:57 AM    Eosinophils Relative 1 02/19/2025 07:57 AM    Basophils Relative 1 02/19/2025 07:57 AM    Immature Grans % 0 02/19/2025 07:57 AM    Absolute Neutrophils 3.20 02/19/2025 07:57 AM     Lab Results   Component Value Date/Time    Potassium 3.7 10/29/2024 11:30 AM    Chloride 101 10/29/2024 11:30 AM    CO2 30 10/29/2024 11:30 AM    BUN 16 10/29/2024 11:30 AM    Creatinine 1.16 10/29/2024 11:30 AM    Glucose, Fasting 97 10/29/2024 11:30 AM    Calcium 9.4 10/29/2024 11:30 AM    AST 17 10/29/2024 11:30 AM    ALT 21 10/29/2024 11:30 AM    Alkaline Phosphatase 49 10/29/2024 11:30 AM    Total Protein 7.2 10/29/2024 11:30 AM    Albumin 4.4 10/29/2024 11:30 AM    Total Bilirubin 0.86 10/29/2024 11:30 AM    eGFR 63 10/29/2024 11:30 AM     Lab Results   Component Value Date/Time    TSH 3RD GENERATON 1.631 10/29/2024 11:30 AM     02/19/2025 07:57 AM        Radiology Results Review : No pertinent imaging studies reviewed.        Erum Knight MD, PhD

## 2025-02-20 NOTE — ASSESSMENT & PLAN NOTE
Mr. Quintero is a 70-year-old gentleman with stage Ib melanoma here for continued monitoring, follow-up and surveillance.  Clinically he is doing well with no evidence of recurrence.  Labs reviewed and okay.  He continues to follow closely with dermatology.  Follows with surgical oncology as well who obtains ultrasound imaging of lymph nodes.  He knows to continue to monitor for any new, changing, concerning skin lesions or lymphadenopathy.  He knows to call with issues or concerns prior to his next visit.   He will return in 6 months.  Orders placed for blood work to be done at that time.  Orders:    CBC and differential; Future    Comprehensive metabolic panel; Future    LD,Blood; Future

## 2025-03-06 ENCOUNTER — OFFICE VISIT (OUTPATIENT)
Dept: INTERNAL MEDICINE CLINIC | Age: 70
End: 2025-03-06
Payer: MEDICARE

## 2025-03-06 VITALS
HEIGHT: 72 IN | TEMPERATURE: 97.6 F | DIASTOLIC BLOOD PRESSURE: 78 MMHG | OXYGEN SATURATION: 97 % | BODY MASS INDEX: 28.71 KG/M2 | WEIGHT: 212 LBS | HEART RATE: 84 BPM | SYSTOLIC BLOOD PRESSURE: 126 MMHG

## 2025-03-06 DIAGNOSIS — J34.89 RHINORRHEA: ICD-10-CM

## 2025-03-06 DIAGNOSIS — E78.2 MIXED HYPERLIPIDEMIA: ICD-10-CM

## 2025-03-06 DIAGNOSIS — I10 ESSENTIAL HYPERTENSION: ICD-10-CM

## 2025-03-06 DIAGNOSIS — Z13.6 ENCOUNTER FOR ABDOMINAL AORTIC ANEURYSM (AAA) SCREENING: Primary | ICD-10-CM

## 2025-03-06 DIAGNOSIS — G47.09 OTHER INSOMNIA: ICD-10-CM

## 2025-03-06 PROCEDURE — 99214 OFFICE O/P EST MOD 30 MIN: CPT | Performed by: INTERNAL MEDICINE

## 2025-03-06 PROCEDURE — G0439 PPPS, SUBSEQ VISIT: HCPCS | Performed by: INTERNAL MEDICINE

## 2025-03-06 RX ORDER — IPRATROPIUM BROMIDE 21 UG/1
2 SPRAY, METERED NASAL EVERY 12 HOURS
Qty: 30 ML | Refills: 2 | Status: SHIPPED | OUTPATIENT
Start: 2025-03-06

## 2025-03-06 RX ORDER — ATORVASTATIN CALCIUM 20 MG/1
20 TABLET, FILM COATED ORAL DAILY
Qty: 90 TABLET | Refills: 1 | Status: SHIPPED | OUTPATIENT
Start: 2025-03-06

## 2025-03-06 RX ORDER — LOSARTAN POTASSIUM 100 MG/1
100 TABLET ORAL DAILY
Qty: 90 TABLET | Refills: 1 | Status: SHIPPED | OUTPATIENT
Start: 2025-03-06

## 2025-03-06 NOTE — PATIENT INSTRUCTIONS
Medicare Preventive Visit Patient Instructions  Thank you for completing your Welcome to Medicare Visit or Medicare Annual Wellness Visit today. Your next wellness visit will be due in one year (3/7/2026).  The screening/preventive services that you may require over the next 5-10 years are detailed below. Some tests may not apply to you based off risk factors and/or age. Screening tests ordered at today's visit but not completed yet may show as past due. Also, please note that scanned in results may not display below.  Preventive Screenings:  Service Recommendations Previous Testing/Comments   Colorectal Cancer Screening  Colonoscopy    Fecal Occult Blood Test (FOBT)/Fecal Immunochemical Test (FIT)  Fecal DNA/Cologuard Test  Flexible Sigmoidoscopy Age: 45-75 years old   Colonoscopy: every 10 years (May be performed more frequently if at higher risk)  OR  FOBT/FIT: every 1 year  OR  Cologuard: every 3 years  OR  Sigmoidoscopy: every 5 years  Screening may be recommended earlier than age 45 if at higher risk for colorectal cancer. Also, an individualized decision between you and your healthcare provider will decide whether screening between the ages of 76-85 would be appropriate. Colonoscopy: 01/13/2023  FOBT/FIT: Not on file  Cologuard: Not on file  Sigmoidoscopy: Not on file    Screening Current     Prostate Cancer Screening Individualized decision between patient and health care provider in men between ages of 55-69   Medicare will cover every 12 months beginning on the day after your 50th birthday PSA: 2.308 ng/mL     Screening Current     Hepatitis C Screening Once for adults born between 1945 and 1965  More frequently in patients at high risk for Hepatitis C Hep C Antibody: 06/15/2023    Screening Current   Diabetes Screening 1-2 times per year if you're at risk for diabetes or have pre-diabetes Fasting glucose: 97 mg/dL (10/29/2024)  A1C: 5.3 % (1/10/2024)  Screening Current   Cholesterol Screening Once every 5  years if you don't have a lipid disorder. May order more often based on risk factors. Lipid panel: 10/29/2024  Screening Not Indicated  History Lipid Disorder      Other Preventive Screenings Covered by Medicare:  Abdominal Aortic Aneurysm (AAA) Screening: covered once if your at risk. You're considered to be at risk if you have a family history of AAA or a male between the age of 65-75 who smoking at least 100 cigarettes in your lifetime.  Lung Cancer Screening: covers low dose CT scan once per year if you meet all of the following conditions: (1) Age 55-77; (2) No signs or symptoms of lung cancer; (3) Current smoker or have quit smoking within the last 15 years; (4) You have a tobacco smoking history of at least 20 pack years (packs per day x number of years you smoked); (5) You get a written order from a healthcare provider.  Glaucoma Screening: covered annually if you're considered high risk: (1) You have diabetes OR (2) Family history of glaucoma OR (3)  aged 50 and older OR (4)  American aged 65 and older  Osteoporosis Screening: covered every 2 years if you meet one of the following conditions: (1) Have a vertebral abnormality; (2) On glucocorticoid therapy for more than 3 months; (3) Have primary hyperparathyroidism; (4) On osteoporosis medications and need to assess response to drug therapy.  HIV Screening: covered annually if you're between the age of 15-65. Also covered annually if you are younger than 15 and older than 65 with risk factors for HIV infection. For pregnant patients, it is covered up to 3 times per pregnancy.    Immunizations:  Immunization Recommendations   Influenza Vaccine Annual influenza vaccination during flu season is recommended for all persons aged >= 6 months who do not have contraindications   Pneumococcal Vaccine   * Pneumococcal conjugate vaccine = PCV13 (Prevnar 13), PCV15 (Vaxneuvance), PCV20 (Prevnar 20)  * Pneumococcal polysaccharide vaccine = PPSV23  (Pneumovax) Adults 19-65 yo with certain risk factors or if 65+ yo  If never received any pneumonia vaccine: recommend Prevnar 20 (PCV20)  Give PCV20 if previously received 1 dose of PCV13 or PPSV23   Hepatitis B Vaccine 3 dose series if at intermediate or high risk (ex: diabetes, end stage renal disease, liver disease)   Respiratory syncytial virus (RSV) Vaccine - COVERED BY MEDICARE PART D  * RSVPreF3 (Arexvy) CDC recommends that adults 60 years of age and older may receive a single dose of RSV vaccine using shared clinical decision-making (SCDM)   Tetanus (Td) Vaccine - COST NOT COVERED BY MEDICARE PART B Following completion of primary series, a booster dose should be given every 10 years to maintain immunity against tetanus. Td may also be given as tetanus wound prophylaxis.   Tdap Vaccine - COST NOT COVERED BY MEDICARE PART B Recommended at least once for all adults. For pregnant patients, recommended with each pregnancy.   Shingles Vaccine (Shingrix) - COST NOT COVERED BY MEDICARE PART B  2 shot series recommended in those 19 years and older who have or will have weakened immune systems or those 50 years and older     Health Maintenance Due:      Topic Date Due   • Colorectal Cancer Screening  01/12/2028   • Hepatitis C Screening  Completed     Immunizations Due:      Topic Date Due   • Pneumococcal Vaccine: 65+ Years (1 of 1 - PCV) Never done   • Influenza Vaccine (1) 09/01/2024   • COVID-19 Vaccine (4 - 2024-25 season) 09/01/2024     Advance Directives   What are advance directives?  Advance directives are legal documents that state your wishes and plans for medical care. These plans are made ahead of time in case you lose your ability to make decisions for yourself. Advance directives can apply to any medical decision, such as the treatments you want, and if you want to donate organs.   What are the types of advance directives?  There are many types of advance directives, and each state has rules about how  to use them. You may choose a combination of any of the following:  Living will:  This is a written record of the treatment you want. You can also choose which treatments you do not want, which to limit, and which to stop at a certain time. This includes surgery, medicine, IV fluid, and tube feedings.   Durable power of  for healthcare (DPAHC):  This is a written record that states who you want to make healthcare choices for you when you are unable to make them for yourself. This person, called a proxy, is usually a family member or a friend. You may choose more than 1 proxy.  Do not resuscitate (DNR) order:  A DNR order is used in case your heart stops beating or you stop breathing. It is a request not to have certain forms of treatment, such as CPR. A DNR order may be included in other types of advance directives.  Medical directive:  This covers the care that you want if you are in a coma, near death, or unable to make decisions for yourself. You can list the treatments you want for each condition. Treatment may include pain medicine, surgery, blood transfusions, dialysis, IV or tube feedings, and a ventilator (breathing machine).  Values history:  This document has questions about your views, beliefs, and how you feel and think about life. This information can help others choose the care that you would choose.  Why are advance directives important?  An advance directive helps you control your care. Although spoken wishes may be used, it is better to have your wishes written down. Spoken wishes can be misunderstood, or not followed. Treatments may be given even if you do not want them. An advance directive may make it easier for your family to make difficult choices about your care.   Weight Management   Why it is important to manage your weight:  Being overweight increases your risk of health conditions such as heart disease, high blood pressure, type 2 diabetes, and certain types of cancer. It can also  increase your risk for osteoarthritis, sleep apnea, and other respiratory problems. Aim for a slow, steady weight loss. Even a small amount of weight loss can lower your risk of health problems.  How to lose weight safely:  A safe and healthy way to lose weight is to eat fewer calories and get regular exercise. You can lose up about 1 pound a week by decreasing the number of calories you eat by 500 calories each day.   Healthy meal plan for weight management:  A healthy meal plan includes a variety of foods, contains fewer calories, and helps you stay healthy. A healthy meal plan includes the following:  Eat whole-grain foods more often.  A healthy meal plan should contain fiber. Fiber is the part of grains, fruits, and vegetables that is not broken down by your body. Whole-grain foods are healthy and provide extra fiber in your diet. Some examples of whole-grain foods are whole-wheat breads and pastas, oatmeal, brown rice, and bulgur.  Eat a variety of vegetables every day.  Include dark, leafy greens such as spinach, kale, kayla greens, and mustard greens. Eat yellow and orange vegetables such as carrots, sweet potatoes, and winter squash.   Eat a variety of fruits every day.  Choose fresh or canned fruit (canned in its own juice or light syrup) instead of juice. Fruit juice has very little or no fiber.  Eat low-fat dairy foods.  Drink fat-free (skim) milk or 1% milk. Eat fat-free yogurt and low-fat cottage cheese. Try low-fat cheeses such as mozzarella and other reduced-fat cheeses.  Choose meat and other protein foods that are low in fat.  Choose beans or other legumes such as split peas or lentils. Choose fish, skinless poultry (chicken or turkey), or lean cuts of red meat (beef or pork). Before you cook meat or poultry, cut off any visible fat.   Use less fat and oil.  Try baking foods instead of frying them. Add less fat, such as margarine, sour cream, regular salad dressing and mayonnaise to foods. Eat  "fewer high-fat foods. Some examples of high-fat foods include french fries, doughnuts, ice cream, and cakes.  Eat fewer sweets.  Limit foods and drinks that are high in sugar. This includes candy, cookies, regular soda, and sweetened drinks.  Exercise:  Exercise at least 30 minutes per day on most days of the week. Some examples of exercise include walking, biking, dancing, and swimming. You can also fit in more physical activity by taking the stairs instead of the elevator or parking farther away from stores. Ask your healthcare provider about the best exercise plan for you.   Alcohol Use and Your Health    Drinking too much can harm your health.  Excessive alcohol use leads to about 88,000 death in the [place] each year, and shortens the life of those who diet by almost 30 years.  Further, excessive drinking cost the economy $249 billion in 2010.  Most excessive drinkers are not alcohol dependent.    Excessive alcohol use has immediate effects that increase the risk of many harmful health conditions.  These are most often the result of binge drinking.  Over time, excessive alcohol use can lead to the development of chronic diseases and other series health problems.    What is considered a \"drink\"?        Excessive alcohol use includes:  Binge Drinking: For women, 4 or more drinks consumed on one occasion. For men, 5 or more drinks consumed on one occasion.  Heavy Drinking: For women, 8 or more drinks per week. For men, 15 or more drinks per week  Any alcohol used by pregnant women  Any alcohol used by those under the age of 21 years    If you choose to drink, do so in moderation:  Do not drink at all if you are under the age of 21, or if you are or may be pregnant, or have health problems that could be made worse by drinking.  For women, up to 1 drink per day  For men, up to 2 drinks a day    No one should begin drinking or drink more frequently based on potential health benefits    Short-Term Health " Risks:  Injuries: motor vehicle crashes, falls, drownings, burns  Violence: homicide, suicide, sexual assault, intimate partner violence  Alcohol poisoning  Reproductive health: risky sexual behaviors, unintended prengnacy, sexually transmitted diseases, miscarriage, stillbirth, fetal alcohol syndrome    Long-Term Health Risks:  Chronic diseases: high blood pressure, heart disease, stroke, liver disease, digestive problems  Cancers: breast, mouth and throat, liver, colon  Learning and memory problems: dementia, poor school performance  Mental health: depression, anxiety, insomnia  Social problems: lost productivity, family problems, unemployment  Alcohol dependence    For support and more information:  Substance Abuse and Mental Health Services Administration  [address]  [place] [address]  Web Address: http://www.samhsa.gov    Alcoholics Anonymous        Web Address: http://www.aa.org    https://www.cdc.gov/alcohol/fact-sheets/alcohol-use.htm     © Copyright [employer] 2018 Information is for End User's use only and may not be sold, redistributed or otherwise used for commercial purposes. All illustrations and images included in CareNotes® are the copyrighted property of [employer].D[employer]M., Inc[employer] or [employer]

## 2025-03-06 NOTE — ASSESSMENT & PLAN NOTE
Insomnia mostly maintenance of sleep is abnormal I discussed with him and I will ask him to take melatonin over-the-counter and to maintain the sleep hygiene

## 2025-03-06 NOTE — PROGRESS NOTES
Name: Andrea Quintero      : 1955      MRN: [id number]  Encounter Provider: Sudhir Rai MD  Encounter Date: 3/6/2025   Encounter department: [facility]    Assessment & Plan  Essential hypertension   is very well-controlled  Orders:    losartan (COZAAR) 100 MG tablet; Take 1 tablet (100 mg total) by mouth daily    Comprehensive metabolic panel; Future    Mixed hyperlipidemia  Will follow-up with the lipid panel in next 6 months  Orders:    atorvastatin (LIPITOR) 20 mg tablet; Take 1 tablet (20 mg total) by mouth daily    Lipid panel; Future    Encounter for abdominal aortic aneurysm (AAA) screening    Orders:    US abdominal aorta; Future  Medicare wellness subsequent visit done patient is up to date on his preventative care  Other insomnia  Insomnia mostly maintenance of sleep is abnormal I discussed with him and I will ask him to take melatonin over-the-counter and to maintain the sleep hygiene       Rhinorrhea  During eating or exposure to the cold  Orders:    ipratropium (ATROVENT) 0.03 % nasal spray; 2 sprays into each nostril every 12 (twelve) hours       Preventive health issues were discussed with patient, and age appropriate screening tests were ordered as noted in patient's After Visit Summary. Personalized health advice and appropriate referrals for health education or preventive services given if needed, as noted in patient's After Visit Summary.    History of Present Illness     Very pleasant 70 years young gentleman who is here today for the regular follow-up and also for the Medicare subsequent visit patient is doing well review of system is essentially unremarkable except for the rhinorrhea and maintenance of the sleep as given above    Patient is here today for the follow-up.   Hypertension. I reviewed antihypertensive medication, patient does not have any side effects of  medications, no signs or symptoms of hypertension ,hypotension or orthostatic  hypotension.  Patient is compliant with medications.  Blood workup related to hypertensive diagnosis reviewed.  Plan is to continue with the present management.  We will follow-up as a scheduled and adjust the doses of the medication as indicated.    Hypercholesterolemia reviewed the lipid panel patient is on statin therapy.  Side effects of statin medications.  Patient is taking his medications  regularly.  Comorbidities include hypertension, diabetes, coronary artery disease.  plan is to continue with the present management continue to follow-up with the lipid panel    Rhinorrhea no blood in the discharge and also no infection or purulent discharge it is watery discharge when he eats or drink or exposure to the cold    History of melanoma followed up by the oncology       Patient Care Team:  Sudhir Rai MD as PCP - General  RODNEY Hammonds MD (Surgical Oncology)  Kevin Lisa Jr., MD (Dermatology)  Erum Knight MD (Oncology)    Review of Systems   Constitutional:  Negative for appetite change, fatigue and fever.   HENT:  Positive for rhinorrhea (Rhinorrhea during eating especially and during the cold weather). Negative for congestion, ear pain, hearing loss, nosebleeds, sneezing, tinnitus and voice change.    Eyes:  Negative for pain, discharge and redness.   Respiratory:  Negative for cough, chest tightness and wheezing.    Cardiovascular:  Negative for chest pain, palpitations and leg swelling.   Gastrointestinal:  Negative for abdominal pain, blood in stool, constipation, diarrhea, nausea and vomiting.   Genitourinary:  Negative for difficulty urinating, dysuria, hematuria and urgency.   Musculoskeletal:  Negative for arthralgias, back pain, gait problem and joint swelling.   Skin:  Negative for rash and wound.   Allergic/Immunologic: Negative for environmental allergies.   Neurological:  Negative for dizziness, tremors, seizures, weakness, light-headedness and  numbness.   Hematological:  Negative for adenopathy. Does not bruise/bleed easily.   Psychiatric/Behavioral:  Positive for sleep disturbance (Initiation of sleep is good but maintenance is affected several time awakening during the night). Negative for behavioral problems and confusion. The patient is not nervous/anxious.      Medical History Reviewed by provider this encounter:       Annual Wellness Visit Questionnaire   Andrea is here for his Subsequent Wellness visit. Last Medicare Wellness visit information reviewed, patient interviewed and updates made to the record today.      Health Risk Assessment:   Patient rates overall health as very good. Patient feels that their physical health rating is same. Patient is very satisfied with their life. Eyesight was rated as same. Hearing was rated as same. Patient feels that their emotional and mental health rating is same. Patients states they are never, rarely angry. Patient states they are sometimes unusually tired/fatigued. Pain experienced in the last 7 days has been none. Patient states that he has experienced weight loss or gain in last 6 months.     Depression Screening:   PHQ-2 Score: 0      Fall Risk Screening:   In the past year, patient has experienced: no history of falling in past year      Home Safety:  Patient does not have trouble with stairs inside or outside of their home. Patient has working smoke alarms and has working carbon monoxide detector. Home safety hazards include: none.     Nutrition:   Current diet is Regular and No Added Salt.     Medications:   Patient is not currently taking any over-the-counter supplements. Patient is able to manage medications.     Activities of Daily Living (ADLs)/Instrumental Activities of Daily Living (IADLs):   Walk and transfer into and out of bed and chair?: Yes  Dress and groom yourself?: Yes    Bathe or shower yourself?: Yes    Feed yourself? Yes  Do your laundry/housekeeping?: Yes  Manage your money, pay your  bills and track your expenses?: Yes  Make your own meals?: Yes    Do your own shopping?: Yes    Previous Hospitalizations:   Any hospitalizations or ED visits within the last 12 months?: No      Advance Care Planning:   Living will: No    Durable POA for healthcare: No    Advanced directive: No    Advanced directive counseling given: Yes      PREVENTIVE SCREENINGS      Cardiovascular Screening:    General: Screening Not Indicated and History Lipid Disorder      Diabetes Screening:     General: Screening Current      Colorectal Cancer Screening:     General: Screening Current      Prostate Cancer Screening:    General: Screening Current      Osteoporosis Screening:    General: Screening Not Indicated and Risks and Benefits Discussed      Abdominal Aortic Aneurysm (AAA) Screening:    Risk factors include: age between 65-74 yo        General: Screening Current    Due for: Screening AAA Ultrasound      Lung Cancer Screening:     General: Screening Not Indicated      Hepatitis C Screening:    General: Screening Current    Screening, Brief Intervention, and Referral to Treatment (SBIRT)     Screening  Typical number of drinks in a day: 2  Typical number of drinks in a week: 10  Interpretation: Low risk drinking behavior.    AUDIT-C Screenin) How often did you have a drink containing alcohol in the past year? 2 to 3 times a week  2) How many drinks did you have on a typical day when you were drinking in the past year? 1 to 2  3) How often did you have 6 or more drinks on one occasion in the past year? less than monthly    AUDIT-C Score: 4  Interpretation: Score 4-12 (male): POSITIVE screen for alcohol misuse    AUDIT Screenin) How often during the last year have you found that you were not able to stop drinking once you had started? 0 - never  5) How often during the last year have you failed to do what was normally expected from you because of drinking? 0 - never  6) How often during the last year have you  needed a first drink in the morning to get yourself going after a heavy drinking session? 0 - never  7) How often during the last year have you had a feeling of guilt or remorse after drinking? 1 - less than monthly  8) How often during the last year have you been unable to remember what happened the night before because you had been drinking? 0 - never  9) Have you or someone else been injured as a result of your drinking? 0 - no  10) Has a relative or friend or a doctor or another health worker been concerned about your drinking or suggested you cut down? 2 - yes, but not in the last year    AUDIT Score: 7  Interpretation: Low risk alcohol consumption    Single Item Drug Screening:  How often have you used an illegal drug (including marijuana) or a prescription medication for non-medical reasons in the past year? never    Single Item Drug Screen Score: 0  Interpretation: Negative screen for possible drug use disorder    Other Counseling Topics:   Car/seat belt/driving safety and calcium and vitamin D intake and regular weightbearing exercise.     Social Drivers of Health     Financial Resource Strain: Low Risk  (1/29/2024)    Overall Financial Resource Strain (CARDIA)     Difficulty of Paying Living Expenses: Not hard at all   Food Insecurity: No Food Insecurity (3/5/2025)    Hunger Vital Sign     Worried About Running Out of Food in the Last Year: Never true     Ran Out of Food in the Last Year: Never true   Transportation Needs: No Transportation Needs (3/5/2025)    PRAPARE - Transportation     Lack of Transportation (Medical): No     Lack of Transportation (Non-Medical): No   Housing Stability: Low Risk  (3/5/2025)    Housing Stability Vital Sign     Unable to Pay for Housing in the Last Year: No     Number of Times Moved in the Last Year: 0     Homeless in the Last Year: No   Utilities: Not At Risk (3/5/2025)    [facility] Utilities     Threatened with loss of utilities: No     No results found.    Objective   Ht 6'  (1.829 m)   Wt 96.2 kg (212 lb) Comment: pt verbal  BMI 28.75 kg/m²     Physical Exam  Constitutional:       Appearance: He is well-developed.   HENT:      Right Ear: Tympanic membrane and external ear normal.      Left Ear: Tympanic membrane normal.   Eyes:      Conjunctiva/sclera: Conjunctivae normal.      Pupils: Pupils are equal, round, and reactive to light.   Neck:      Thyroid: No thyromegaly.      Vascular: No JVD.   Cardiovascular:      Rate and Rhythm: Normal rate and regular rhythm.      Pulses: Normal pulses.      Heart sounds: Normal heart sounds.   Pulmonary:      Breath sounds: Normal breath sounds.   Abdominal:      General: Bowel sounds are normal.      Palpations: Abdomen is soft.   Musculoskeletal:         General: Normal range of motion.      Cervical back: Normal range of motion.   Lymphadenopathy:      Cervical: No cervical adenopathy.   Skin:     General: Skin is dry.   Neurological:      General: No focal deficit present.      Mental Status: He is alert and oriented to person, place, and time. Mental status is at baseline.      Deep Tendon Reflexes: Reflexes are normal and symmetric.   Psychiatric:         Mood and Affect: Mood normal.         Behavior: Behavior normal.

## 2025-03-19 ENCOUNTER — TELEPHONE (OUTPATIENT)
Dept: SURGICAL ONCOLOGY | Facility: CLINIC | Age: 70
End: 2025-03-19

## 2025-03-19 NOTE — TELEPHONE ENCOUNTER
Called patient and left message to reschedule appointment with Dr. Magallanes on 6/17/25 to another day due to Dr. Magallanes out of office. Patient will need his follow up to be after his ultrasound to review results at the appointment with Dr. Magallanes.

## 2025-04-11 DIAGNOSIS — R35.0 URINARY FREQUENCY: Primary | ICD-10-CM

## 2025-04-11 RX ORDER — TAMSULOSIN HYDROCHLORIDE 0.4 MG/1
0.4 CAPSULE ORAL
Qty: 90 CAPSULE | Refills: 0 | Status: SHIPPED | OUTPATIENT
Start: 2025-04-11

## 2025-04-17 ENCOUNTER — OFFICE VISIT (OUTPATIENT)
Dept: UROLOGY | Facility: CLINIC | Age: 70
End: 2025-04-17
Payer: MEDICARE

## 2025-04-17 VITALS
DIASTOLIC BLOOD PRESSURE: 80 MMHG | BODY MASS INDEX: 29.57 KG/M2 | SYSTOLIC BLOOD PRESSURE: 124 MMHG | WEIGHT: 218 LBS | HEART RATE: 107 BPM | OXYGEN SATURATION: 97 %

## 2025-04-17 DIAGNOSIS — N20.0 LEFT NEPHROLITHIASIS: Primary | ICD-10-CM

## 2025-04-17 DIAGNOSIS — Z12.5 SCREENING FOR PROSTATE CANCER: ICD-10-CM

## 2025-04-17 PROCEDURE — 99213 OFFICE O/P EST LOW 20 MIN: CPT

## 2025-04-17 NOTE — PROGRESS NOTES
Name: Andrea Quintero      : 1955      MRN: [id number]  Encounter Provider: CHERI Gao  Encounter Date: 2025   Encounter department: [facility]  :  Assessment & Plan  Left nephrolithiasis  - CT abdomen pelvis without contrast completed on 10-29-24 showing scattered left intrarenal calculi with a 1.0 cm nonobstructive calculus at left lower kidney pole.  No hydronephrosis.   - Patient remains asymptomatic.  -He did not complete KUB prior to today's visit, will complete this now. We will notify patient of results.        Screening for prostate cancer  -Denies family hx of  malignancy.  -PSA 2.308 (10/29/24).  -Repeat PSA ordered to be completed in 6 months prior to follow-up.  Orders:    PSA, Total Screen; Future    -All questions addressed. Please do not hesitate to reach out with further questions or concerns.     History of Present Illness   Andrea Quintero is a 70 y.o. male who presents for follow-up of history of nephrolithiasis.  Patient was evaluated by Dr. Kimbrough in 2025.  He previously had a CT abdomen pelvis without contrast completed on 10-29-24 showing scattered left intrarenal calculi with a 1.0 cm nonobstructive calculus at left lower kidney pole.  No hydronephrosis.  He was to repeat a KUB prior to today's follow-up, however did not.      He think he passed a stone last fall.  He denies previous stone interventions.  Denies any current dysuria, flank pain, frequency, urgency, gross hematuria.  Denies recurrent UTIs.    He is on daily tamsulosin for nocturia 5x per night, states this is working well. Nocturia on average 1x per night.     Review of Systems   Constitutional:  Negative for activity change, chills, fatigue and fever.   HENT:  Negative for congestion, rhinorrhea and sore throat.    Eyes:  Negative for photophobia, redness and visual disturbance.   Respiratory:  Negative for cough, shortness of breath and wheezing.    Cardiovascular:   Negative for chest pain, palpitations and leg swelling.   Gastrointestinal:  Negative for abdominal pain, diarrhea, nausea and vomiting.   Genitourinary:  Negative for difficulty urinating, dysuria, flank pain, frequency, hematuria and urgency.        Nocturia   Neurological:  Negative for weakness, light-headedness and headaches.          Objective   /80 (BP Location: Left arm, Patient Position: Sitting, Cuff Size: Standard)   Pulse (!) 107   Wt 98.9 kg (218 lb)   SpO2 97%   BMI 29.57 kg/m²     Physical Exam  Vitals and nursing note reviewed.   Constitutional:       General: He is not in acute distress.     Appearance: He is well-developed.   HENT:      Head: Normocephalic and atraumatic.   Eyes:      Conjunctiva/sclera: Conjunctivae normal.   Pulmonary:      Effort: Pulmonary effort is normal. No respiratory distress.   Musculoskeletal:         General: No swelling.      Cervical back: Neck supple.   Skin:     General: Skin is warm and dry.      Capillary Refill: Capillary refill takes less than 2 seconds.   Neurological:      Mental Status: He is alert.   Psychiatric:         Mood and Affect: Mood normal.          Results   Lab Results   Component Value Date    PSA 2.308 10/29/2024    PSA 0.87 06/15/2023    PSA 0.8 01/27/2022     Lab Results   Component Value Date    CALCIUM 9.4 10/29/2024    K 3.7 10/29/2024    CO2 30 10/29/2024     10/29/2024    BUN 16 10/29/2024    CREATININE 1.16 10/29/2024     Lab Results   Component Value Date    WBC 5.28 02/19/2025    HGB 15.6 02/19/2025    HCT 46.5 02/19/2025    MCV 93 02/19/2025     02/19/2025       Office Urine Dip  No results found for this or any previous visit (from the past hour).

## 2025-04-22 ENCOUNTER — HOSPITAL ENCOUNTER (OUTPATIENT)
Dept: RADIOLOGY | Facility: HOSPITAL | Age: 70
Discharge: HOME | End: 2025-04-22
Payer: MEDICARE

## 2025-04-22 DIAGNOSIS — N20.0 NEPHROLITHIASIS: ICD-10-CM

## 2025-04-22 PROCEDURE — 74018 RADEX ABDOMEN 1 VIEW: CPT

## 2025-04-28 ENCOUNTER — RESULTS FOLLOW-UP (OUTPATIENT)
Dept: UROLOGY | Facility: CLINIC | Age: 70
End: 2025-04-28

## 2025-05-16 ENCOUNTER — TELEPHONE (OUTPATIENT)
Dept: SURGICAL ONCOLOGY | Facility: CLINIC | Age: 70
End: 2025-05-16

## 2025-05-16 NOTE — TELEPHONE ENCOUNTER
Called patient and spoke to Andrea, we were able to get his office visit with Dr. Magallanes rescheduled as she will be [place] that day. I confirmed the new date, time and location with the patient.

## 2025-06-11 ENCOUNTER — HOSPITAL ENCOUNTER (OUTPATIENT)
Dept: ULTRASOUND IMAGING | Facility: HOSPITAL | Age: 70
Discharge: HOME | End: 2025-06-11
Attending: STUDENT IN AN ORGANIZED HEALTH CARE EDUCATION/TRAINING PROGRAM
Payer: MEDICARE

## 2025-06-11 DIAGNOSIS — Z85.820 ENCOUNTER FOR FOLLOW-UP SURVEILLANCE OF MELANOMA: ICD-10-CM

## 2025-06-11 DIAGNOSIS — Z08 ENCOUNTER FOR FOLLOW-UP SURVEILLANCE OF MELANOMA: ICD-10-CM

## 2025-06-11 PROCEDURE — 76536 US EXAM OF HEAD AND NECK: CPT

## 2025-07-15 ENCOUNTER — OFFICE VISIT (OUTPATIENT)
Dept: SURGICAL ONCOLOGY | Facility: CLINIC | Age: 70
End: 2025-07-15
Payer: MEDICARE

## 2025-07-15 VITALS
BODY MASS INDEX: 27.72 KG/M2 | SYSTOLIC BLOOD PRESSURE: 132 MMHG | HEART RATE: 88 BPM | TEMPERATURE: 97.6 F | HEIGHT: 74 IN | OXYGEN SATURATION: 99 % | DIASTOLIC BLOOD PRESSURE: 80 MMHG | WEIGHT: 216 LBS

## 2025-07-15 DIAGNOSIS — E04.1 THYROID NODULE: Primary | ICD-10-CM

## 2025-07-15 DIAGNOSIS — C43.59 MALIGNANT MELANOMA OF TORSO EXCLUDING BREAST (HCC): ICD-10-CM

## 2025-07-15 PROCEDURE — 99214 OFFICE O/P EST MOD 30 MIN: CPT | Performed by: STUDENT IN AN ORGANIZED HEALTH CARE EDUCATION/TRAINING PROGRAM

## 2025-07-22 ENCOUNTER — HOSPITAL ENCOUNTER (OUTPATIENT)
Dept: ULTRASOUND IMAGING | Facility: HOSPITAL | Age: 70
Discharge: HOME | End: 2025-07-22
Attending: STUDENT IN AN ORGANIZED HEALTH CARE EDUCATION/TRAINING PROGRAM
Payer: MEDICARE

## 2025-07-22 DIAGNOSIS — E04.1 THYROID NODULE: ICD-10-CM

## 2025-07-22 PROCEDURE — 76536 US EXAM OF HEAD AND NECK: CPT

## 2025-07-30 ENCOUNTER — TELEPHONE (OUTPATIENT)
Dept: SURGICAL ONCOLOGY | Facility: CLINIC | Age: 70
End: 2025-07-30

## 2025-07-30 DIAGNOSIS — E04.1 THYROID NODULE: Primary | ICD-10-CM

## 2025-08-19 ENCOUNTER — APPOINTMENT (OUTPATIENT)
Dept: LAB | Age: 70
End: 2025-08-19
Attending: INTERNAL MEDICINE
Payer: MEDICARE

## 2025-08-19 DIAGNOSIS — R35.0 URINARY FREQUENCY: ICD-10-CM

## 2025-08-19 DIAGNOSIS — C43.59 MALIGNANT MELANOMA OF TORSO EXCLUDING BREAST (HCC): ICD-10-CM

## 2025-08-19 LAB
ALBUMIN SERPL BCG-MCNC: 4.2 G/DL (ref 3.5–5)
ALP SERPL-CCNC: 50 U/L (ref 34–104)
ALT SERPL W P-5'-P-CCNC: 19 U/L (ref 7–52)
ANION GAP SERPL CALCULATED.3IONS-SCNC: 10 MMOL/L (ref 4–13)
AST SERPL W P-5'-P-CCNC: 17 U/L (ref 13–39)
BASOPHILS # BLD AUTO: 0.04 THOUSANDS/ÂΜL (ref 0–0.1)
BASOPHILS NFR BLD AUTO: 1 % (ref 0–1)
BILIRUB SERPL-MCNC: 0.83 MG/DL (ref 0.2–1)
BUN SERPL-MCNC: 17 MG/DL (ref 5–25)
CALCIUM SERPL-MCNC: 9 MG/DL (ref 8.4–10.2)
CHLORIDE SERPL-SCNC: 104 MMOL/L (ref 96–108)
CO2 SERPL-SCNC: 25 MMOL/L (ref 21–32)
CREAT SERPL-MCNC: 1.1 MG/DL (ref 0.6–1.3)
EOSINOPHIL # BLD AUTO: 0.09 THOUSAND/ÂΜL (ref 0–0.61)
EOSINOPHIL NFR BLD AUTO: 2 % (ref 0–6)
ERYTHROCYTE [DISTWIDTH] IN BLOOD BY AUTOMATED COUNT: 12.4 % (ref 11.6–15.1)
GFR SERPL CREATININE-BSD FRML MDRD: 67 ML/MIN/1.73SQ M
GLUCOSE P FAST SERPL-MCNC: 106 MG/DL (ref 65–99)
HCT VFR BLD AUTO: 44.2 % (ref 36.5–49.3)
HGB BLD-MCNC: 14.8 G/DL (ref 12–17)
IMM GRANULOCYTES # BLD AUTO: 0.02 THOUSAND/UL (ref 0–0.2)
IMM GRANULOCYTES NFR BLD AUTO: 0 % (ref 0–2)
LDH SERPL-CCNC: 150 U/L (ref 140–271)
LYMPHOCYTES # BLD AUTO: 1.37 THOUSANDS/ÂΜL (ref 0.6–4.47)
LYMPHOCYTES NFR BLD AUTO: 27 % (ref 14–44)
MCH RBC QN AUTO: 31.6 PG (ref 26.8–34.3)
MCHC RBC AUTO-ENTMCNC: 33.5 G/DL (ref 31.4–37.4)
MCV RBC AUTO: 94 FL (ref 82–98)
MONOCYTES # BLD AUTO: 0.6 THOUSAND/ÂΜL (ref 0.17–1.22)
MONOCYTES NFR BLD AUTO: 12 % (ref 4–12)
NEUTROPHILS # BLD AUTO: 3.04 THOUSANDS/ÂΜL (ref 1.85–7.62)
NEUTS SEG NFR BLD AUTO: 58 % (ref 43–75)
NRBC BLD AUTO-RTO: 0 /100 WBCS
PLATELET # BLD AUTO: 194 THOUSANDS/UL (ref 149–390)
PMV BLD AUTO: 10.6 FL (ref 8.9–12.7)
POTASSIUM SERPL-SCNC: 3.9 MMOL/L (ref 3.5–5.3)
PROT SERPL-MCNC: 6.6 G/DL (ref 6.4–8.4)
RBC # BLD AUTO: 4.68 MILLION/UL (ref 3.88–5.62)
SODIUM SERPL-SCNC: 139 MMOL/L (ref 135–147)
WBC # BLD AUTO: 5.16 THOUSAND/UL (ref 4.31–10.16)

## 2025-08-19 PROCEDURE — 83615 LACTATE (LD) (LDH) ENZYME: CPT

## 2025-08-19 PROCEDURE — 36415 COLL VENOUS BLD VENIPUNCTURE: CPT

## 2025-08-19 PROCEDURE — 80053 COMPREHEN METABOLIC PANEL: CPT

## 2025-08-19 PROCEDURE — 85025 COMPLETE CBC W/AUTO DIFF WBC: CPT

## 2025-08-21 RX ORDER — TAMSULOSIN HYDROCHLORIDE 0.4 MG/1
0.4 CAPSULE ORAL
Qty: 90 CAPSULE | Refills: 1 | Status: SHIPPED | OUTPATIENT
Start: 2025-08-21

## (undated) DEVICE — SPECIMEN CONTAINER STERILE PEEL PACK

## (undated) DEVICE — TUBING SUCTION 5MM X 12 FT

## (undated) DEVICE — MICRODISSECTION NEEDLE STRAIGHT SLEEVE: Brand: COLORADO

## (undated) DEVICE — SKIN MARKER DUAL TIP WITH RULER CAP, FLEXIBLE RULER AND LABELS: Brand: DEVON

## (undated) DEVICE — SUT SILK 2-0 SH 30 IN K833H

## (undated) DEVICE — NEEDLE 27 G X 1 1/4

## (undated) DEVICE — VIAL DECANTER

## (undated) DEVICE — SURGICEL 2 X 3

## (undated) DEVICE — SYRINGE 10ML LL

## (undated) DEVICE — SUT VICRYL 5-0 P-S 18 IN J493G

## (undated) DEVICE — SUT VICRYL 3-0 REEL 54 IN J285G

## (undated) DEVICE — MEDI-VAC YANK SUCT HNDL W/TPRD BULBOUS TIP: Brand: CARDINAL HEALTH

## (undated) DEVICE — PENCIL ELECTROSURG E-Z CLEAN -0035H

## (undated) DEVICE — BETHLEHEM UNIVERSAL MINOR GEN: Brand: CARDINAL HEALTH

## (undated) DEVICE — IV CATH INTROCAN 18G X 1 1/4 SAFETY

## (undated) DEVICE — SUT MONOCRYL 5-0 P-3 18 IN Y493G

## (undated) DEVICE — DRESSING SILON DUAL-DRESS 50 FOAM 5.5 X 6 IN

## (undated) DEVICE — PLUMEPEN PRO 10FT

## (undated) DEVICE — GLOVE SRG BIOGEL 7

## (undated) DEVICE — SPONGE LAP 18 X 18 IN

## (undated) DEVICE — SUT PROLENE 5-0 P-3 18 IN 8698G

## (undated) DEVICE — CHLORAPREP HI-LITE 26ML ORANGE

## (undated) DEVICE — ADHESIVE SKIN HIGH VISCOSITY EXOFIN 1ML

## (undated) DEVICE — SUT MONOCRYL 4-0 PS-2 27 IN Y426H

## (undated) DEVICE — TIBURON SPLIT SHEET: Brand: CONVERTORS

## (undated) DEVICE — INTENDED FOR TISSUE SEPARATION, AND OTHER PROCEDURES THAT REQUIRE A SHARP SURGICAL BLADE TO PUNCTURE OR CUT.: Brand: BARD-PARKER SAFETY BLADES SIZE 15, STERILE

## (undated) DEVICE — ELECTRODE BLADE MOD E-Z CLEAN 2.5IN 6.4CM -0012M

## (undated) DEVICE — 10FR FRAZIER SUCTION HANDLE: Brand: CARDINAL HEALTH

## (undated) DEVICE — SUT VICRYL 3-0 SH 27 IN J416H

## (undated) DEVICE — SYRINGE BULB 2 OZ

## (undated) DEVICE — SMOKE EVACUATION TUBING WITH 8 IN INTEGRAL WAND AND SPONGE GUARD: Brand: BUFFALO FILTER

## (undated) DEVICE — REM POLYHESIVE ADULT PATIENT RETURN ELECTRODE: Brand: VALLEYLAB

## (undated) DEVICE — SUT PROLENE 4-0 PS-2 18 IN 8682G

## (undated) DEVICE — PAD GROUNDING ADULT

## (undated) DEVICE — INTENDED FOR TISSUE SEPARATION, AND OTHER PROCEDURES THAT REQUIRE A SHARP SURGICAL BLADE TO PUNCTURE OR CUT.: Brand: BARD-PARKER ® CARBON RIB-BACK BLADES

## (undated) DEVICE — HARMONIC FOCUS SHEARS 9CM LENGTH + ADAPTIVE TISSUE TECHNOLOGY FOR USE WITH BLUE HAND PIECE ONLY: Brand: HARMONIC FOCUS

## (undated) DEVICE — SURGICEL SNOW 1 X 2 IN

## (undated) DEVICE — LIGHT HANDLE COVER SLEEVE DISP BLUE STELLAR

## (undated) DEVICE — GLOVE SRG BIOGEL 6.5

## (undated) DEVICE — TONGUE DEPRESSOR STERILE

## (undated) DEVICE — NEEDLE 25G X 1 1/2

## (undated) DEVICE — STRL UNIVERSAL OUTPATIENT PACK: Brand: CARDINAL HEALTH

## (undated) DEVICE — 3M™ STERI-STRIP™ REINFORCED ADHESIVE SKIN CLOSURES, R1547, 1/2 IN X 4 IN (12 MM X 100 MM), 6 STRIPS/ENVELOPE: Brand: 3M™ STERI-STRIP™

## (undated) DEVICE — CONMED ACCESSORY ELECTRODE, FLAT BLADE WITH EXTENDED INSULATION: Brand: CONMED

## (undated) DEVICE — SUT VICRYL 2-0 SH 27 IN UNDYED J417H